# Patient Record
Sex: FEMALE | Race: AMERICAN INDIAN OR ALASKA NATIVE | NOT HISPANIC OR LATINO | Employment: UNEMPLOYED | URBAN - METROPOLITAN AREA
[De-identification: names, ages, dates, MRNs, and addresses within clinical notes are randomized per-mention and may not be internally consistent; named-entity substitution may affect disease eponyms.]

---

## 2017-02-02 ENCOUNTER — TRANSFERRED RECORDS (OUTPATIENT)
Dept: HEALTH INFORMATION MANAGEMENT | Facility: CLINIC | Age: 57
End: 2017-02-02

## 2017-07-29 ENCOUNTER — HEALTH MAINTENANCE LETTER (OUTPATIENT)
Age: 57
End: 2017-07-29

## 2019-08-27 ENCOUNTER — TRANSFERRED RECORDS (OUTPATIENT)
Dept: HEALTH INFORMATION MANAGEMENT | Facility: CLINIC | Age: 59
End: 2019-08-27

## 2019-08-27 LAB — HBA1C MFR BLD: 5.4 % (ref 0–6.6)

## 2019-11-04 ENCOUNTER — HEALTH MAINTENANCE LETTER (OUTPATIENT)
Age: 59
End: 2019-11-04

## 2020-02-16 ENCOUNTER — HEALTH MAINTENANCE LETTER (OUTPATIENT)
Age: 60
End: 2020-02-16

## 2020-11-22 ENCOUNTER — HEALTH MAINTENANCE LETTER (OUTPATIENT)
Age: 60
End: 2020-11-22

## 2020-12-07 LAB — INR (EXTERNAL): 1 (ref 0.9–1.1)

## 2020-12-08 LAB
HPV ABSTRACT: ABNORMAL
PAP-ABSTRACT: NORMAL

## 2020-12-10 LAB
CHOLESTEROL (EXTERNAL): 232 MG/DL
HDLC SERPL-MCNC: 77 MG/DL
HEP C HIM: NORMAL
HIV 1&2 EXT: NORMAL
LDL CHOLESTEROL CALCULATED (EXTERNAL): 138 MG/DL
NON HDL CHOLESTEROL (EXTERNAL): 155 MG/DL
TRIGLYCERIDES (EXTERNAL): 83 MG/DL

## 2021-04-04 ENCOUNTER — HEALTH MAINTENANCE LETTER (OUTPATIENT)
Age: 61
End: 2021-04-04

## 2021-06-03 ENCOUNTER — RECORDS - HEALTHEAST (OUTPATIENT)
Dept: ADMINISTRATIVE | Facility: CLINIC | Age: 61
End: 2021-06-03

## 2021-06-24 LAB — TSH SERPL-ACNC: 2.5 MIU/L (ref 0.3–4.2)

## 2021-09-18 ENCOUNTER — HEALTH MAINTENANCE LETTER (OUTPATIENT)
Age: 61
End: 2021-09-18

## 2021-11-13 ENCOUNTER — HEALTH MAINTENANCE LETTER (OUTPATIENT)
Age: 61
End: 2021-11-13

## 2022-04-30 ENCOUNTER — HEALTH MAINTENANCE LETTER (OUTPATIENT)
Age: 62
End: 2022-04-30

## 2022-10-12 LAB
ALT SERPL-CCNC: 12 IU/L (ref 8–45)
AST SERPL-CCNC: 15 IU/L (ref 2–40)
CREATININE (EXTERNAL): 1.19 MG/DL (ref 0.57–1.11)
GFR ESTIMATED (EXTERNAL): 52 ML/MIN/1.73M2
GLUCOSE (EXTERNAL): 101 MG/DL (ref 65–100)
POTASSIUM (EXTERNAL): 3.9 MMOL/L (ref 3.5–5)

## 2022-11-20 ENCOUNTER — HEALTH MAINTENANCE LETTER (OUTPATIENT)
Age: 62
End: 2022-11-20

## 2023-01-21 ENCOUNTER — APPOINTMENT (OUTPATIENT)
Dept: GENERAL RADIOLOGY | Facility: CLINIC | Age: 63
End: 2023-01-21
Attending: EMERGENCY MEDICINE
Payer: MEDICARE

## 2023-01-21 ENCOUNTER — HOSPITAL ENCOUNTER (EMERGENCY)
Facility: CLINIC | Age: 63
Discharge: HOME | End: 2023-01-21
Attending: EMERGENCY MEDICINE | Admitting: EMERGENCY MEDICINE
Payer: MEDICARE

## 2023-01-21 VITALS
WEIGHT: 115 LBS | HEIGHT: 63 IN | DIASTOLIC BLOOD PRESSURE: 78 MMHG | TEMPERATURE: 98 F | RESPIRATION RATE: 20 BRPM | OXYGEN SATURATION: 98 % | SYSTOLIC BLOOD PRESSURE: 135 MMHG | HEART RATE: 87 BPM | BODY MASS INDEX: 20.38 KG/M2

## 2023-01-21 DIAGNOSIS — M25.531 RIGHT WRIST PAIN: ICD-10-CM

## 2023-01-21 PROCEDURE — 73110 X-RAY EXAM OF WRIST: CPT | Mod: RT

## 2023-01-21 PROCEDURE — 99283 EMERGENCY DEPT VISIT LOW MDM: CPT

## 2023-01-21 ASSESSMENT — ACTIVITIES OF DAILY LIVING (ADL): ADLS_ACUITY_SCORE: 35

## 2023-01-21 NOTE — ED NOTES
Splint applied to her right wrist, educated on how to put it on and take it off. Advised pt to take Ibuprofen and tylenol for pain

## 2023-01-21 NOTE — ED TRIAGE NOTES
Pt presents with R wrist pain- pt reports she fractured the wrist about a year ago and had surgical intervention and now reports she has fallen 3x and having increased pain and difficulty using the wrist/hand      Triage Assessment     Row Name 01/21/23 1219       Triage Assessment (Adult)    Airway WDL WDL       Respiratory WDL    Respiratory WDL WDL       Skin Circulation/Temperature WDL    Skin Circulation/Temperature WDL WDL       Cardiac WDL    Cardiac WDL WDL       Peripheral/Neurovascular WDL    Peripheral Neurovascular WDL WDL       Cognitive/Neuro/Behavioral WDL    Cognitive/Neuro/Behavioral WDL WDL

## 2023-01-21 NOTE — ED PROVIDER NOTES
"  History     Chief Complaint:  Wrist Pain       HPI   Louann Lofton is a 62 year old female with a history of Ischemic cardiomyopathy, hypertension, and hyperlipidemia who presents with right wrist pain. The patient reports that she fractured her wrist about a year ago and had surgical intervention. She states that she has fallen 3x and has been having increased pain and difficulty rotating her wrist. She states for the pain to be \"shooting\" down her forearm when she tries to use her wrist.     Independent Historian: Patient     Review of External Notes: na     ROS:  Review of Systems    Allergies:  Neurontin   Aleve   Nsaids  Paxil   Ultram      Medications:  Elavil  Aspirin 325  Bentyl  Xopenex HFA  Toprol XL  Nitrostat  Omeprazole  Phenergan  Diovan      Past Medical History:    Acute myocardial infarction  Alcohol abuse  Asthma  Blood transfusion  Congenital coronary artery anomaly  CAD  Diaphragmatic hernia  Dyslipidemia  Eczema herpeticum  Esophageal reflux  Irritable bowel syndrome  Ischemic cardiomyopathy  LV thrombus  Depression  Mitral valve disorder  Blastocystis hominis  Hypercholesterolemia  Tobacco abuse  Ventral hernia    Past Surgical History:    HC Esophagoscopy  HC Removal gallbladder  Meniscectomy  Cholecystectomy  Hiatal hernia  (IA) SD Repair achilles tendon  Nissen surgery      Family History:    Father: Atrial fibrillation, Hypertension  Mother: COPD, Breast cancer, Ovarian cancer, Hypertension, Peripheral vascular disease  Daughter(s): Gastrointestinal disease    Social History:  Patient arrived alone via private vehicle to the ED.     Physical Exam     Patient Vitals for the past 24 hrs:   BP Temp Temp src Pulse Resp SpO2 Height Weight   01/21/23 1220 135/78 -- -- -- -- -- -- --   01/21/23 1219 -- 98  F (36.7  C) Temporal 87 20 98 % 1.6 m (5' 3\") 52.2 kg (115 lb)        Physical Exam  GENERAL: well developed, pleasant  HEAD: atraumatic  EYES: pupils reactive, extraocular muscles intact, " conjunctivae normal  ENT:  mucus membranes moist  NECK:  trachea midline, normal range of motion  RESPIRATORY: no tachypnea, breath sounds clear to auscultation   CVS: normal S1/S2, no murmurs, intact distal pulses  ABDOMEN: soft, nontender, nondistention  MUSCULOSKELETAL: no deformities. Generalized pain to the right wrist. No swelling or bruising. No erythema   SKIN: warm and dry, no acute rashes or ulceration  NEURO: GCS 15, cranial nerves intact, alert and oriented x3  PSYCH:  Mood/affect normal    Emergency Department Course     Imaging:  XR Wrist Right G/E 3 Views   Final Result   IMPRESSION: Postop ORIF distal right radius fracture with a volar plate and screw fixation hardware. Chronic healed distal right radius fracture deformity. Hardware intact. Chronic healed deformity distal ulna. No acute right wrist fracture or    dislocation. Mild thumb CMC and STT joint osteoarthritis. Chronic tiny bone fragment radial to the radial styloid. No significant right wrist soft tissue swelling.            Report per radiology    Emergency Department Course & Assessments:     Independent Interpretation (X-rays, CTs, rhythm strip):  Review of xray     Consultations/Discussion of Management or Tests:  ED Course as of 01/21/23 1457   Sat Jan 21, 2023   1235 I obtained history and examined the patient as noted above.     1325 I rechecked the patient and explained findings.         Social Determinants of Health affecting care:  na     Disposition:  The patient was discharged to home.     Impression & Plan    Medical Decision Making:  Patient presents with pain to prior surgical wrist with a few falls.  She has pain at radial aspect all the way to the proximal forearm.  The arm and wrist are normal on visual exam without erythema, warmth and palpation no obvious abnormalities.  Her motor function is normal.  Xray shows prior hardware without acute findings.  She was provided a velcro brace and instructions to follow with her  orthopedic surgeon.  Do not suspect occult fracture, septic joint, gout, pseudogout, arterial or venous occlusion.  Likely arthritis from prior surgery and recent falls and may need PT, splinting, etc.      Diagnosis:    ICD-10-CM    1. Right wrist pain  M25.531              Scribe Disclosure:  I, ATIYA ERICKSON, am serving as a scribe at 12:35 PM on 1/21/2023 to document services personally performed by Jose Ward MD based on my observations and the provider's statements to me.    1/21/2023   Jose Ward MD Adams, Shaun L, MD  01/23/23 3743

## 2023-02-18 ENCOUNTER — TRANSFERRED RECORDS (OUTPATIENT)
Dept: HEALTH INFORMATION MANAGEMENT | Facility: CLINIC | Age: 63
End: 2023-02-18
Payer: MEDICARE

## 2023-03-03 ENCOUNTER — OFFICE VISIT (OUTPATIENT)
Dept: CARDIOLOGY | Facility: CLINIC | Age: 63
End: 2023-03-03
Payer: MEDICARE

## 2023-03-03 VITALS
OXYGEN SATURATION: 97 % | SYSTOLIC BLOOD PRESSURE: 131 MMHG | HEIGHT: 63 IN | DIASTOLIC BLOOD PRESSURE: 77 MMHG | WEIGHT: 118 LBS | HEART RATE: 75 BPM | BODY MASS INDEX: 20.91 KG/M2

## 2023-03-03 DIAGNOSIS — I25.5 ISCHEMIC CARDIOMYOPATHY: ICD-10-CM

## 2023-03-03 DIAGNOSIS — I73.9 CLAUDICATION OF CALF MUSCLES (H): ICD-10-CM

## 2023-03-03 DIAGNOSIS — M79.662 PAIN IN BOTH LOWER LEGS: ICD-10-CM

## 2023-03-03 DIAGNOSIS — R00.2 PALPITATIONS: Primary | ICD-10-CM

## 2023-03-03 DIAGNOSIS — M79.661 PAIN IN BOTH LOWER LEGS: ICD-10-CM

## 2023-03-03 PROCEDURE — 93000 ELECTROCARDIOGRAM COMPLETE: CPT | Performed by: INTERNAL MEDICINE

## 2023-03-03 PROCEDURE — 99204 OFFICE O/P NEW MOD 45 MIN: CPT | Performed by: INTERNAL MEDICINE

## 2023-03-03 RX ORDER — METOPROLOL SUCCINATE 25 MG/1
12.5 TABLET, EXTENDED RELEASE ORAL DAILY
Qty: 15 TABLET | Refills: 11 | Status: SHIPPED | OUTPATIENT
Start: 2023-03-03

## 2023-03-03 RX ORDER — ASPIRIN 81 MG/1
81 TABLET, CHEWABLE ORAL DAILY
Qty: 30 TABLET | Refills: 11 | Status: SHIPPED | OUTPATIENT
Start: 2023-03-03 | End: 2024-01-03

## 2023-03-03 NOTE — LETTER
"3/3/2023    [facility], [place]  [address] [facility] [address]    RE: Louann Lofton       Dear Colleague,     I had the pleasure of seeing Louann Lofton in the [facility].  CARDIOLOGY CONSULT    REASON FOR CONSULT: cardiomyopathy    PRIMARY CARE PHYSICIAN:  [facility]    HISTORY OF PRESENT ILLNESS:  Ms. Mccabe a pleasant 62-year-old female with PMH significant for ischemic cardiomyopathy who presents to cardiology clinic to establish cardiology care.  In brief review, she originally presented in 2006 with chest pain and ST elevation on ECG. Her initial troponin on presentation was normal. Given her concerning clinical presentation, she was nonetheless administered thrombolytics (TNK) at [facility] in [place]. She continued to have chest discomfort and ECG changes and was taken to the cath lab. Initially, there was suspicion of stress-induced cardiomyopathy given the absence of culprit lesion and apical akinesis. Cardiac MRI, however, later confirmed large anterior myocardial infarction and left ventricular thrombus. She was started on warfarin and completed 3 months of therapy. Additionally, she has undergone a Nissen fundoplication in 2009 for reflux disease. She has a history of chronic abdominal pain ever since. She is undergoing an evaluation in GI clinic and also has tried medical marijuana in the past, although this has proved to be cost prohibitive. The patient believes she had \"recurrent heart attack\" in 2012. This was treated at [facility]. She does not recall being taken to the cath lab or receiving stent or other aggressive therapy.     In follow up today, Diane is primarily focused on concerns with migraine headaches.  She is a difficult historian.   She is also concerned about pain in her legs that occurs all of the time, but seems to be worse with activity.   She is not entirely clear on the medications she is taking.        PAST MEDICAL HISTORY:  Past Medical " History:   Diagnosis Date     Abdominal pain, left upper quadrant 10/21/2010     Acute myocardial infarction 4/23-4/26/11    Hospitalized @ [facility]     Alcohol abuse, unspecified 1997    Outpatient treatment at [facility]     Asthma, mild intermittent 10/5/2009     Blood transfusion, without reported diagnosis      Congenital coronary artery anomaly 2/06    Acute coronary syndrome-[place]     Coronary artery disease      Diaphragmatic hernia without mention of obstruction or gangrene 5/2/06     Dyslipidemia      Eczema herpeticum      Esophageal reflux      Folds in Descemet's membrane 10/9/2008     Hyperlipidemia      HYPERLIPIDEMIA NEC/NOS 9/25/2006     Irritable bowel syndrome      Ischemic cardiomyopathy     [place] 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, ejection fraction 48%     LV thrombus 12/1/2006     Major depressive disorder, single episode, severe, without mention of psychotic behavior 2006    major depressive disorder     MI NOS EPISODE CARE UNSPEC 3/6/2006     Mitral valve disorders(424.0) 2/25/06    Hosp - apical ballooning syndrome and acute coronary syndrome     Palpitations      PARASITIC DISEASE NEC, 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     Pure hypercholesterolemia      RECURR DEPR PSYCHOS-UNSP 3/20/2006     SEQUELAE BHAVIN INFRCT,OTHR NEC 11/29/2006     Tobacco abuse      Tobacco use disorder        MEDICATIONS:  Current Outpatient Medications   Medication     amitriptyline (ELAVIL) 10 MG tablet     aspirin 325 MG EC tablet     dicyclomine (BENTYL) 10 MG capsule     levalbuterol (XOPENEX HFA) 45 MCG/ACT inhaler     metoprolol (TOPROL XL) 50 MG 24 hr tablet     nitroglycerin (NITROSTAT) 0.4 MG SL tablet     omeprazole 20 MG tablet     promethazine (PHENERGAN) 25 MG tablet     valsartan (DIOVAN) 160 MG tablet     No current facility-administered medications for this visit.       ALLERGIES:  Allergies   Allergen Reactions     Neurontin [Gabapentin Enacarbil] Other (See Comments)     Pt  was admitted for weakness at [facility] after starting Neurontin.     Aleve [Naproxen Sodium] Hives and Swelling     lips swelled     Nsaids      Noted in 5/18/08 ER     Paxil [Paroxetine]      rash     Ultram [Tramadol Hcl] Swelling       SOCIAL HISTORY:  She continues to smoke.  No current ETOH use.      FAMILY HISTORY:  I have reviewed this patient's family history and updated it with pertinent information if needed.   Family History   Problem Relation Age of Onset     Cancer Mother         ovarian     C.A.D. Mother      Breast Cancer Mother      Respiratory Mother         COPD     Gastrointestinal Disease Daughter         Bleeding ulcers     Gastrointestinal Disease Daughter         Colitis       REVIEW OF SYSTEMS:  A complete ROS was obtained and the pertinent positives are outlined in the history of present illness above.  The remainder of systems is negative.      PHYSICAL EXAM:                     Vital Signs with Ranges     0 lbs 0 oz    Constitutional: awake, alert, no distress  Eyes: PERRL, sclera nonicteric  ENT: trachea midline  Respiratory: CTAB  Cardiovascular: RRR no m/r/g, no JVD  GI: nondistended, nontender, bowel sounds present  Lymph/Hematologic: no lymphadenopathy  Skin: dry, no rash  Musculoskeletal: good muscle tone, no edema bilaterally, +2 DP pulses bilaterally  Neurologic: no focal deficits  Neuropsychiatric: appropriate affact    DATA:    EKG: Dated 3/3/23 reviewed personally.  Sinus rhythm with Q waves in anterior leads; no changes compared with prior ECG    Coronary angiogram in 2007  CONCLUSION:   1.  Smooth 30-40% ostial LAD narrowing with a 30-50% mid-LAD   narrowing.   2. 25-30% mid RCA stenosis.   3. Decreased left ventricular function, estimated ejection fraction   of 50% with severe hypokinesia affecting the distal anterior wall   with the apex being frankly akinetic to dyskinetic.  No thrombus is   seen. No mitral regurgitation is seen.     Echocardiogram dated 1/28/21  from outside hospital  1. Moderately enlarged left ventricular chamber size.   2. Calculated 2-D biplane volumetric left ventricular ejection fraction 42 %.   3. Regional wall motion abnormalities were present (see wall motion graphics).   4. Normal right ventricular function.   5. Estimated right ventricular systolic pressure 38 mmHg.   6. No significant valvular heart disease.   7. Normal inferior vena cava size with normal inspiratory collapse (>50%).   8. Compared to the report of 07/06/2010 the following changes have occurred: LV chamber size   has increased and wall motion abnormalities are more extensive (particularly at the mid   ventricular level). Side by side comparison of images performed.            ASSESSMENT:  1.  Ischemic cardiomyopathy:   Large anterior MI 2/2006.  Class I symotoms and euvolemic at the current weight.  LVEF 40-45% by last echocardiogram  2.  Coronary artery disease:  Later anterior MI in 2006.   No symptoms concerning for recurrent obstructive CAD  3.  Tobacco abuse  4.  Hyperlipidemia  5.  Headaches  6.  Bilateral leg pain;  Atypical for claudication    RECOMMENDATIONS:  1. Challening visit and difficult to redirect towards cardiac issues.  Discussed importance of medical treatment of her CAD and ischemic cardiomyopathy.  Discussed treatment with valsartan (which she believes she is taking) along with BB therapy.  She is agreeable to metoprolol XL 50 mg daily.    2.  Continue ASA.  She reports issues with statins.  3  Will obtain bilateral ABIs with exercise to exclude peripheral arterial disease  4. Will update echocardiogram to reassess LV function  5.  Discussed importance of smoking cessation  6.  Follow up in 6 months or before than as necessary     Rose Fierro MD [facility]  March 3, 2023      Thank you for allowing me to participate in the care of your patient.      Sincerely,     Rose Fierro MD     [facility]  [facility]  cc:   No referring provider defined for this encounter.

## 2023-03-03 NOTE — PATIENT INSTRUCTIONS
-Start aspirin 81 mg daily  -Start metoprolol XL 12.5 mg daily  -Schedule echocardiogram  -Schedule bilateral LOUISE for leg symptoms   -Follow up in 6 months with Dr. Fierro

## 2023-03-03 NOTE — PROGRESS NOTES
"CARDIOLOGY CONSULT    REASON FOR CONSULT: cardiomyopathy    PRIMARY CARE PHYSICIAN:  [facility]    HISTORY OF PRESENT ILLNESS:  Ms. Mccabe a pleasant 62-year-old female with PMH significant for ischemic cardiomyopathy who presents to cardiology clinic to establish cardiology care.  In brief review, she originally presented in 2006 with chest pain and ST elevation on ECG. Her initial troponin on presentation was normal. Given her concerning clinical presentation, she was nonetheless administered thrombolytics (TNK) at [facility] in [place]. She continued to have chest discomfort and ECG changes and was taken to the cath lab. Initially, there was suspicion of stress-induced cardiomyopathy given the absence of culprit lesion and apical akinesis. Cardiac MRI, however, later confirmed large anterior myocardial infarction and left ventricular thrombus. She was started on warfarin and completed 3 months of therapy. Additionally, she has undergone a Nissen fundoplication in 2009 for reflux disease. She has a history of chronic abdominal pain ever since. She is undergoing an evaluation in GI clinic and also has tried medical marijuana in the past, although this has proved to be cost prohibitive. The patient believes she had \"recurrent heart attack\" in 2012. This was treated at [facility]. She does not recall being taken to the cath lab or receiving stent or other aggressive therapy.     In follow up today, Diane is primarily focused on concerns with migraine headaches.  She is a difficult historian.   She is also concerned about pain in her legs that occurs all of the time, but seems to be worse with activity.   She is not entirely clear on the medications she is taking.        PAST MEDICAL HISTORY:  Past Medical History:   Diagnosis Date     Abdominal pain, left upper quadrant 10/21/2010     Acute myocardial infarction 4/23-4/26/11    Hospitalized @ [facility]     Alcohol abuse, unspecified 1997    " Outpatient treatment at [facility]     Asthma, mild intermittent 10/5/2009     Blood transfusion, without reported diagnosis      Congenital coronary artery anomaly 2/06    Acute coronary syndrome-[place]     Coronary artery disease      Diaphragmatic hernia without mention of obstruction or gangrene 5/2/06     Dyslipidemia      Eczema herpeticum      Esophageal reflux      Folds in Descemet's membrane 10/9/2008     Hyperlipidemia      HYPERLIPIDEMIA NEC/NOS 9/25/2006     Irritable bowel syndrome      Ischemic cardiomyopathy     [place] 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, ejection fraction 48%     LV thrombus 12/1/2006     Major depressive disorder, single episode, severe, without mention of psychotic behavior 2006    major depressive disorder     MI NOS EPISODE CARE UNSPEC 3/6/2006     Mitral valve disorders(424.0) 2/25/06    Hosp - apical ballooning syndrome and acute coronary syndrome     Palpitations      PARASITIC DISEASE NEC, 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     Pure hypercholesterolemia      RECURR DEPR PSYCHOS-UNSP 3/20/2006     SEQUELAE BHAVIN INFRCT,OTHR NEC 11/29/2006     Tobacco abuse      Tobacco use disorder        MEDICATIONS:  Current Outpatient Medications   Medication     amitriptyline (ELAVIL) 10 MG tablet     aspirin 325 MG EC tablet     dicyclomine (BENTYL) 10 MG capsule     levalbuterol (XOPENEX HFA) 45 MCG/ACT inhaler     metoprolol (TOPROL XL) 50 MG 24 hr tablet     nitroglycerin (NITROSTAT) 0.4 MG SL tablet     omeprazole 20 MG tablet     promethazine (PHENERGAN) 25 MG tablet     valsartan (DIOVAN) 160 MG tablet     No current facility-administered medications for this visit.       ALLERGIES:  Allergies   Allergen Reactions     Neurontin [Gabapentin Enacarbil] Other (See Comments)     Pt was admitted for weakness at [facility] after starting Neurontin.     Aleve [Naproxen Sodium] Hives and Swelling     lips swelled     Nsaids      Noted in 5/18/08 ER     Paxil [Paroxetine]       rash     Ultram [Tramadol Hcl] Swelling       SOCIAL HISTORY:  She continues to smoke.  No current ETOH use.      FAMILY HISTORY:  I have reviewed this patient's family history and updated it with pertinent information if needed.   Family History   Problem Relation Age of Onset     Cancer Mother         ovarian     C.A.D. Mother      Breast Cancer Mother      Respiratory Mother         COPD     Gastrointestinal Disease Daughter         Bleeding ulcers     Gastrointestinal Disease Daughter         Colitis       REVIEW OF SYSTEMS:  A complete ROS was obtained and the pertinent positives are outlined in the history of present illness above.  The remainder of systems is negative.      PHYSICAL EXAM:                     Vital Signs with Ranges     0 lbs 0 oz    Constitutional: awake, alert, no distress  Eyes: PERRL, sclera nonicteric  ENT: trachea midline  Respiratory: CTAB  Cardiovascular: RRR no m/r/g, no JVD  GI: nondistended, nontender, bowel sounds present  Lymph/Hematologic: no lymphadenopathy  Skin: dry, no rash  Musculoskeletal: good muscle tone, no edema bilaterally, +2 DP pulses bilaterally  Neurologic: no focal deficits  Neuropsychiatric: appropriate affact    DATA:    EKG: Dated 3/3/23 reviewed personally.  Sinus rhythm with Q waves in anterior leads; no changes compared with prior ECG    Coronary angiogram in 2007  CONCLUSION:   1.  Smooth 30-40% ostial LAD narrowing with a 30-50% mid-LAD   narrowing.   2. 25-30% mid RCA stenosis.   3. Decreased left ventricular function, estimated ejection fraction   of 50% with severe hypokinesia affecting the distal anterior wall   with the apex being frankly akinetic to dyskinetic.  No thrombus is   seen. No mitral regurgitation is seen.     Echocardiogram dated 1/28/21 from outside hospital  1. Moderately enlarged left ventricular chamber size.   2. Calculated 2-D biplane volumetric left ventricular ejection fraction 42 %.   3. Regional wall motion abnormalities  were present (see wall motion graphics).   4. Normal right ventricular function.   5. Estimated right ventricular systolic pressure 38 mmHg.   6. No significant valvular heart disease.   7. Normal inferior vena cava size with normal inspiratory collapse (>50%).   8. Compared to the report of 07/06/2010 the following changes have occurred: LV chamber size   has increased and wall motion abnormalities are more extensive (particularly at the mid   ventricular level). Side by side comparison of images performed.            ASSESSMENT:  1.  Ischemic cardiomyopathy:   Large anterior MI 2/2006.  Class I symotoms and euvolemic at the current weight.  LVEF 40-45% by last echocardiogram  2.  Coronary artery disease:  Later anterior MI in 2006.   No symptoms concerning for recurrent obstructive CAD  3.  Tobacco abuse  4.  Hyperlipidemia  5.  Headaches  6.  Bilateral leg pain;  Atypical for claudication    RECOMMENDATIONS:  1. Challening visit and difficult to redirect towards cardiac issues.  Discussed importance of medical treatment of her CAD and ischemic cardiomyopathy.  Discussed treatment with valsartan (which she believes she is taking) along with BB therapy.  She is agreeable to metoprolol XL 50 mg daily.    2.  Continue ASA.  She reports issues with statins.  3  Will obtain bilateral ABIs with exercise to exclude peripheral arterial disease  4. Will update echocardiogram to reassess LV function  5.  Discussed importance of smoking cessation  6.  Follow up in 6 months or before than as necessary     Rose Fierro MD [facility]  March 3, 2023

## 2023-04-17 ENCOUNTER — HOSPITAL ENCOUNTER (EMERGENCY)
Facility: CLINIC | Age: 63
Discharge: HOME | End: 2023-04-17
Payer: MEDICARE

## 2023-04-19 ENCOUNTER — APPOINTMENT (OUTPATIENT)
Dept: CT IMAGING | Facility: CLINIC | Age: 63
End: 2023-04-19
Payer: MEDICARE

## 2023-04-19 ENCOUNTER — HOSPITAL ENCOUNTER (EMERGENCY)
Facility: CLINIC | Age: 63
Discharge: HOME | End: 2023-04-19
Attending: EMERGENCY MEDICINE | Admitting: EMERGENCY MEDICINE
Payer: MEDICARE

## 2023-04-19 ENCOUNTER — APPOINTMENT (OUTPATIENT)
Dept: CARDIOLOGY | Facility: CLINIC | Age: 63
End: 2023-04-19
Payer: MEDICARE

## 2023-04-19 VITALS
SYSTOLIC BLOOD PRESSURE: 115 MMHG | OXYGEN SATURATION: 98 % | HEART RATE: 104 BPM | DIASTOLIC BLOOD PRESSURE: 96 MMHG | TEMPERATURE: 97.4 F

## 2023-04-19 DIAGNOSIS — N30.00 ACUTE CYSTITIS WITHOUT HEMATURIA: ICD-10-CM

## 2023-04-19 DIAGNOSIS — R11.2 NAUSEA AND VOMITING, UNSPECIFIED VOMITING TYPE: ICD-10-CM

## 2023-04-19 LAB
ALBUMIN SERPL BCG-MCNC: 4.1 G/DL (ref 3.5–5.2)
ALBUMIN SERPL BCG-MCNC: 4.9 G/DL (ref 3.5–5.2)
ALBUMIN UR-MCNC: 20 MG/DL
ALP SERPL-CCNC: 60 U/L (ref 35–104)
ALP SERPL-CCNC: 75 U/L (ref 35–104)
ALT SERPL W P-5'-P-CCNC: 16 U/L (ref 10–35)
ALT SERPL W P-5'-P-CCNC: 17 U/L (ref 10–35)
AMMONIA PLAS-SCNC: 22 UMOL/L (ref 11–51)
AMPHETAMINES UR QL SCN: ABNORMAL
ANION GAP SERPL CALCULATED.3IONS-SCNC: 16 MMOL/L (ref 7–15)
ANION GAP SERPL CALCULATED.3IONS-SCNC: 19 MMOL/L (ref 7–15)
APPEARANCE UR: CLEAR
AST SERPL W P-5'-P-CCNC: 28 U/L (ref 10–35)
AST SERPL W P-5'-P-CCNC: 36 U/L (ref 10–35)
ATRIAL RATE - MUSE: 101 BPM
BARBITURATES UR QL SCN: ABNORMAL
BENZODIAZ UR QL SCN: ABNORMAL
BI-PLANE LVEF ECHO: NORMAL
BILIRUB SERPL-MCNC: 0.5 MG/DL
BILIRUB SERPL-MCNC: 0.5 MG/DL
BILIRUB UR QL STRIP: NEGATIVE
BUN SERPL-MCNC: 20.7 MG/DL (ref 8–23)
BUN SERPL-MCNC: 22.3 MG/DL (ref 8–23)
BZE UR QL SCN: ABNORMAL
CALCIUM SERPL-MCNC: 8.4 MG/DL (ref 8.8–10.2)
CALCIUM SERPL-MCNC: 9.5 MG/DL (ref 8.8–10.2)
CANNABINOIDS UR QL SCN: ABNORMAL
CHLORIDE SERPL-SCNC: 104 MMOL/L (ref 98–107)
CHLORIDE SERPL-SCNC: 107 MMOL/L (ref 98–107)
COLOR UR AUTO: ABNORMAL
CREAT SERPL-MCNC: 0.69 MG/DL (ref 0.51–0.95)
CREAT SERPL-MCNC: 0.73 MG/DL (ref 0.51–0.95)
DEPRECATED HCO3 PLAS-SCNC: 17 MMOL/L (ref 22–29)
DEPRECATED HCO3 PLAS-SCNC: 19 MMOL/L (ref 22–29)
DIASTOLIC BLOOD PRESSURE - MUSE: NORMAL MMHG
ERYTHROCYTE [DISTWIDTH] IN BLOOD BY AUTOMATED COUNT: 14.1 % (ref 10–15)
ETHANOL SERPL-MCNC: <0.01 G/DL
FLUAV RNA SPEC QL NAA+PROBE: NEGATIVE
FLUBV RNA RESP QL NAA+PROBE: NEGATIVE
GFR SERPL CREATININE-BSD FRML MDRD: >90 ML/MIN/1.73M2
GFR SERPL CREATININE-BSD FRML MDRD: >90 ML/MIN/1.73M2
GLUCOSE BLDC GLUCOMTR-MCNC: 90 MG/DL (ref 70–99)
GLUCOSE SERPL-MCNC: 117 MG/DL (ref 70–99)
GLUCOSE SERPL-MCNC: 91 MG/DL (ref 70–99)
GLUCOSE UR STRIP-MCNC: NEGATIVE MG/DL
HCT VFR BLD AUTO: 48.5 % (ref 35–47)
HGB BLD-MCNC: 16.1 G/DL (ref 11.7–15.7)
HGB UR QL STRIP: ABNORMAL
HOLD SPECIMEN: NORMAL
HOLD SPECIMEN: NORMAL
INTERPRETATION ECG - MUSE: NORMAL
KETONES UR STRIP-MCNC: 60 MG/DL
LEUKOCYTE ESTERASE UR QL STRIP: NEGATIVE
LIPASE SERPL-CCNC: 12 U/L (ref 13–60)
LVEF ECHO: NORMAL
MAGNESIUM SERPL-MCNC: 1.8 MG/DL (ref 1.7–2.3)
MCH RBC QN AUTO: 30.4 PG (ref 26.5–33)
MCHC RBC AUTO-ENTMCNC: 33.2 G/DL (ref 31.5–36.5)
MCV RBC AUTO: 92 FL (ref 78–100)
MUCOUS THREADS #/AREA URNS LPF: PRESENT /LPF
NITRATE UR QL: POSITIVE
OPIATES UR QL SCN: ABNORMAL
P AXIS - MUSE: 78 DEGREES
PH UR STRIP: 6 [PH] (ref 5–7)
PLATELET # BLD AUTO: 285 10E3/UL (ref 150–450)
POTASSIUM SERPL-SCNC: 3.5 MMOL/L (ref 3.4–5.3)
POTASSIUM SERPL-SCNC: 3.8 MMOL/L (ref 3.4–5.3)
PR INTERVAL - MUSE: 116 MS
PROT SERPL-MCNC: 6.7 G/DL (ref 6.4–8.3)
PROT SERPL-MCNC: 8.1 G/DL (ref 6.4–8.3)
QRS DURATION - MUSE: 84 MS
QT - MUSE: 410 MS
QTC - MUSE: 531 MS
R AXIS - MUSE: 47 DEGREES
RADIOLOGIST FLAGS: ABNORMAL
RBC # BLD AUTO: 5.3 10E6/UL (ref 3.8–5.2)
RBC URINE: 4 /HPF
RSV RNA SPEC NAA+PROBE: NEGATIVE
SARS-COV-2 RNA RESP QL NAA+PROBE: NEGATIVE
SODIUM SERPL-SCNC: 140 MMOL/L (ref 136–145)
SODIUM SERPL-SCNC: 142 MMOL/L (ref 136–145)
SP GR UR STRIP: 1.02 (ref 1–1.03)
SYSTOLIC BLOOD PRESSURE - MUSE: NORMAL MMHG
T AXIS - MUSE: 56 DEGREES
TROPONIN T SERPL HS-MCNC: 11 NG/L
UROBILINOGEN UR STRIP-MCNC: NORMAL MG/DL
VENTRICULAR RATE- MUSE: 101 BPM
WBC # BLD AUTO: 11.2 10E3/UL (ref 4–11)
WBC URINE: 2 /HPF

## 2023-04-19 PROCEDURE — 84484 ASSAY OF TROPONIN QUANT: CPT | Performed by: EMERGENCY MEDICINE

## 2023-04-19 PROCEDURE — 250N000011 HC RX IP 250 OP 636

## 2023-04-19 PROCEDURE — 250N000009 HC RX 250: Performed by: EMERGENCY MEDICINE

## 2023-04-19 PROCEDURE — G1010 CDSM STANSON: HCPCS | Performed by: RADIOLOGY

## 2023-04-19 PROCEDURE — 85014 HEMATOCRIT: CPT

## 2023-04-19 PROCEDURE — C9803 HOPD COVID-19 SPEC COLLECT: HCPCS | Performed by: EMERGENCY MEDICINE

## 2023-04-19 PROCEDURE — 255N000002 HC RX 255 OP 636: Performed by: INTERNAL MEDICINE

## 2023-04-19 PROCEDURE — 82140 ASSAY OF AMMONIA: CPT

## 2023-04-19 PROCEDURE — 250N000011 HC RX IP 250 OP 636: Performed by: EMERGENCY MEDICINE

## 2023-04-19 PROCEDURE — 82077 ASSAY SPEC XCP UR&BREATH IA: CPT

## 2023-04-19 PROCEDURE — 93306 TTE W/DOPPLER COMPLETE: CPT | Mod: 26 | Performed by: INTERNAL MEDICINE

## 2023-04-19 PROCEDURE — 74177 CT ABD & PELVIS W/CONTRAST: CPT | Mod: 26 | Performed by: RADIOLOGY

## 2023-04-19 PROCEDURE — 83690 ASSAY OF LIPASE: CPT

## 2023-04-19 PROCEDURE — 82962 GLUCOSE BLOOD TEST: CPT

## 2023-04-19 PROCEDURE — 93010 ELECTROCARDIOGRAM REPORT: CPT | Performed by: EMERGENCY MEDICINE

## 2023-04-19 PROCEDURE — 83735 ASSAY OF MAGNESIUM: CPT

## 2023-04-19 PROCEDURE — 96361 HYDRATE IV INFUSION ADD-ON: CPT | Performed by: EMERGENCY MEDICINE

## 2023-04-19 PROCEDURE — 87186 SC STD MICRODIL/AGAR DIL: CPT | Performed by: EMERGENCY MEDICINE

## 2023-04-19 PROCEDURE — G1010 CDSM STANSON: HCPCS

## 2023-04-19 PROCEDURE — 80307 DRUG TEST PRSMV CHEM ANLYZR: CPT | Performed by: EMERGENCY MEDICINE

## 2023-04-19 PROCEDURE — 258N000003 HC RX IP 258 OP 636

## 2023-04-19 PROCEDURE — 81001 URINALYSIS AUTO W/SCOPE: CPT | Performed by: EMERGENCY MEDICINE

## 2023-04-19 PROCEDURE — 87637 SARSCOV2&INF A&B&RSV AMP PRB: CPT

## 2023-04-19 PROCEDURE — 99285 EMERGENCY DEPT VISIT HI MDM: CPT | Mod: 25 | Performed by: EMERGENCY MEDICINE

## 2023-04-19 PROCEDURE — G1010 CDSM STANSON: HCPCS | Mod: GC | Performed by: RADIOLOGY

## 2023-04-19 PROCEDURE — 93005 ELECTROCARDIOGRAM TRACING: CPT | Performed by: EMERGENCY MEDICINE

## 2023-04-19 PROCEDURE — 36415 COLL VENOUS BLD VENIPUNCTURE: CPT

## 2023-04-19 PROCEDURE — 96374 THER/PROPH/DIAG INJ IV PUSH: CPT | Mod: 59 | Performed by: EMERGENCY MEDICINE

## 2023-04-19 PROCEDURE — 80053 COMPREHEN METABOLIC PANEL: CPT

## 2023-04-19 PROCEDURE — 999N000208 ECHOCARDIOGRAM COMPLETE

## 2023-04-19 PROCEDURE — 84155 ASSAY OF PROTEIN SERUM: CPT

## 2023-04-19 PROCEDURE — 70450 CT HEAD/BRAIN W/O DYE: CPT | Mod: 26 | Performed by: RADIOLOGY

## 2023-04-19 RX ORDER — CEFPODOXIME PROXETIL 200 MG/1
200 TABLET, FILM COATED ORAL 2 TIMES DAILY
Qty: 14 TABLET | Refills: 0 | Status: SHIPPED | OUTPATIENT
Start: 2023-04-19 | End: 2023-04-19

## 2023-04-19 RX ORDER — LORAZEPAM 2 MG/ML
0.5 INJECTION INTRAMUSCULAR ONCE
Status: COMPLETED | OUTPATIENT
Start: 2023-04-19 | End: 2023-04-19

## 2023-04-19 RX ORDER — CEFPODOXIME PROXETIL 200 MG/1
200 TABLET, FILM COATED ORAL 2 TIMES DAILY
Qty: 14 TABLET | Refills: 0 | Status: ON HOLD | OUTPATIENT
Start: 2023-04-19 | End: 2024-01-11

## 2023-04-19 RX ORDER — IOPAMIDOL 755 MG/ML
70 INJECTION, SOLUTION INTRAVASCULAR ONCE
Status: COMPLETED | OUTPATIENT
Start: 2023-04-19 | End: 2023-04-19

## 2023-04-19 RX ADMIN — LORAZEPAM 0.5 MG: 2 INJECTION INTRAMUSCULAR; INTRAVENOUS at 09:54

## 2023-04-19 RX ADMIN — SODIUM CHLORIDE 1000 ML: 9 INJECTION, SOLUTION INTRAVENOUS at 09:45

## 2023-04-19 RX ADMIN — IOPAMIDOL 70 ML: 755 INJECTION, SOLUTION INTRAVENOUS at 10:30

## 2023-04-19 RX ADMIN — HUMAN ALBUMIN MICROSPHERES AND PERFLUTREN 6 ML: 10; .22 INJECTION, SOLUTION INTRAVENOUS at 15:54

## 2023-04-19 RX ADMIN — SODIUM CHLORIDE, PRESERVATIVE FREE 70 ML: 5 INJECTION INTRAVENOUS at 10:30

## 2023-04-19 ASSESSMENT — ACTIVITIES OF DAILY LIVING (ADL)
ADLS_ACUITY_SCORE: 33
ADLS_ACUITY_SCORE: 35

## 2023-04-19 NOTE — ED NOTES
Pt signed out to me by Dr. Cedillo at 3 pm.       Situation: Patient is a 62-year-old female with a history of multiple chronic medical problems who presented to the ER with nausea vomiting and diarrhea.  Patient was having active vomiting when she presented to the ER.  She ended up having a CT abdomen pelvis that showed concern for a LV thrombus.  Plan was therefore to obtain an echocardiogram and discussed the case with cardiology.  The cardiology fellow had already been consulted regarding the case.    Plan: Plan is to obtain a echocardiogram and discussed the care with cardiology.    Shift Report:    Patient had an echocardiogram done that shows no signs of an LV thrombus.  It appears it is likely trabecula.  I discussed these findings with Dr. Meir Ward as well as the cardiology fellow.  They reviewed patient's medical chart and do not feel that she has any cardiovascular reasons to be admitted.  It appears that patient's had this LV thrombus before and is no longer there.  She has no reason for needing anticoagulation.  I went and spoke to the patient regarding these results.  Patient woke up and was wondering if she be a candidate for hospice.  I however have no reason to think that she would need hospice at this time.  Patient is alert oriented though she has an odd affect.  Patient's UA does show positive nitrates so we will treat her with oral antibiotics for presumed UTI.  This is possibly what caused her nausea and vomiting before.  Plan will be to discharge her home with outpatient follow-up    Signed:  Charlotte Cedillo MD  April 19, 2023 at 7:24 PM       Charlotte Cedillo MD  04/19/23 [id number]

## 2023-04-19 NOTE — ED PROVIDER NOTES
ED Provider Note  [facility]      History     Chief Complaint   Patient presents with     Nausea, Vomiting, & Diarrhea     HPI  Louann Lofton is a 62 year old female with PMH of ischemic cardiomyopathy, migraines, GERD s/p Nissen fundoplication in 2009, asthma, alcohol abuse, tobacco use, depression/anxiety and PTSD who presents for evaluation of nausea, vomiting and diarrhea.     Patient was brought in by ambulance. She was given 8 mg zofran en route.  Patient is a difficult historian.  She reports that yesterday she was feeling unwell and having nausea.  She did drink roughly 4 shots of alcohol at least yesterday during the day with friends.  She reports that this was her first time drinking alcohol in quite some time, she does have history of alcohol abuse.  She says that this morning she woke up and was vomiting.  She has vomited several times.  She thinks that her emesis may have contained some blood, but she is unsure if she ate something that may have been red-colored yesterday.  Patient reports that this morning she also started experiencing diarrhea.  She describes the diarrhea as loose and watery.  Since this morning she has had 2 episodes of diarrhea.  No recent antibiotic use.  Patient says that she has not been eating or drinking much.    She is not having any abdominal pain.  She denies any fevers or chills.  She is not having any pain with urination.  She does endorse some shortness of breath at baseline, she does have a history of asthma and is a current smoker.  She does not endorse any active chest pain.    Patient lives with her son in an apartment.  She reports that she is tired of feeling sick and wishes that she would be able to go on hospice.      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    Acute coronary syndrome-[place]     Coronary artery disease      Diaphragmatic hernia without mention of obstruction or gangrene 5/2/06     Dyslipidemia      Eczema herpeticum      Esophageal reflux      Folds in Descemet's membrane 10/9/2008     Hyperlipidemia      HYPERLIPIDEMIA NEC/NOS 9/25/2006     Irritable bowel syndrome      Ischemic cardiomyopathy     [place] 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, ejection fraction 48%     LV thrombus 12/1/2006     Major depressive disorder, single episode, severe, without mention of psychotic behavior 2006    major depressive disorder     MI NOS EPISODE CARE UNSPEC 3/6/2006     Mitral valve disorders(424.0) 2/25/06    Hosp - apical ballooning syndrome and acute coronary syndrome     Palpitations      PARASITIC DISEASE NEC, 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     Pure hypercholesterolemia      RECURR DEPR PSYCHOS-UNSP 3/20/2006     SEQUELAE BHAVIN INFRCT,OTHR NEC 11/29/2006     Tobacco abuse      Tobacco use disorder      Past Surgical History:   Procedure Laterality Date     HC ESOPHAGOSCOPY, DIAGNOSTIC  5/2/06     HC REMOVAL GALLBLADDER       cefpodoxime (VANTIN) 200 MG tablet  amitriptyline (ELAVIL) 10 MG tablet  aspirin (ASA) 81 MG chewable tablet  aspirin 325 MG EC tablet  dicyclomine (BENTYL) 10 MG capsule  levalbuterol (XOPENEX HFA) 45 MCG/ACT inhaler  metoprolol (TOPROL XL) 50 MG 24 hr tablet  metoprolol succinate ER (TOPROL XL) 25 MG 24 hr tablet  nitroglycerin (NITROSTAT) 0.4 MG SL tablet  omeprazole 20 MG tablet  promethazine (PHENERGAN) 25 MG tablet  valsartan (DIOVAN) 160 MG tablet      Allergies   Allergen Reactions     Neurontin [Gabapentin Enacarbil] Other (See Comments)     Pt was admitted for weakness at [facility] after starting Neurontin.     Aleve  [Naproxen Sodium] Hives and Swelling     lips swelled     Nsaids      Noted in 08 ER     Paxil [Paroxetine]      rash     Ultram [Tramadol Hcl] Swelling     Family History  Family History   Problem Relation Age of Onset     Cancer Mother         ovarian     C.A.D. Mother      Breast Cancer Mother      Respiratory Mother         COPD     Gastrointestinal Disease Daughter         Bleeding ulcers     Gastrointestinal Disease Daughter         Colitis     Social History   Social History     Tobacco Use     Smoking status: Every Day     Packs/day: 0.50     Years: 20.00     Pack years: 10.00     Types: Cigarettes     Last attempt to quit: 2011     Years since quittin.0     Smokeless tobacco: Never     Tobacco comments:     On and off   Substance Use Topics     Alcohol use: Yes     Comment: denies at this time, hx of abuse, none at this time     Drug use: No      Past medical history, past surgical history, medications, allergies, family history, and social history were reviewed with the patient. No additional pertinent items.      A complete review of systems was performed with pertinent positives and negatives noted in the HPI, and all other systems negative.    Physical Exam   BP: (!) 149/79  Pulse: 84  Temp: 97.4  F (36.3  C)  SpO2: 96 %  Physical Exam  Constitutional:       General: She is in acute distress.      Appearance: She is cachectic. She is ill-appearing.      Comments: Actively vomiting in the ED.    HENT:      Head: Normocephalic and atraumatic.      Mouth/Throat:      Mouth: Mucous membranes are dry.   Eyes:      Extraocular Movements: Extraocular movements intact.      Conjunctiva/sclera: Conjunctivae normal.   Cardiovascular:      Rate and Rhythm: Normal rate and regular rhythm.   Pulmonary:      Effort: Pulmonary effort is normal.      Breath sounds: Decreased air movement present. No wheezing.   Abdominal:      General: Bowel sounds are normal. There is no distension.      Palpations:  Abdomen is soft.      Tenderness: There is no abdominal tenderness. There is no guarding.   Musculoskeletal:         General: Normal range of motion.      Cervical back: Normal range of motion and neck supple.      Right lower leg: No edema.      Left lower leg: No edema.   Skin:     General: Skin is warm and dry.   Neurological:      General: No focal deficit present.      Mental Status: She is alert and oriented to person, place, and time.   Psychiatric:         Mood and Affect: Mood is anxious.         ED Course, Procedures, & Data     ED Course as of 04/20/23 1836   Wed Apr 19, 2023   0912 Patient actively vomiting.    0922 CBC concerning for hemoconcentration. Given 1L NS.     1043 Given Ativan for nausea due to concern for prolonged Qtc.    1044 Mg wnl, lipase low and CMP wnl except for anion gap metabolic acidosis.    1332 Patient with altered mental status and quite sleepy in the room. CT head and ammonia ordered.      Results for orders placed or performed during the hospital encounter of 04/19/23   CT Abdomen Pelvis w Contrast     Status: Abnormal   Result Value Ref Range    Radiologist flags Suspected thrombus within heart apex, recommend (AA)     Narrative    EXAMINATION: CT ABDOMEN PELVIS W CONTRAST, 4/19/2023 10:38 AM    INDICATION: Pt with nausea, vomiting and diarrhea. Concern for  infection versus pancreatitis versus SBO.    COMPARISON STUDY: CT 1/10/2011    TECHNIQUE: CT scan of the abdomen and pelvis was performed on  multidetector CT scanner using volumetric acquisition technique and  images were reconstructed in multiple planes with variable thickness  and reviewed on dedicated workstations.     CONTRAST: Iopamidol 70 mL injected IV without oral contrast    CT scan radiation dose is optimized to minimum requisite dose using  automated dose modulation techniques.    FINDINGS:    Lower thorax: No pleural effusion or air way infiltration. Status post  anterior myocardial infarct with subendocardial  fat deposition along  the anteromedial left ventricular wall and with aneurysmal apical  ballooning. There is suspected partial thrombus with areas of  calcification at the left ventricular apex (4/15) .    Liver: No mass. No intrahepatic biliary ductal dilation.  Diffuse  hepatic steatosis    Biliary System: Gallbladder surgically absent with prominent  extrahepatic bile ducts, likely secondary to reservoir effect.     Pancreas: No mass or pancreatic ductal dilation.    Adrenal glands: No mass or nodules    Spleen: Normal.    Kidneys: No suspicious mass, obstructing calculus or hydronephrosis.    Gastrointestinal tract: Nissen fundoplication changes with small  hiatal hernia involving the fundal wrap. Normal appendix. Normal  caliber small bowel.  Colonic diverticulosis with no diverticulitis.   Thickened appearance of the cecum and ascending/proximal transverse  colon without pericolonic fat stranding, likely related to luminal  collapse. Liquid stool throughout the colon.    Mesentery/peritoneum/retroperitoneum: No mass. No free fluid or air.    Lymph nodes: No significant lymphadenopathy.    Vasculature: Severe atherosclerotic calcification of abdominal aorta  with no aneurysmal dilation.  Patent SMA and SMV.    Pelvis: Urinary bladder is normal. Uterus and adnexa within normal  limits.    Osseous structures: No aggressive or acute osseous lesion.      Soft tissues: Multiple fat containing ventral periumbilical and  supraumbilical abdominal wall hernias, largest fascial defect  measuring up to 3.9 cm .      Impression    IMPRESSION:  1. No acute intraabdominal pathology.  2. Liquid stool throughout the colon, compatible with history of  diarrhea.   3. Status post anterior MI with aneurysmal apical ballooning.  Suspected thrombus within the left ventricular apex.  4. Other chronic and incidental findings as detailed in the body of  the report.    [Critical Result: Suspected thrombus within heart apex,  recommend  cardiology consult.]    Finding was identified on 4/19/2023 10:59 AM.     MD Jose Antonio was contacted by Dr. Lutz at 4/19/2023 11:10 AM and  verbalized understanding of the critical finding.     I have personally reviewed the examination and initial interpretation  and I agree with the findings.    ALEXANDRE BURRIS DO         SYSTEM ID:  [id number]   Head CT w/o contrast     Status: None    Narrative    EXAM: CT HEAD W/O CONTRAST  4/19/2023 1:46 PM     HISTORY: AMS       COMPARISON: CT head 10/17/2009    TECHNIQUE: Using multidetector thin collimation helical acquisition  technique, axial, coronal and sagittal CT images from the skull base  to the vertex were obtained without intravenous contrast.   (topogram) image(s) also obtained and reviewed.    FINDINGS:  No acute intracranial hemorrhage, mass effect, or midline shift. No  acute loss of gray-white matter differentiation in the cerebral  hemispheres. Ventricles are proportionate to the cerebral sulci. Clear  basal cisterns.    The bony calvaria and the bones of the skull base are normal. The  visualized portions of the paranasal sinuses and mastoid air cells are  clear. Grossly normal orbits.       Impression    IMPRESSION: No acute intracranial pathology.     I have personally reviewed the examination and initial interpretation  and I agree with the findings.    CHASE KHAN MD         SYSTEM ID:  [id number]   CBC with platelets     Status: Abnormal   Result Value Ref Range    WBC Count 11.2 (H) 4.0 - 11.0 10e3/uL    RBC Count 5.30 (H) 3.80 - 5.20 10e6/uL    Hemoglobin 16.1 (H) 11.7 - 15.7 g/dL    Hematocrit 48.5 (H) 35.0 - 47.0 %    MCV 92 78 - 100 fL    MCH 30.4 26.5 - 33.0 pg    MCHC 33.2 31.5 - 36.5 g/dL    RDW 14.1 10.0 - 15.0 %    Platelet Count 285 150 - 450 10e3/uL   Comprehensive metabolic panel     Status: Abnormal   Result Value Ref Range    Sodium 142 136 - 145 mmol/L    Potassium 3.8 3.4 - 5.3 mmol/L    Chloride 104 98 - 107 mmol/L     Carbon Dioxide (CO2) 19 (L) 22 - 29 mmol/L    Anion Gap 19 (H) 7 - 15 mmol/L    Urea Nitrogen 22.3 8.0 - 23.0 mg/dL    Creatinine 0.69 0.51 - 0.95 mg/dL    Calcium 9.5 8.8 - 10.2 mg/dL    Glucose 117 (H) 70 - 99 mg/dL    Alkaline Phosphatase 75 35 - 104 U/L    AST 36 (H) 10 - 35 U/L    ALT 17 10 - 35 U/L    Protein Total 8.1 6.4 - 8.3 g/dL    Albumin 4.9 3.5 - 5.2 g/dL    Bilirubin Total 0.5 <=1.2 mg/dL    GFR Estimate >90 >60 mL/min/1.73m2   Asymptomatic Influenza A/B, RSV, & SARS-CoV2 PCR (COVID-19) Nasopharyngeal     Status: Normal    Specimen: Nasopharyngeal; Swab   Result Value Ref Range    Influenza A PCR Negative Negative    Influenza B PCR Negative Negative    RSV PCR Negative Negative    SARS CoV2 PCR Negative Negative    Narrative    Testing was performed using the Xpert Xpress CoV2/Flu/RSV Assay on the [employer] Instrument. This test should be ordered for the detection of SARS-CoV-2, influenza, and RSV viruses in individuals who meet clinical and/or epidemiological criteria. Test performance is unknown in asymptomatic patients. This test is for in vitro diagnostic use under the FDA EUA for laboratories certified under CLIA to perform high or moderate complexity testing. This test has not been FDA cleared or approved. A negative result does not rule out the presence of PCR inhibitors in the specimen or target RNA in concentration below the limit of detection for the assay. If only one viral target is positive but coinfection with multiple targets is suspected, the sample should be re-tested with another FDA cleared, approved, or authorized test, if coinfection would change clinical management. This test was validated by the [facility] [employer]. These laboratories are certified under the Clinical Laboratory Improvement Amendments of 1988 (CLIA-88) as qualified to perform high complexity laboratory testing.   Lipase     Status: Abnormal   Result Value Ref Range    Lipase 12 (L) 13 - 60 U/L    Magnesium     Status: Normal   Result Value Ref Range    Magnesium 1.8 1.7 - 2.3 mg/dL   [place] Draw     Status: None    Narrative    The following orders were created for panel order [place] [facility].  Procedure                               Abnormality         Status                     ---------                               -----------         ------                     Extra Blue Top Tube[[id number]]                              Final result               Extra Red Top Tube[[id number]]                               Final result                 Please view results for these tests on the individual orders.   Extra Blue Top Tube     Status: None   Result Value Ref Range    Hold Specimen JIC    Extra Red Top Tube     Status: None   Result Value Ref Range    Hold Specimen JIC    Troponin T, High Sensitivity     Status: Normal   Result Value Ref Range    Troponin T, High Sensitivity 11 <=14 ng/L   UA with Microscopic reflex to Culture     Status: Abnormal    Specimen: Urine, Midstream   Result Value Ref Range    Color Urine Light Yellow Colorless, Straw, Light Yellow, Yellow    Appearance Urine Clear Clear    Glucose Urine Negative Negative mg/dL    Bilirubin Urine Negative Negative    Ketones Urine 60 (A) Negative mg/dL    Specific Gravity Urine 1.020 1.003 - 1.035    Blood Urine Small (A) Negative    pH Urine 6.0 5.0 - 7.0    Protein Albumin Urine 20 (A) Negative mg/dL    Urobilinogen Urine Normal Normal, 2.0 mg/dL    Nitrite Urine Positive (A) Negative    Leukocyte Esterase Urine Negative Negative    Mucus Urine Present (A) None Seen /LPF    RBC Urine 4 (H) <=2 /HPF    WBC Urine 2 <=5 /HPF    Narrative    Urine Culture ordered based on laboratory criteria   Alcohol ethyl     Status: Normal   Result Value Ref Range    Alcohol ethyl <0.01 <=0.01 g/dL   Glucose by meter     Status: Normal   Result Value Ref Range    GLUCOSE BY METER POCT 90 70 - 99 mg/dL   Ammonia (on ice)     Status: Normal   Result Value Ref Range     Ammonia 22 11 - 51 umol/L   Comprehensive metabolic panel     Status: Abnormal   Result Value Ref Range    Sodium 140 136 - 145 mmol/L    Potassium 3.5 3.4 - 5.3 mmol/L    Chloride 107 98 - 107 mmol/L    Carbon Dioxide (CO2) 17 (L) 22 - 29 mmol/L    Anion Gap 16 (H) 7 - 15 mmol/L    Urea Nitrogen 20.7 8.0 - 23.0 mg/dL    Creatinine 0.73 0.51 - 0.95 mg/dL    Calcium 8.4 (L) 8.8 - 10.2 mg/dL    Glucose 91 70 - 99 mg/dL    Alkaline Phosphatase 60 35 - 104 U/L    AST 28 10 - 35 U/L    ALT 16 10 - 35 U/L    Protein Total 6.7 6.4 - 8.3 g/dL    Albumin 4.1 3.5 - 5.2 g/dL    Bilirubin Total 0.5 <=1.2 mg/dL    GFR Estimate >90 >60 mL/min/1.73m2   Drug abuse screen 1 urine (ED)     Status: Abnormal   Result Value Ref Range    Amphetamines Urine Screen Positive (A) Screen Negative    Barbituates Urine Screen Negative Screen Negative    Benzodiazepine Urine Screen Negative Screen Negative    Cannabinoids Urine Screen Positive (A) Screen Negative    Cocaine Urine Screen Negative Screen Negative    Opiates Urine Screen Negative Screen Negative   EKG 12-lead, complete     Status: None   Result Value Ref Range    Systolic Blood Pressure  mmHg    Diastolic Blood Pressure  mmHg    Ventricular Rate 101 BPM    Atrial Rate 101 BPM    HI Interval 116 ms    QRS Duration 84 ms     ms    QTc 531 ms    P Axis 78 degrees    R AXIS 47 degrees    T Axis 56 degrees    Interpretation ECG       Sinus tachycardia  Biatrial enlargement  Anteroseptal infarct , age undetermined  Prolonged QT  Abnormal ECG  Unconfirmed report - interpretation of this ECG is computer generated - see medical record for final interpretation  Confirmed by - EMERGENCY ROOM, PHYSICIAN ([id number]),  ZACH MARTI ([id number]) on 4/19/2023 10:11:11 AM     Echo Complete     Status: None   Result Value Ref Range    Biplane LVEF 41%     LVEF  40-45% (mildly reduced)     [facility]    [id number]  [id number]  [id number]  [id number]^CJ^MIRIAM^BARBIE     [facility]  [facility]  Echocardiography Laboratory  [address] [place] [id number]     Name: GUSLHAN POSADA  MRN: [id number]  : 1960  Study Date: 2023 03:12 PM  Age: 62 yrs  Gender: Female  Patient Location: [facility]  Reason For Study: Cardiac Thrombus seen on CT A/P  Ordering Physician: MIRIAM CORONA  Performed By: Krystal Callaway     BSA: 1.5 m2  Height: 63 in  Weight: 118 lb  ______________________________________________________________________________  Procedure  Complete Portable Echo Adult. Contrast Optison. Optison (NDC #8698-1035-08)  given intravenously. Patient was given 6 ml mixture of 3 ml Optison and 6 ml  saline. 3 ml wasted.  ______________________________________________________________________________  Interpretation Summary  Ischemic CM. Left ventricular function is decreased. The ejection fraction is  40-45% (mildly reduced).  Apical aneurysm without any thrombus. Reported finding on CT likely represents  a trabeculation.  Global right ventricular function is normal.  No significant valvular abnormalities present.  IVC diameter <2.1 cm collapsing >50% with sniff suggests a normal RA pressure  of 3 mmHg.  No pericardial effusion is present.  ______________________________________________________________________________  Left Ventricle  Left ventricular wall thickness is normal. Biplane LVEF is 41%. Severe left  ventricular dilation is present. Left ventricular function is decreased. The  ejection fraction is 40-45% (mildly reduced). Left ventricular diastolic  function is not assessable. Apical wall akinesis is present. There is no  thrombus seen in the left ventricle.     Right Ventricle  The right ventricle is normal size. Global right ventricular function is  normal.     Atria  The right atria appears normal. Severe left atrial enlargement is present.     Mitral Valve  Mild mitral annular calcification is present. Trace mitral insufficiency is  present.     Aortic Valve  On Doppler  interrogation, there is no significant stenosis or regurgitation.     Tricuspid Valve  The tricuspid valve is normal. The peak velocity of the tricuspid regurgitant  jet is not obtainable.     Pulmonic Valve  Trace pulmonic insufficiency is present.     Vessels  The aorta root is normal. IVC diameter <2.1 cm collapsing >50% with sniff  suggests a normal RA pressure of 3 mmHg.     Pericardium  No pericardial effusion is present.     Miscellaneous  No significant valvular abnormalities present.     Compared to Previous Study  No significant changes noted.  ______________________________________________________________________________  MMode/2D Measurements & Calculations  IVSd: 0.82 cm  LVIDd: 5.0 cm  LVIDs: 4.4 cm  LVPWd: 0.88 cm  FS: 13.1 %  LV mass(C)d: 148.3 grams  LV mass(C)dI: 96.0 grams/m2  Ao root diam: 2.8 cm  asc Aorta Diam: 2.5 cm  LVOT diam: 2.0 cm  LVOT area: 3.3 cm2  LA Volume (BP): 69.7 ml     LA Volume Index (BP): 45.0 ml/m2  RWT: 0.35     Doppler Measurements & Calculations  RV S Omid: 18.3 cm/sec     ______________________________________________________________________________  Report approved by: Karin Chang 04/19/2023 04:17 PM         Urine Culture     Status: Abnormal (Preliminary result)    Specimen: Urine, Midstream   Result Value Ref Range    Culture >100,000 CFU/mL Escherichia coli (A)    Urine Culture     Status: Abnormal (Preliminary result)    Specimen: Urine, Midstream   Result Value Ref Range    Culture >100,000 CFU/mL Escherichia coli (A)    Urine Drugs of Abuse Screen     Status: Abnormal    Narrative    The following orders were created for panel order Urine Drugs of Abuse Screen.  Procedure                               Abnormality         Status                     ---------                               -----------         ------                     Drug abuse screen 1 urin...[[id number]]  Abnormal            Final result                 Please view results for these tests  on the individual orders.     Medications   0.9% sodium chloride BOLUS (0 mLs Intravenous Stopped 4/19/23 1200)   LORazepam (ATIVAN) injection 0.5 mg (0.5 mg Intravenous $Given 4/19/23 0956)   CT saline (70 mLs Intravenous $Given 4/19/23 1030)   iopamidol (ISOVUE-370) solution 70 mL (70 mLs Intravenous $Given 4/19/23 1030)   perflutren diluted 1mL to 2mL with saline (OPTISON) diluted injection 6 mL (6 mLs Intravenous $Given 4/19/23 4954)     Labs Ordered and Resulted from Time of ED Arrival to Time of ED Departure   CBC WITH PLATELETS - Abnormal       Result Value    WBC Count 11.2 (*)     RBC Count 5.30 (*)     Hemoglobin 16.1 (*)     Hematocrit 48.5 (*)     MCV 92      MCH 30.4      MCHC 33.2      RDW 14.1      Platelet Count 285     COMPREHENSIVE METABOLIC PANEL - Abnormal    Sodium 142      Potassium 3.8      Chloride 104      Carbon Dioxide (CO2) 19 (*)     Anion Gap 19 (*)     Urea Nitrogen 22.3      Creatinine 0.69      Calcium 9.5      Glucose 117 (*)     Alkaline Phosphatase 75      AST 36 (*)     ALT 17      Protein Total 8.1      Albumin 4.9      Bilirubin Total 0.5      GFR Estimate >90     LIPASE - Abnormal    Lipase 12 (*)    ROUTINE UA WITH MICROSCOPIC REFLEX TO CULTURE - Abnormal    Color Urine Light Yellow      Appearance Urine Clear      Glucose Urine Negative      Bilirubin Urine Negative      Ketones Urine 60 (*)     Specific Gravity Urine 1.020      Blood Urine Small (*)     pH Urine 6.0      Protein Albumin Urine 20 (*)     Urobilinogen Urine Normal      Nitrite Urine Positive (*)     Leukocyte Esterase Urine Negative      Mucus Urine Present (*)     RBC Urine 4 (*)     WBC Urine 2     COMPREHENSIVE METABOLIC PANEL - Abnormal    Sodium 140      Potassium 3.5      Chloride 107      Carbon Dioxide (CO2) 17 (*)     Anion Gap 16 (*)     Urea Nitrogen 20.7      Creatinine 0.73      Calcium 8.4 (*)     Glucose 91      Alkaline Phosphatase 60      AST 28      ALT 16      Protein Total 6.7      Albumin  4.1      Bilirubin Total 0.5      GFR Estimate >90     DRUG ABUSE SCREEN 1 URINE (ED) - Abnormal    Amphetamines Urine Screen Positive (*)     Barbituates Urine Screen Negative      Benzodiazepine Urine Screen Negative      Cannabinoids Urine Screen Positive (*)     Cocaine Urine Screen Negative      Opiates Urine Screen Negative     INFLUENZA A/B, RSV, & SARS-COV2 PCR - Normal    Influenza A PCR Negative      Influenza B PCR Negative      RSV PCR Negative      SARS CoV2 PCR Negative     MAGNESIUM - Normal    Magnesium 1.8     TROPONIN T, HIGH SENSITIVITY - Normal    Troponin T, High Sensitivity 11     ETHYL ALCOHOL LEVEL - Normal    Alcohol ethyl <0.01     GLUCOSE BY METER - Normal    GLUCOSE BY METER POCT 90     AMMONIA - Normal    Ammonia 22     GLUCOSE MONITOR NURSING POCT     Echo Complete   Final Result      Head CT w/o contrast   Final Result   IMPRESSION: No acute intracranial pathology.       I have personally reviewed the examination and initial interpretation   and I agree with the findings.      CHASE KHAN MD            SYSTEM ID:  [id number]      CT Abdomen Pelvis w Contrast   Final Result   Abnormal   IMPRESSION:   1. No acute intraabdominal pathology.   2. Liquid stool throughout the colon, compatible with history of   diarrhea.    3. Status post anterior MI with aneurysmal apical ballooning.   Suspected thrombus within the left ventricular apex.   4. Other chronic and incidental findings as detailed in the body of   the report.      [Critical Result: Suspected thrombus within heart apex, recommend   cardiology consult.]      Finding was identified on 4/19/2023 10:59 AM.       MD Jose Antonio was contacted by Dr. Lutz at 4/19/2023 11:10 AM and   verbalized understanding of the critical finding.       I have personally reviewed the examination and initial interpretation   and I agree with the findings.      ALEXANDRE BURRIS DO            SYSTEM ID:  [id number]            Assessment & Plan    Louann HOLLY  Rolly is a 62 year old female with PMH of ischemic cardiomyopathy, migraines, GERD s/p Nissen fundoplication in 2009, asthma, alcohol abuse, tobacco use, depression/anxiety and PTSD who presents for evaluation of nausea, vomiting and diarrhea.     On presentation, patient is hemodynamically stable, but is actively vomiting. Patient has a history of alcohol abuse and did drink yesterday for the first time in quite some time. This definitely could be exacerbating patient's nausea, vomiting and diarrhea. Although patient doesn't report abdominal pain and has benign abdominal exam, patient is a difficult historian so there is concern for pancreatitis, SBO or other intra-abdominal process with nausea and vomiting. Also, concern for viral gastroenteritis versus C. diff at this time given diarrhea, but again could be exacerbated given alcohol use.     Patient given 8 mg zofran en route without much improvement in nausea/vomiting. EKG concerning for prolonged QTc so given ativan for nausea/vomiting with noted improvement. Given IVF. CBC appears hemoconcentrated. CMP remarkable for anion gap metabolic acidosis but no electrolyte abnormalities. Lipase is low so unlikely to be pancreatitis. CT A/P without any acute intra-abdominal pathology. Covid, RSV and influenza are negative.     However, CT A/P does show LV thrombus. Per chart review, this LV thrombus is not new. Has been treated with Warfarin for 3 months previously for this. Discussed with cardiology and they would recommend TTE for further evaluation. If LV thrombus is visualized on TTE, then cardiology would likely recommend DOAC.      On re-evaluation, patient is very sleepy and altered. She did receive 0.5 mg IV ativan earlier on, but wouldn't expect that low of a dose to cause this. Glucose is 90. Ammonia is wnl. Alcohol level is negative. UDS and CT head pending.  Did also add on urine drug screen which is pending at this time.    Head CT without acute pathology.   Alcohol level negative.    Patient was signed out to Dr. Cedillo who will follow-up on patient's echo, urine drug screen and UA.  On reassessment patient was made more awake and alert, her sleepy and altered this could be secondary to the Ativan.  Patient is awaiting echo, then formal cardiology recommendations.  Dispo pending at this time.    If patient's workup is negative, then patient can be discharged to home.      I have reviewed the nursing notes. I have reviewed the findings, diagnosis, plan and need for follow up with the patient.    Discharge Medication List as of 4/19/2023  7:31 PM          Final diagnoses:   Nausea and vomiting, unspecified vomiting type   Acute cystitis without hematuria     Shena Canales MD   Internal Medicine, PGY1  [facility] EMERGENCY DEPARTMENT  4/19/2023      --    ED Attending Physician Attestation    I Humphrey Zaidi MD, cared for this patient with the Resident. I have performed a history and physical examination of the patient and discussed management with the resident. I reviewed the resident's documentation above and agree with the documented findings and plan of care.    Summary of HPI, PE, ED Course   Patient is a 62 year old female evaluated in the emergency department for nausea vomiting diarrhea x1 day duration. Exam and ED course notable for normal laboratory studies, CT scan with ventricular thrombus but no other acute findings in the abdomen/pelvis.  Patient initially presented with acute emesis appeared mentally appropriate but then later appeared somewhat altered though this was after getting Ativan for nausea vomiting that persisted after Zofran.  Patient had a head CT that was unremarkable, alcohol level negative, ammonia normal.  After evaluation patient did appear less altered, nausea vomiting was controlled.  Discussed case with cardiology, who recommended an echo.  This is pending.  She will need echo cards consult and follow-up on UA disposition  pending at this time.    Critical Care & Procedures  Not applicable.        Medical Decision Making  The patient's presentation was of moderate complexity (an undiagnosed new problem with uncertain diagnosis).    The patient's evaluation involved:  review of external note(s) from 3+ sources (see separate area of note for details)  ordering and/or review of 3+ test(s) in this encounter (see separate area of note for details)  review of 3+ test result(s) ordered prior to this encounter (see separate area of note for details)  independent interpretation of testing performed by another health professional (see separate area of note for details)  discussion of management or test interpretation with another health professional (see separate area of note for details)    The patient's management necessitated further care after sign-out to Dr. patton (see their note for further management).          Humphrey Zaidi MD  Emergency Medicine        Dyan, Humphrey Coreas MD  04/20/23 1001

## 2023-04-19 NOTE — ED TRIAGE NOTES
Pt was brought in by [facility] ambulance for nausea, vomiting and diarrhea. She was given 8 mg zofran by ambulance. She fell asleep after and did not answer questions after that. She is here with her service dog. Pt did not want to come into the triage office to answer questions- would rather stay in the lobby

## 2023-04-19 NOTE — ED TRIAGE NOTES
"    pt says she was drinking alcohol last night with friends. She says she has vomited \"a lot\" and had diarrhea as well. She says nothing hurts right now. She requests water and she gets thirsty and then throws up. RN said we should wait a little before giving water to let the zofran work  "

## 2023-04-20 LAB — BACTERIA UR CULT: ABNORMAL

## 2023-04-20 NOTE — ED NOTES
"Pt given discharge paperwork.  All medications, discharge instructions and follow up appointments discussed.  PIV removed prior.  Pt then became upset. \"so you're not giving me any fucking pain pills, I am just suppsoed to go home and suffer?\"  Pt then took her dog, all belongings and stormed out of ER to cab.  "

## 2023-04-20 NOTE — DISCHARGE INSTRUCTIONS
You have a potential urinary tract infection that caused your symptoms.     Take the antibiotics as prescribed.     Your CT head is normal.     Your echocardiogram (ultrasound of heart) is stable.     Your CT abdomen/pelvis is normal.     Please make an appointment to follow up with Your Primary Care Provider in 3-5 days even if entirely better.

## 2023-04-21 LAB — BACTERIA UR CULT: ABNORMAL

## 2023-07-08 ENCOUNTER — HEALTH MAINTENANCE LETTER (OUTPATIENT)
Age: 63
End: 2023-07-08

## 2023-11-25 ENCOUNTER — HEALTH MAINTENANCE LETTER (OUTPATIENT)
Age: 63
End: 2023-11-25

## 2024-01-02 ENCOUNTER — APPOINTMENT (OUTPATIENT)
Dept: GENERAL RADIOLOGY | Facility: CLINIC | Age: 64
DRG: 065 | End: 2024-01-02
Attending: STUDENT IN AN ORGANIZED HEALTH CARE EDUCATION/TRAINING PROGRAM
Payer: MEDICARE

## 2024-01-02 ENCOUNTER — HOSPITAL ENCOUNTER (INPATIENT)
Facility: CLINIC | Age: 64
LOS: 9 days | Discharge: NURSING, REHAB OR LONG-TERM CARE | DRG: 065 | End: 2024-01-12
Attending: STUDENT IN AN ORGANIZED HEALTH CARE EDUCATION/TRAINING PROGRAM | Admitting: STUDENT IN AN ORGANIZED HEALTH CARE EDUCATION/TRAINING PROGRAM
Payer: MEDICARE

## 2024-01-02 DIAGNOSIS — G89.29 OTHER CHRONIC PAIN: ICD-10-CM

## 2024-01-02 DIAGNOSIS — N39.0 ACUTE UTI: ICD-10-CM

## 2024-01-02 DIAGNOSIS — I63.9 ACUTE CVA (CEREBROVASCULAR ACCIDENT) (H): Primary | ICD-10-CM

## 2024-01-02 DIAGNOSIS — S52.602A CLOSED FRACTURE OF DISTAL END OF LEFT ULNA, UNSPECIFIED FRACTURE MORPHOLOGY, INITIAL ENCOUNTER: ICD-10-CM

## 2024-01-02 DIAGNOSIS — I25.5 ISCHEMIC CARDIOMYOPATHY: ICD-10-CM

## 2024-01-02 DIAGNOSIS — E78.5 HYPERLIPIDEMIA, UNSPECIFIED HYPERLIPIDEMIA TYPE: ICD-10-CM

## 2024-01-02 DIAGNOSIS — F17.200 TOBACCO USE DISORDER: ICD-10-CM

## 2024-01-02 DIAGNOSIS — S52.202A CLOSED FRACTURE OF SHAFT OF LEFT ULNA, UNSPECIFIED FRACTURE MORPHOLOGY, INITIAL ENCOUNTER: ICD-10-CM

## 2024-01-02 DIAGNOSIS — R20.8 HYPERALGESIA: ICD-10-CM

## 2024-01-02 DIAGNOSIS — R55 SYNCOPE AND COLLAPSE: ICD-10-CM

## 2024-01-02 DIAGNOSIS — F41.9 ANXIETY: ICD-10-CM

## 2024-01-02 PROCEDURE — 29125 APPL SHORT ARM SPLINT STATIC: CPT | Mod: LT | Performed by: STUDENT IN AN ORGANIZED HEALTH CARE EDUCATION/TRAINING PROGRAM

## 2024-01-02 PROCEDURE — 73030 X-RAY EXAM OF SHOULDER: CPT | Mod: 26 | Performed by: RADIOLOGY

## 2024-01-02 PROCEDURE — 99285 EMERGENCY DEPT VISIT HI MDM: CPT | Mod: 25 | Performed by: STUDENT IN AN ORGANIZED HEALTH CARE EDUCATION/TRAINING PROGRAM

## 2024-01-02 PROCEDURE — 73110 X-RAY EXAM OF WRIST: CPT | Mod: LT

## 2024-01-02 PROCEDURE — 81001 URINALYSIS AUTO W/SCOPE: CPT | Performed by: STUDENT IN AN ORGANIZED HEALTH CARE EDUCATION/TRAINING PROGRAM

## 2024-01-02 PROCEDURE — 87086 URINE CULTURE/COLONY COUNT: CPT | Performed by: STUDENT IN AN ORGANIZED HEALTH CARE EDUCATION/TRAINING PROGRAM

## 2024-01-02 PROCEDURE — 83605 ASSAY OF LACTIC ACID: CPT | Performed by: STUDENT IN AN ORGANIZED HEALTH CARE EDUCATION/TRAINING PROGRAM

## 2024-01-02 PROCEDURE — 29125 APPL SHORT ARM SPLINT STATIC: CPT | Performed by: STUDENT IN AN ORGANIZED HEALTH CARE EDUCATION/TRAINING PROGRAM

## 2024-01-02 PROCEDURE — 80307 DRUG TEST PRSMV CHEM ANLYZR: CPT | Performed by: STUDENT IN AN ORGANIZED HEALTH CARE EDUCATION/TRAINING PROGRAM

## 2024-01-02 PROCEDURE — 73110 X-RAY EXAM OF WRIST: CPT | Mod: 26 | Performed by: RADIOLOGY

## 2024-01-02 PROCEDURE — 85004 AUTOMATED DIFF WBC COUNT: CPT | Performed by: STUDENT IN AN ORGANIZED HEALTH CARE EDUCATION/TRAINING PROGRAM

## 2024-01-02 PROCEDURE — 93005 ELECTROCARDIOGRAM TRACING: CPT | Performed by: STUDENT IN AN ORGANIZED HEALTH CARE EDUCATION/TRAINING PROGRAM

## 2024-01-02 PROCEDURE — 83036 HEMOGLOBIN GLYCOSYLATED A1C: CPT

## 2024-01-02 PROCEDURE — 96365 THER/PROPH/DIAG IV INF INIT: CPT | Performed by: STUDENT IN AN ORGANIZED HEALTH CARE EDUCATION/TRAINING PROGRAM

## 2024-01-02 PROCEDURE — 84484 ASSAY OF TROPONIN QUANT: CPT | Performed by: STUDENT IN AN ORGANIZED HEALTH CARE EDUCATION/TRAINING PROGRAM

## 2024-01-02 PROCEDURE — 93010 ELECTROCARDIOGRAM REPORT: CPT | Mod: 59 | Performed by: STUDENT IN AN ORGANIZED HEALTH CARE EDUCATION/TRAINING PROGRAM

## 2024-01-02 PROCEDURE — 80053 COMPREHEN METABOLIC PANEL: CPT | Performed by: STUDENT IN AN ORGANIZED HEALTH CARE EDUCATION/TRAINING PROGRAM

## 2024-01-02 PROCEDURE — 84484 ASSAY OF TROPONIN QUANT: CPT

## 2024-01-02 PROCEDURE — 85610 PROTHROMBIN TIME: CPT | Performed by: STUDENT IN AN ORGANIZED HEALTH CARE EDUCATION/TRAINING PROGRAM

## 2024-01-02 PROCEDURE — 85730 THROMBOPLASTIN TIME PARTIAL: CPT | Performed by: STUDENT IN AN ORGANIZED HEALTH CARE EDUCATION/TRAINING PROGRAM

## 2024-01-02 PROCEDURE — 82077 ASSAY SPEC XCP UR&BREATH IA: CPT | Performed by: STUDENT IN AN ORGANIZED HEALTH CARE EDUCATION/TRAINING PROGRAM

## 2024-01-02 PROCEDURE — 87186 SC STD MICRODIL/AGAR DIL: CPT | Performed by: STUDENT IN AN ORGANIZED HEALTH CARE EDUCATION/TRAINING PROGRAM

## 2024-01-02 PROCEDURE — 84443 ASSAY THYROID STIM HORMONE: CPT | Performed by: STUDENT IN AN ORGANIZED HEALTH CARE EDUCATION/TRAINING PROGRAM

## 2024-01-02 PROCEDURE — 71046 X-RAY EXAM CHEST 2 VIEWS: CPT | Mod: 26 | Performed by: RADIOLOGY

## 2024-01-02 PROCEDURE — 73030 X-RAY EXAM OF SHOULDER: CPT | Mod: LT

## 2024-01-02 PROCEDURE — 71046 X-RAY EXAM CHEST 2 VIEWS: CPT

## 2024-01-02 PROCEDURE — 36415 COLL VENOUS BLD VENIPUNCTURE: CPT | Performed by: STUDENT IN AN ORGANIZED HEALTH CARE EDUCATION/TRAINING PROGRAM

## 2024-01-02 PROCEDURE — 83690 ASSAY OF LIPASE: CPT | Performed by: STUDENT IN AN ORGANIZED HEALTH CARE EDUCATION/TRAINING PROGRAM

## 2024-01-02 PROCEDURE — 82550 ASSAY OF CK (CPK): CPT | Performed by: STUDENT IN AN ORGANIZED HEALTH CARE EDUCATION/TRAINING PROGRAM

## 2024-01-02 PROCEDURE — 96361 HYDRATE IV INFUSION ADD-ON: CPT | Performed by: STUDENT IN AN ORGANIZED HEALTH CARE EDUCATION/TRAINING PROGRAM

## 2024-01-02 PROCEDURE — 83735 ASSAY OF MAGNESIUM: CPT | Performed by: STUDENT IN AN ORGANIZED HEALTH CARE EDUCATION/TRAINING PROGRAM

## 2024-01-02 RX ORDER — ACETAMINOPHEN 500 MG
1000 TABLET ORAL ONCE
Status: COMPLETED | OUTPATIENT
Start: 2024-01-02 | End: 2024-01-03

## 2024-01-03 ENCOUNTER — APPOINTMENT (OUTPATIENT)
Dept: GENERAL RADIOLOGY | Facility: CLINIC | Age: 64
DRG: 065 | End: 2024-01-03
Attending: STUDENT IN AN ORGANIZED HEALTH CARE EDUCATION/TRAINING PROGRAM
Payer: MEDICARE

## 2024-01-03 ENCOUNTER — APPOINTMENT (OUTPATIENT)
Dept: GENERAL RADIOLOGY | Facility: CLINIC | Age: 64
DRG: 065 | End: 2024-01-03
Payer: MEDICARE

## 2024-01-03 ENCOUNTER — APPOINTMENT (OUTPATIENT)
Dept: CARDIOLOGY | Facility: CLINIC | Age: 64
DRG: 065 | End: 2024-01-03
Payer: MEDICARE

## 2024-01-03 ENCOUNTER — APPOINTMENT (OUTPATIENT)
Dept: CT IMAGING | Facility: CLINIC | Age: 64
DRG: 065 | End: 2024-01-03
Attending: STUDENT IN AN ORGANIZED HEALTH CARE EDUCATION/TRAINING PROGRAM
Payer: MEDICARE

## 2024-01-03 ENCOUNTER — APPOINTMENT (OUTPATIENT)
Dept: PHYSICAL THERAPY | Facility: CLINIC | Age: 64
DRG: 065 | End: 2024-01-03
Payer: MEDICARE

## 2024-01-03 ENCOUNTER — APPOINTMENT (OUTPATIENT)
Dept: OCCUPATIONAL THERAPY | Facility: CLINIC | Age: 64
DRG: 065 | End: 2024-01-03
Payer: MEDICARE

## 2024-01-03 ENCOUNTER — APPOINTMENT (OUTPATIENT)
Dept: MRI IMAGING | Facility: CLINIC | Age: 64
DRG: 065 | End: 2024-01-03
Attending: STUDENT IN AN ORGANIZED HEALTH CARE EDUCATION/TRAINING PROGRAM
Payer: MEDICARE

## 2024-01-03 PROBLEM — I63.9 ACUTE CVA (CEREBROVASCULAR ACCIDENT) (H): Status: ACTIVE | Noted: 2024-01-03

## 2024-01-03 LAB
ALBUMIN SERPL BCG-MCNC: 3.6 G/DL (ref 3.5–5.2)
ALBUMIN UR-MCNC: 10 MG/DL
ALP SERPL-CCNC: 69 U/L (ref 40–150)
ALT SERPL W P-5'-P-CCNC: 14 U/L (ref 0–50)
AMPHETAMINES UR QL SCN: ABNORMAL
ANION GAP SERPL CALCULATED.3IONS-SCNC: 9 MMOL/L (ref 7–15)
APPEARANCE UR: ABNORMAL
APTT PPP: 29 SECONDS (ref 22–38)
AST SERPL W P-5'-P-CCNC: 18 U/L (ref 0–45)
ATRIAL RATE - MUSE: 71 BPM
ATRIAL RATE - MUSE: 72 BPM
BACTERIA #/AREA URNS HPF: ABNORMAL /HPF
BARBITURATES UR QL SCN: ABNORMAL
BASOPHILS # BLD AUTO: 0 10E3/UL (ref 0–0.2)
BASOPHILS NFR BLD AUTO: 0 %
BENZODIAZ UR QL SCN: ABNORMAL
BI-PLANE LVEF ECHO: NORMAL
BILIRUB SERPL-MCNC: 0.3 MG/DL
BILIRUB UR QL STRIP: NEGATIVE
BUN SERPL-MCNC: 10.9 MG/DL (ref 8–23)
BZE UR QL SCN: ABNORMAL
CALCIUM SERPL-MCNC: 8.7 MG/DL (ref 8.8–10.2)
CANNABINOIDS UR QL SCN: ABNORMAL
CHLORIDE SERPL-SCNC: 107 MMOL/L (ref 98–107)
CHOLEST SERPL-MCNC: 171 MG/DL
CK SERPL-CCNC: 23 U/L (ref 26–192)
COLOR UR AUTO: YELLOW
CREAT SERPL-MCNC: 0.65 MG/DL (ref 0.51–0.95)
CRP SERPL-MCNC: 40.2 MG/L
DEPRECATED HCO3 PLAS-SCNC: 27 MMOL/L (ref 22–29)
DIASTOLIC BLOOD PRESSURE - MUSE: NORMAL MMHG
DIASTOLIC BLOOD PRESSURE - MUSE: NORMAL MMHG
EGFRCR SERPLBLD CKD-EPI 2021: >90 ML/MIN/1.73M2
EOSINOPHIL # BLD AUTO: 0 10E3/UL (ref 0–0.7)
EOSINOPHIL NFR BLD AUTO: 0 %
ERYTHROCYTE [DISTWIDTH] IN BLOOD BY AUTOMATED COUNT: 12.1 % (ref 10–15)
ERYTHROCYTE [DISTWIDTH] IN BLOOD BY AUTOMATED COUNT: 12.4 % (ref 10–15)
ERYTHROCYTE [SEDIMENTATION RATE] IN BLOOD BY WESTERGREN METHOD: 23 MM/HR (ref 0–30)
ETHANOL SERPL-MCNC: <0.01 G/DL
FENTANYL UR QL: ABNORMAL
GLUCOSE BLDC GLUCOMTR-MCNC: 103 MG/DL (ref 70–99)
GLUCOSE BLDC GLUCOMTR-MCNC: 123 MG/DL (ref 70–99)
GLUCOSE BLDC GLUCOMTR-MCNC: 271 MG/DL (ref 70–99)
GLUCOSE SERPL-MCNC: 105 MG/DL (ref 70–99)
GLUCOSE UR STRIP-MCNC: NEGATIVE MG/DL
HBA1C MFR BLD: 5.9 %
HCT VFR BLD AUTO: 39.4 % (ref 35–47)
HCT VFR BLD AUTO: 42.2 % (ref 35–47)
HDLC SERPL-MCNC: 46 MG/DL
HGB BLD-MCNC: 13.7 G/DL (ref 11.7–15.7)
HGB BLD-MCNC: 14.7 G/DL (ref 11.7–15.7)
HGB UR QL STRIP: ABNORMAL
HOLD SPECIMEN: NORMAL
HOLD SPECIMEN: NORMAL
IMM GRANULOCYTES # BLD: 0 10E3/UL
IMM GRANULOCYTES NFR BLD: 0 %
INR PPP: 0.99 (ref 0.85–1.15)
INTERPRETATION ECG - MUSE: NORMAL
INTERPRETATION ECG - MUSE: NORMAL
KETONES UR STRIP-MCNC: NEGATIVE MG/DL
LACTATE SERPL-SCNC: 0.9 MMOL/L (ref 0.7–2)
LDLC SERPL CALC-MCNC: 109 MG/DL
LEUKOCYTE ESTERASE UR QL STRIP: NEGATIVE
LIPASE SERPL-CCNC: 13 U/L (ref 13–60)
LYMPHOCYTES # BLD AUTO: 3.1 10E3/UL (ref 0.8–5.3)
LYMPHOCYTES NFR BLD AUTO: 37 %
MAGNESIUM SERPL-MCNC: 2.1 MG/DL (ref 1.7–2.3)
MCH RBC QN AUTO: 31.1 PG (ref 26.5–33)
MCH RBC QN AUTO: 31.2 PG (ref 26.5–33)
MCHC RBC AUTO-ENTMCNC: 34.8 G/DL (ref 31.5–36.5)
MCHC RBC AUTO-ENTMCNC: 34.8 G/DL (ref 31.5–36.5)
MCV RBC AUTO: 89 FL (ref 78–100)
MCV RBC AUTO: 90 FL (ref 78–100)
MONOCYTES # BLD AUTO: 0.6 10E3/UL (ref 0–1.3)
MONOCYTES NFR BLD AUTO: 8 %
MUCOUS THREADS #/AREA URNS LPF: PRESENT /LPF
NEUTROPHILS # BLD AUTO: 4.7 10E3/UL (ref 1.6–8.3)
NEUTROPHILS NFR BLD AUTO: 55 %
NITRATE UR QL: POSITIVE
NONHDLC SERPL-MCNC: 125 MG/DL
NRBC # BLD AUTO: 0 10E3/UL
NRBC BLD AUTO-RTO: 0 /100
OPIATES UR QL SCN: ABNORMAL
P AXIS - MUSE: 63 DEGREES
P AXIS - MUSE: 82 DEGREES
PCP QUAL URINE (ROCHE): ABNORMAL
PH UR STRIP: 5.5 [PH] (ref 5–7)
PLATELET # BLD AUTO: 289 10E3/UL (ref 150–450)
PLATELET # BLD AUTO: 311 10E3/UL (ref 150–450)
POTASSIUM SERPL-SCNC: 3.3 MMOL/L (ref 3.4–5.3)
PR INTERVAL - MUSE: 106 MS
PR INTERVAL - MUSE: 88 MS
PROT SERPL-MCNC: 6.4 G/DL (ref 6.4–8.3)
QRS DURATION - MUSE: 74 MS
QRS DURATION - MUSE: 78 MS
QT - MUSE: 398 MS
QT - MUSE: 404 MS
QTC - MUSE: 432 MS
QTC - MUSE: 442 MS
R AXIS - MUSE: 29 DEGREES
R AXIS - MUSE: 4 DEGREES
RBC # BLD AUTO: 4.39 10E6/UL (ref 3.8–5.2)
RBC # BLD AUTO: 4.72 10E6/UL (ref 3.8–5.2)
RBC URINE: 1 /HPF
SODIUM SERPL-SCNC: 143 MMOL/L (ref 135–145)
SP GR UR STRIP: 1.02 (ref 1–1.03)
SYSTOLIC BLOOD PRESSURE - MUSE: NORMAL MMHG
SYSTOLIC BLOOD PRESSURE - MUSE: NORMAL MMHG
T AXIS - MUSE: -29 DEGREES
T AXIS - MUSE: 95 DEGREES
TRIGL SERPL-MCNC: 80 MG/DL
TROPONIN T SERPL HS-MCNC: 13 NG/L
TROPONIN T SERPL HS-MCNC: 14 NG/L
TROPONIN T SERPL HS-MCNC: 15 NG/L
TSH SERPL DL<=0.005 MIU/L-ACNC: 2.99 UIU/ML (ref 0.3–4.2)
UFH PPP CHRO-ACNC: <0.1 IU/ML
UROBILINOGEN UR STRIP-MCNC: NORMAL MG/DL
VENTRICULAR RATE- MUSE: 71 BPM
VENTRICULAR RATE- MUSE: 72 BPM
WBC # BLD AUTO: 6.4 10E3/UL (ref 4–11)
WBC # BLD AUTO: 8.5 10E3/UL (ref 4–11)
WBC URINE: 5 /HPF

## 2024-01-03 PROCEDURE — 80061 LIPID PANEL: CPT

## 2024-01-03 PROCEDURE — A9585 GADOBUTROL INJECTION: HCPCS | Performed by: EMERGENCY MEDICINE

## 2024-01-03 PROCEDURE — 999N000065 XR FOREARM LEFT 2 VIEWS

## 2024-01-03 PROCEDURE — 250N000011 HC RX IP 250 OP 636: Performed by: STUDENT IN AN ORGANIZED HEALTH CARE EDUCATION/TRAINING PROGRAM

## 2024-01-03 PROCEDURE — 73060 X-RAY EXAM OF HUMERUS: CPT | Mod: LT

## 2024-01-03 PROCEDURE — 0PSLXZZ REPOSITION LEFT ULNA, EXTERNAL APPROACH: ICD-10-PCS | Performed by: STUDENT IN AN ORGANIZED HEALTH CARE EDUCATION/TRAINING PROGRAM

## 2024-01-03 PROCEDURE — 255N000002 HC RX 255 OP 636: Performed by: INTERNAL MEDICINE

## 2024-01-03 PROCEDURE — 85520 HEPARIN ASSAY: CPT

## 2024-01-03 PROCEDURE — 73090 X-RAY EXAM OF FOREARM: CPT | Mod: LT

## 2024-01-03 PROCEDURE — 250N000013 HC RX MED GY IP 250 OP 250 PS 637

## 2024-01-03 PROCEDURE — 85652 RBC SED RATE AUTOMATED: CPT

## 2024-01-03 PROCEDURE — 70553 MRI BRAIN STEM W/O & W/DYE: CPT | Mod: 26 | Performed by: RADIOLOGY

## 2024-01-03 PROCEDURE — 82962 GLUCOSE BLOOD TEST: CPT

## 2024-01-03 PROCEDURE — 73110 X-RAY EXAM OF WRIST: CPT | Mod: 26 | Performed by: RADIOLOGY

## 2024-01-03 PROCEDURE — 36415 COLL VENOUS BLD VENIPUNCTURE: CPT

## 2024-01-03 PROCEDURE — 73130 X-RAY EXAM OF HAND: CPT | Mod: 26 | Performed by: RADIOLOGY

## 2024-01-03 PROCEDURE — 73060 X-RAY EXAM OF HUMERUS: CPT | Mod: 26 | Performed by: RADIOLOGY

## 2024-01-03 PROCEDURE — 73110 X-RAY EXAM OF WRIST: CPT | Mod: LT

## 2024-01-03 PROCEDURE — 99222 1ST HOSP IP/OBS MODERATE 55: CPT | Mod: GC | Performed by: STUDENT IN AN ORGANIZED HEALTH CARE EDUCATION/TRAINING PROGRAM

## 2024-01-03 PROCEDURE — 73090 X-RAY EXAM OF FOREARM: CPT | Mod: 26 | Performed by: RADIOLOGY

## 2024-01-03 PROCEDURE — 70553 MRI BRAIN STEM W/O & W/DYE: CPT | Mod: MA

## 2024-01-03 PROCEDURE — 84484 ASSAY OF TROPONIN QUANT: CPT | Performed by: STUDENT IN AN ORGANIZED HEALTH CARE EDUCATION/TRAINING PROGRAM

## 2024-01-03 PROCEDURE — 255N000002 HC RX 255 OP 636: Performed by: EMERGENCY MEDICINE

## 2024-01-03 PROCEDURE — 70498 CT ANGIOGRAPHY NECK: CPT | Mod: 26 | Performed by: RADIOLOGY

## 2024-01-03 PROCEDURE — 87040 BLOOD CULTURE FOR BACTERIA: CPT

## 2024-01-03 PROCEDURE — 250N000011 HC RX IP 250 OP 636

## 2024-01-03 PROCEDURE — 97116 GAIT TRAINING THERAPY: CPT | Mod: GP

## 2024-01-03 PROCEDURE — 250N000013 HC RX MED GY IP 250 OP 250 PS 637: Performed by: STUDENT IN AN ORGANIZED HEALTH CARE EDUCATION/TRAINING PROGRAM

## 2024-01-03 PROCEDURE — 97165 OT EVAL LOW COMPLEX 30 MIN: CPT | Mod: GO

## 2024-01-03 PROCEDURE — 86140 C-REACTIVE PROTEIN: CPT

## 2024-01-03 PROCEDURE — 120N000002 HC R&B MED SURG/OB UMMC

## 2024-01-03 PROCEDURE — 97530 THERAPEUTIC ACTIVITIES: CPT | Mod: GP

## 2024-01-03 PROCEDURE — 70496 CT ANGIOGRAPHY HEAD: CPT | Mod: MA

## 2024-01-03 PROCEDURE — 999N000065 XR WRIST PORT LEFT G/E 3 VIEWS: Mod: LT

## 2024-01-03 PROCEDURE — 258N000003 HC RX IP 258 OP 636: Performed by: STUDENT IN AN ORGANIZED HEALTH CARE EDUCATION/TRAINING PROGRAM

## 2024-01-03 PROCEDURE — 97535 SELF CARE MNGMENT TRAINING: CPT | Mod: GO

## 2024-01-03 PROCEDURE — 999N000065 XR HAND PORT LEFT G/E 3 VIEWS: Mod: LT

## 2024-01-03 PROCEDURE — 93306 TTE W/DOPPLER COMPLETE: CPT | Mod: 26 | Performed by: INTERNAL MEDICINE

## 2024-01-03 PROCEDURE — 36415 COLL VENOUS BLD VENIPUNCTURE: CPT | Performed by: STUDENT IN AN ORGANIZED HEALTH CARE EDUCATION/TRAINING PROGRAM

## 2024-01-03 PROCEDURE — 250N000012 HC RX MED GY IP 250 OP 636 PS 637

## 2024-01-03 PROCEDURE — 999N000226 HC STATISTIC SLP IP EVAL DEFER

## 2024-01-03 PROCEDURE — 70450 CT HEAD/BRAIN W/O DYE: CPT | Mod: MA

## 2024-01-03 PROCEDURE — 99204 OFFICE O/P NEW MOD 45 MIN: CPT | Mod: GC | Performed by: INTERNAL MEDICINE

## 2024-01-03 PROCEDURE — 85041 AUTOMATED RBC COUNT: CPT

## 2024-01-03 PROCEDURE — 70496 CT ANGIOGRAPHY HEAD: CPT | Mod: 26 | Performed by: RADIOLOGY

## 2024-01-03 PROCEDURE — 97161 PT EVAL LOW COMPLEX 20 MIN: CPT | Mod: GP

## 2024-01-03 RX ORDER — PROCHLORPERAZINE 25 MG
25 SUPPOSITORY, RECTAL RECTAL EVERY 12 HOURS PRN
Status: DISCONTINUED | OUTPATIENT
Start: 2024-01-03 | End: 2024-01-11

## 2024-01-03 RX ORDER — HEPARIN SODIUM 5000 [USP'U]/.5ML
5000 INJECTION, SOLUTION INTRAVENOUS; SUBCUTANEOUS EVERY 8 HOURS
Status: DISCONTINUED | OUTPATIENT
Start: 2024-01-03 | End: 2024-01-03

## 2024-01-03 RX ORDER — ACETAMINOPHEN 500 MG
500 TABLET ORAL EVERY 6 HOURS PRN
COMMUNITY

## 2024-01-03 RX ORDER — NICOTINE 21 MG/24HR
1 PATCH, TRANSDERMAL 24 HOURS TRANSDERMAL DAILY
Status: DISCONTINUED | OUTPATIENT
Start: 2024-01-03 | End: 2024-01-06

## 2024-01-03 RX ORDER — ONDANSETRON 4 MG/1
4 TABLET, ORALLY DISINTEGRATING ORAL EVERY 6 HOURS PRN
Status: DISCONTINUED | OUTPATIENT
Start: 2024-01-03 | End: 2024-01-12 | Stop reason: HOSPADM

## 2024-01-03 RX ORDER — CEFTRIAXONE 1 G/1
1 INJECTION, POWDER, FOR SOLUTION INTRAMUSCULAR; INTRAVENOUS EVERY 24 HOURS
Qty: 20 ML | Refills: 0 | Status: DISCONTINUED | OUTPATIENT
Start: 2024-01-04 | End: 2024-01-03

## 2024-01-03 RX ORDER — DEXTROSE MONOHYDRATE 25 G/50ML
25-50 INJECTION, SOLUTION INTRAVENOUS
Status: DISCONTINUED | OUTPATIENT
Start: 2024-01-03 | End: 2024-01-06

## 2024-01-03 RX ORDER — ACETAMINOPHEN 325 MG/1
650 TABLET ORAL EVERY 4 HOURS PRN
Status: DISCONTINUED | OUTPATIENT
Start: 2024-01-03 | End: 2024-01-12 | Stop reason: HOSPADM

## 2024-01-03 RX ORDER — ONDANSETRON 2 MG/ML
4 INJECTION INTRAMUSCULAR; INTRAVENOUS EVERY 6 HOURS PRN
Status: DISCONTINUED | OUTPATIENT
Start: 2024-01-03 | End: 2024-01-12 | Stop reason: HOSPADM

## 2024-01-03 RX ORDER — HYDRALAZINE HYDROCHLORIDE 20 MG/ML
10-20 INJECTION INTRAMUSCULAR; INTRAVENOUS
Status: DISCONTINUED | OUTPATIENT
Start: 2024-01-03 | End: 2024-01-12 | Stop reason: HOSPADM

## 2024-01-03 RX ORDER — NICOTINE POLACRILEX 4 MG
15-30 LOZENGE BUCCAL
Status: DISCONTINUED | OUTPATIENT
Start: 2024-01-03 | End: 2024-01-06

## 2024-01-03 RX ORDER — GADOBUTROL 604.72 MG/ML
4.5 INJECTION INTRAVENOUS ONCE
Status: COMPLETED | OUTPATIENT
Start: 2024-01-03 | End: 2024-01-03

## 2024-01-03 RX ORDER — LABETALOL HYDROCHLORIDE 5 MG/ML
10-20 INJECTION, SOLUTION INTRAVENOUS EVERY 10 MIN PRN
Status: DISCONTINUED | OUTPATIENT
Start: 2024-01-03 | End: 2024-01-12 | Stop reason: HOSPADM

## 2024-01-03 RX ORDER — IOPAMIDOL 755 MG/ML
67 INJECTION, SOLUTION INTRAVASCULAR ONCE
Status: COMPLETED | OUTPATIENT
Start: 2024-01-03 | End: 2024-01-03

## 2024-01-03 RX ORDER — HEPARIN SODIUM 10000 [USP'U]/100ML
0-5000 INJECTION, SOLUTION INTRAVENOUS CONTINUOUS
Status: DISPENSED | OUTPATIENT
Start: 2024-01-03 | End: 2024-01-05

## 2024-01-03 RX ORDER — VALSARTAN 160 MG/1
160 TABLET ORAL DAILY
Status: DISCONTINUED | OUTPATIENT
Start: 2024-01-03 | End: 2024-01-03

## 2024-01-03 RX ORDER — CEFTRIAXONE 1 G/1
1 INJECTION, POWDER, FOR SOLUTION INTRAMUSCULAR; INTRAVENOUS ONCE
Status: COMPLETED | OUTPATIENT
Start: 2024-01-03 | End: 2024-01-03

## 2024-01-03 RX ORDER — ASPIRIN 81 MG/1
81 TABLET, CHEWABLE ORAL DAILY
Status: DISCONTINUED | OUTPATIENT
Start: 2024-01-03 | End: 2024-01-12 | Stop reason: HOSPADM

## 2024-01-03 RX ORDER — PROCHLORPERAZINE MALEATE 10 MG
10 TABLET ORAL EVERY 6 HOURS PRN
Status: DISCONTINUED | OUTPATIENT
Start: 2024-01-03 | End: 2024-01-11

## 2024-01-03 RX ORDER — CEFTRIAXONE 1 G/1
1 INJECTION, POWDER, FOR SOLUTION INTRAMUSCULAR; INTRAVENOUS EVERY 24 HOURS
Status: COMPLETED | OUTPATIENT
Start: 2024-01-04 | End: 2024-01-05

## 2024-01-03 RX ORDER — QUETIAPINE FUMARATE 50 MG/1
50 TABLET, FILM COATED ORAL AT BEDTIME
COMMUNITY
Start: 2023-11-17

## 2024-01-03 RX ORDER — LIDOCAINE 40 MG/G
CREAM TOPICAL
Status: DISCONTINUED | OUTPATIENT
Start: 2024-01-03 | End: 2024-01-12 | Stop reason: HOSPADM

## 2024-01-03 RX ADMIN — ACETAMINOPHEN 650 MG: 325 TABLET, FILM COATED ORAL at 20:26

## 2024-01-03 RX ADMIN — GADOBUTROL 4.5 ML: 604.72 INJECTION INTRAVENOUS at 03:33

## 2024-01-03 RX ADMIN — NICOTINE 1 PATCH: 14 PATCH, EXTENDED RELEASE TRANSDERMAL at 08:23

## 2024-01-03 RX ADMIN — SODIUM CHLORIDE, POTASSIUM CHLORIDE, SODIUM LACTATE AND CALCIUM CHLORIDE 1000 ML: 600; 310; 30; 20 INJECTION, SOLUTION INTRAVENOUS at 00:03

## 2024-01-03 RX ADMIN — ACETAMINOPHEN 1000 MG: 500 TABLET ORAL at 00:01

## 2024-01-03 RX ADMIN — ASPIRIN 81 MG CHEWABLE TABLET 81 MG: 81 TABLET CHEWABLE at 08:23

## 2024-01-03 RX ADMIN — HEPARIN SODIUM 5000 UNITS: 5000 INJECTION, SOLUTION INTRAVENOUS; SUBCUTANEOUS at 05:55

## 2024-01-03 RX ADMIN — PERFLUTREN 6 ML: 6.52 INJECTION, SUSPENSION INTRAVENOUS at 09:35

## 2024-01-03 RX ADMIN — HEPARIN SODIUM AND DEXTROSE 800 UNITS/HR: 10000; 5 INJECTION INTRAVENOUS at 14:00

## 2024-01-03 RX ADMIN — INSULIN ASPART 3 UNITS: 100 INJECTION, SOLUTION INTRAVENOUS; SUBCUTANEOUS at 14:09

## 2024-01-03 RX ADMIN — CEFTRIAXONE SODIUM 1 G: 1 INJECTION, POWDER, FOR SOLUTION INTRAMUSCULAR; INTRAVENOUS at 02:10

## 2024-01-03 RX ADMIN — IOPAMIDOL 67 ML: 755 INJECTION, SOLUTION INTRAVENOUS at 01:18

## 2024-01-03 ASSESSMENT — ACTIVITIES OF DAILY LIVING (ADL)
ADLS_ACUITY_SCORE: 35
ADLS_ACUITY_SCORE: 43
ADLS_ACUITY_SCORE: 35
DEPENDENT_IADLS:: TRANSPORTATION
ADLS_ACUITY_SCORE: 42
ADLS_ACUITY_SCORE: 35
ADLS_ACUITY_SCORE: 45
ADLS_ACUITY_SCORE: 43
ADLS_ACUITY_SCORE: 35
ADLS_ACUITY_SCORE: 42
ADLS_ACUITY_SCORE: 35
ADLS_ACUITY_SCORE: 42
ADLS_ACUITY_SCORE: 45

## 2024-01-03 NOTE — MEDICATION SCRIBE - ADMISSION MEDICATION HISTORY
Medication Scribe Admission Medication History    Admission medication history is complete. The information provided in this note is only as accurate as the sources available at the time of the update.    Information Source(s): Patient and CareEverywhere/SureScripts via in-person    Pertinent Information: Patient seemed not sure of most of her medications and could not understand most of what she said. She stated to have accidentally taken Metoprolol recently mistaking it for one of her other meds.     Changes made to PTA medication list:  Added: acetaminophen 500 mg, Quetiapine 50 mg, tizanidine 4 mg.  Deleted: Patient not taking, amitryptiline 10 mg, aspirin 81 mg, aspirin 325 mg, dicyclomine 10 mg, levalbuterol inhaler, metoprolol 50 mg, nitroglycerin 0.4 mg SL, promethazine 25 mg, valsartan 160 mg.  Changed: None    Medication Affordability:  Not including over the counter (OTC) medications, was there a time in the past 3 months when you did not take your medications as prescribed because of cost?: No    Allergies reviewed with patient and updates made in EHR: yes    Medication History Completed By: Meri Feliz 1/3/2024 1:10 PM    Prior to Admission medications    Medication Sig Last Dose Taking? Auth Provider Long Term End Date   acetaminophen (TYLENOL) 500 MG tablet Take 500 mg by mouth every 6 hours as needed Past Week at Unknown Yes Reported, Patient     cefpodoxime (VANTIN) 200 MG tablet Take 1 tablet (200 mg) by mouth 2 times daily Unknown at Unknown Yes Charlotte Cedillo MD     metoprolol succinate ER (TOPROL XL) 25 MG 24 hr tablet Take 0.5 tablets (12.5 mg) by mouth daily Unknown at Unknown Yes Rose Fierro MD Yes    omeprazole 20 MG tablet Take 1 tablet (20 mg) by mouth daily Take 30-60 minutes before a meal. Past Month at Unknown Yes Teetee Varghese MD     QUEtiapine (SEROQUEL) 50 MG tablet Take 50 mg by mouth at bedtime Past Month at Unknown Yes Reported, Patient     tiZANidine  (ZANAFLEX) 4 MG tablet TAKE 1 TABLET BY MOUTH EVERY 8 TO 12 HOURS AS NEEDED AND/OR AT BEDTIME. OR AS DIRECTED UP TO 24MG/DAY Past Month at Unknown Yes Reported, Patient

## 2024-01-03 NOTE — ED NOTES
Bed: ED10  Expected date:   Expected time:   Means of arrival:   Comments:  [facility]   63F  Fall  Been having Left sided weakness  VSS

## 2024-01-03 NOTE — ED TRIAGE NOTES
"Pt biba from ex husbands from home where pt has been staying. Pt has fall onto left side today. C/o pain in L wrist/shoulder. Denies loc     Pt has a hx of l sided weakness. Pt states \"I have been having weakness for a week\"     Bg 245   20g right arm          "

## 2024-01-03 NOTE — ED PROVIDER NOTES
[place] EMERGENCY DEPARTMENT ([facility])    1/02/24       ED PROVIDER NOTE    History     Chief Complaint   Patient presents with    Fall     The history is provided by the patient and medical records.     Louann Lofton is a 63 year old female with PMH of ischemic cardiomyopathy, GERD s/p Nissen fundoplication (2009), asthma, alcohol abuse, tobacco use, depression/anxiety and PTSD who presents to the Emergency Department today due to fall on her left side. Patient reports a fall when she walked in the kitchen after waking up.  She is not sure exactly what time this occurred but states it was in the morning.  She states that she has been on the ground for 1 hour before her sister came and helped her get up.  She states she was then moved to a couch on the left there.  She is unclear with why an ambulance was not called earlier but she essentially states that either no one thought to call one or was able to call an ambulance until later in the evening.  She reports pain in her left wrist and shoulder, especially when moving her body. She also reports numbness in her left arm. She did not take any medications for pain. Patient is unsure how she fell. She does not remember whether she hit her head or not. Patient denies nausea, vomiting, neck pain. She denies alcohol use. Patient smoked this morning. When being asked about recreational drug use, patient showed frustration and angry about medical classification of marijuana. Of note, patient reports multiple fall recently.      Patient states that she has been having weakness and paresthesias on her left side for at least 1 week prior to this incident.    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    Acute coronary syndrome-[place]    Coronary artery disease     Diaphragmatic hernia without mention of obstruction or gangrene 5/2/06    Dyslipidemia     Eczema herpeticum     Esophageal reflux     Folds in Descemet's membrane 10/9/2008    Hyperlipidemia     HYPERLIPIDEMIA NEC/NOS 9/25/2006    Irritable bowel syndrome     Ischemic cardiomyopathy     [place] 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, ejection fraction 48%    LV thrombus 12/1/2006    Major depressive disorder, single episode, severe, without mention of psychotic behavior 2006    major depressive disorder    MI NOS EPISODE CARE UNSPEC 3/6/2006    Mitral valve disorders(424.0) 2/25/06    Hosp - apical ballooning syndrome and acute coronary syndrome    Palpitations     PARASITIC DISEASE NEC, 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    Pure hypercholesterolemia     RECURR DEPR PSYCHOS-UNSP 3/20/2006    SEQUELAE BHAVIN INFRCT,OTHR NEC 11/29/2006    Tobacco abuse     Tobacco use disorder      Past Surgical History:   Procedure Laterality Date    HC ESOPHAGOSCOPY, DIAGNOSTIC  5/2/06    HC REMOVAL GALLBLADDER       amitriptyline (ELAVIL) 10 MG tablet  aspirin (ASA) 81 MG chewable tablet  aspirin 325 MG EC tablet  cefpodoxime (VANTIN) 200 MG tablet  dicyclomine (BENTYL) 10 MG capsule  levalbuterol (XOPENEX HFA) 45 MCG/ACT inhaler  metoprolol (TOPROL XL) 50 MG 24 hr tablet  metoprolol succinate ER (TOPROL XL) 25 MG 24 hr tablet  nitroglycerin (NITROSTAT) 0.4 MG SL tablet  omeprazole 20 MG tablet  promethazine (PHENERGAN) 25 MG tablet  valsartan (DIOVAN) 160 MG tablet      Allergies   Allergen Reactions    Neurontin [Gabapentin Enacarbil] Other (See Comments)     Pt was admitted for weakness at [facility] after starting Neurontin.    Aleve [Naproxen Sodium] Hives and Swelling     lips swelled    Nsaids      Noted in 5/18/08 ER    Paxil [Paroxetine]      rash     "Ultram [Tramadol Hcl] Swelling     Family History  Family History   Problem Relation Age of Onset    Cancer Mother         ovarian    C.A.D. Mother     Breast Cancer Mother     Respiratory Mother         COPD    Gastrointestinal Disease Daughter         Bleeding ulcers    Gastrointestinal Disease Daughter         Colitis     Social History   Social History     Tobacco Use    Smoking status: Every Day     Packs/day: 0.50     Years: 20.00     Additional pack years: 0.00     Total pack years: 10.00     Types: Cigarettes     Last attempt to quit: 2011     Years since quittin.7    Smokeless tobacco: Never    Tobacco comments:     On and off   Substance Use Topics    Alcohol use: Yes     Comment: denies at this time, hx of abuse, none at this time    Drug use: No         A medically appropriate review of systems was performed with pertinent positives and negatives noted in the HPI, and all other systems negative.    Physical Exam   BP: 128/71  Pulse: 77  Temp: 98.4  F (36.9  C)  Resp: 18  Height: 160 cm (5' 3\")  Weight: 45.4 kg (100 lb)  SpO2: 98 %  Physical Exam  Vital Signs Reviewed  Gen: Well nourished, well developed, resting comfortably, no acute distress  HEENT: NC/AT, PERRL, EOMI, MMM  Neck: Supple, FROM  CV: Regular Rate  Lungs/Chest: Normal Effort  Abd: Non-distended  MSK/Back: FROM, no visible deformity  Neuro: A&Ox3, GCS 15, CN II-XII unremarkable.  Strongly decreased sensation in the left arm and leg compared to the right arm and leg.  Intact strength bilateral upper extremities 5/5 with no drift.  4/5 strength left lower extremity compared to 5/5 strength right lower extremity.  4/5 strength proximal left lower extremity.  4+ out of 5 strength distal left lower extremity.  Gait assessment deferred.  Skin: Warm, Dry, Intact, no visible lesions      ED Course, Procedures, & Data      Patient arrives to the emergency department with complaints of mechanical fall versus syncope occurring earlier " today.  Patient with several days of neurologic symptoms.  Even if onset of symptoms is taken as the earliest onset of symptoms today she is well outside the 4.5-hour window  CTA/CT head ordered, labs and trauma imaging ordered  Patient's labs reviewed, notable for a slightly elevated troponin with out evidence of acute ischemia  Urinalysis consistent with UTI, ceftriaxone ordered  Received call from [facility], CT imaging concerning for late acute versus subacute infarcts  Stroke neurology consulted, recommending MRI before decision on antiplatelet therapy is made  XR with distal left ulnar fracture, non-displaced - splinted  Patient signed out to Dr. Hughes to follow-up on neuro recs and admit  [facility]    -Fracture    Date/Time: 1/3/2024 7:03 PM    Performed by: Franklin Garcia MD  Authorized by: Franklin Garcia MD    Risks, benefits and alternatives discussed.      INJURY      Injury location:  Forearm    Forearm injury location:  L forearm    Forearm fracture type comment:  Distal ulnar    PRE PROCEDURE ASSESSMENT      Neurological function: normal      Distal perfusion: normal      Range of motion: reduced      Range of motion comment:  2/2 pain    ANESTHESIA (see MAR for exact dosages)      Anesthesia method:  None    PROCEDURE DETAILS:     Manipulation performed: no      Immobilization:  Splint    Splint type: sandwich short arm.    Supplies used:  Ortho-Glass, elastic bandage and cotton padding    POST PROCEDURE ASSESSMENT      Neurological function: normal      Distal perfusion: normal      Range of motion: unchanged        PROCEDURE  Describe Procedure: Patient with non-displaced distal left ulnar fracture. Short arm sandwich splinting applied with stockinette, cotton padding, ortho glass, and ace wrap. Patient tolerated well without complication.  Patient Tolerance:  Patient tolerated the procedure well with no immediate complications               EKG Interpretation:      Interpreted by Franklin Garcia MD  Time reviewed: 2350  Symptoms at time of EKG: Syncope   Rhythm: normal sinus   Rate: Normal  Axis: Normal  Ectopy: none  Conduction: normal  ST Segments/ T Waves: Non-specific ST-T wave changes      Clinical Impression: Normal sinus rhythm with nonspecific changes, no DAJA                       Results for orders placed or performed during the hospital encounter of 01/02/24   XR Chest 2 Views     Status: None    Narrative    EXAM: XR CHEST 2 VIEWS  LOCATION: [facility]  DATE: 1/3/2024    INDICATION: Fall  COMPARISON: 04/22/2011      Impression    IMPRESSION: Negative chest.   XR Shoulder Left G/E 3 Views     Status: None    Narrative    EXAM: XR SHOULDER LEFT G/E 3 VIEWS  LOCATION: [facility]  DATE: 1/3/2024    INDICATION: Fall, pain  COMPARISON: None.      Impression    IMPRESSION: Normal left shoulder joint spaces and alignment. No left shoulder fracture. Presumed old mid left posterior rib fracture. Clinical correlation recommended.   XR Wrist Left G/E 3 Views     Status: None    Narrative    EXAM: XR WRIST LEFT G/E 3 VIEWS  LOCATION: [facility]  DATE: 1/3/2024    INDICATION: Fall, pain  COMPARISON: None.      Impression    IMPRESSION: There is a nondisplaced oblique fracture through the distal left ulna. No dislocation.   Humerus XR,  G/E 2 views, left     Status: None    Narrative    EXAM: XR HUMERUS LEFT G/E 2 VIEWS  LOCATION: [facility]  DATE: 1/3/2024    INDICATION: Fall, Pain  COMPARISON: None.      Impression    IMPRESSION: Left humerus within normal limits. No fracture.   Head CT w/o contrast     Status: None    Narrative    EXAM: CT HEAD W/O CONTRAST  LOCATION: [facility]  DATE: 1/3/2024    INDICATION: Fall,  left-sided weakness, paresthesias for one week.  COMPARISON: CT head dated 04/19/2023.  TECHNIQUE: Routine CT Head without IV contrast. Multiplanar reformats. Dose reduction techniques were used.    FINDINGS:  INTRACRANIAL CONTENTS: No acute hemorrhage or abnormal extra-axial fluid collection. There are new bilateral inferior cerebellar age-indeterminate lacunar-type infarctions. Stable chronic left superior cerebellar lacunar-type infarction. There is new   focal hypodensity involving the right thalamus as well as a prominent region of transcortical hypodensity identified involving the right occipital lobe. No hemorrhagic conversion or associated hemorrhage identified. Otherwise stable brain parenchymal   attenuation noted elsewhere. Normal ventricles and sulci.     VISUALIZED ORBITS/SINUSES/MASTOIDS: No intraorbital abnormality. No paranasal sinus mucosal disease. No middle ear or mastoid effusion.    BONES/SOFT TISSUES: No acute abnormality.      Impression    IMPRESSION:  1.  New transcortical region of hypodensity identified involving the right occipital lobe with focal hypodensity involving the right thalamus. Findings are age-indeterminate, but concerning for a late acute to subacute ischemic injury within the right   PCA territory.   2.  Age-indeterminate bilateral inferior cerebellar lacunar-type infarctions, new since prior exam.  3.  Chronic left cerebellar infarct.  4.  No hemorrhage.          Dr. Castro Leroy discussed results with Dr. Franklin Goins on 01/03/2024 at 1:52 AM.   CTA Head Neck w Contrast     Status: None    Narrative    EXAM: CTA HEAD NECK W CONTRAST  LOCATION: [facility]  DATE: 1/3/2024    INDICATION: Fall, left-sided weakness and paresthesias one week.  COMPARISON: None.  CONTRAST: Iopamidol (ISOVUE 370) solution 67 mL.  TECHNIQUE: Head and neck CT angiogram with IV contrast. Axial helical CT images of the head and neck vessels  obtained during the arterial phase of intravenous contrast administration. Axial 2D reconstructed images and multiplanar 3D MIP reconstructed   images of the head and neck vessels were performed by the technologist. Dose reduction techniques were used. All stenosis measurements made according to NASCET criteria unless otherwise specified.    FINDINGS:     HEAD CTA:  ANTERIOR CIRCULATION: Calcific plaque noted involving the right supraclinoid ICA causing mild to moderate narrowing. Calcific plaque involving the left cavernous and supraclinoid ICA causing mild narrowing. No proximal large vessel occlusion. At the   level of the left A2/A3 anterior cerebral artery, there is a focal lobular outpouching at the junction of branching anterior cerebral artery vessels measuring 2.5 mm in craniocaudal dimensions, 4 mm in AP dimensions and approximately 3 mm in transverse   dimensions (image 190 series 6 and image 45 series 9), consistent with a left anterior cerebral artery aneurysm. No high-flow vascular malformation. Standard [place] of [place] anatomy.    POSTERIOR CIRCULATION: There is mild short segment narrowing of the distal left intradural vertebral artery. There is severe short segment narrowing of the distal right P2 posterior cerebral artery with faint opacification of the distal right P3/P4   segments of the posterior cerebral artery beyond this finding. No definite proximal large vessel occlusion. No aneurysm. No high-flow vascular malformation. Dominant left and smaller right vertebral artery contribute to a normal basilar artery.     DURAL VENOUS SINUSES: Expected enhancement of the major dural venous sinuses.    NECK CTA:  RIGHT CAROTID: Dense calcific plaque involving the proximal right cervical ICA with less than 50% stenosis. No high-grade stenosis. No dissection.    LEFT CAROTID: Scattered plaque within the left carotid system with less than 50% narrowing. No high-grade stenosis. No dissection.    VERTEBRAL  ARTERIES: No focal stenosis or dissection. Balanced vertebral arteries.    AORTIC ARCH: Irregular calcified and noncalcified plaque noted along the aortic arch. Bovine aortic arch anatomy. Mild narrowing involving the proximal and origin of the left subclavian artery. Minimal narrowing identified involving the origin of the   left common carotid artery. No high-grade stenosis involving the origins of the great vessels.    NONVASCULAR STRUCTURES: Right thyroid lobe nodule with extension into the isthmus measuring up to 2 cm.      Impression    IMPRESSION:   HEAD CTA:   1.  No definite proximal large vessel occlusion identified.  2.  Severe short segment stenosis/narrowing of the distal right P2 posterior cerebral artery with faint opacification of the right P3/P4 posterior cerebral arteries beyond this finding, possibly related to a subocclusive thrombus.   3.  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 Follow-up Neuro Interventional Radiology consultation is recommended.  4.  Mild to moderate stenosis of the right cerebral clinoid ICA.  5.  Additional findings, as above.    NECK CTA:  1.  Atheromatous disease, as above. No high-grade stenosis or dissection.  2.  Low-density thyroid nodule measuring up to 2 cm. Recommend non-emergent thyroid function testing and thyroid ultrasound for further assessment.    Dr. Castro Leroy discussed results with Dr. Franklin Goins on 1/3/2024 2:08 AM CST.   INR     Status: Normal   Result Value Ref Range    INR 0.99 0.85 - 1.15   Partial thromboplastin time     Status: Normal   Result Value Ref Range    aPTT 29 22 - 38 Seconds   Comprehensive metabolic panel     Status: Abnormal   Result Value Ref Range    Sodium 143 135 - 145 mmol/L    Potassium 3.3 (L) 3.4 - 5.3 mmol/L    Carbon Dioxide (CO2) 27 22 - 29 mmol/L    Anion Gap 9 7 - 15 mmol/L    Urea Nitrogen 10.9 8.0 - 23.0 mg/dL    Creatinine 0.65 0.51 - 0.95 mg/dL    GFR Estimate >90 >60 mL/min/1.73m2     Calcium 8.7 (L) 8.8 - 10.2 mg/dL    Chloride 107 98 - 107 mmol/L    Glucose 105 (H) 70 - 99 mg/dL    Alkaline Phosphatase 69 40 - 150 U/L    AST 18 0 - 45 U/L    ALT 14 0 - 50 U/L    Protein Total 6.4 6.4 - 8.3 g/dL    Albumin 3.6 3.5 - 5.2 g/dL    Bilirubin Total 0.3 <=1.2 mg/dL   Lipase     Status: Normal   Result Value Ref Range    Lipase 13 13 - 60 U/L   Lactic acid whole blood     Status: Normal   Result Value Ref Range    Lactic Acid 0.9 0.7 - 2.0 mmol/L   Troponin T, High Sensitivity     Status: Abnormal   Result Value Ref Range    Troponin T, High Sensitivity 15 (H) <=14 ng/L   Magnesium     Status: Normal   Result Value Ref Range    Magnesium 2.1 1.7 - 2.3 mg/dL   TSH with free T4 reflex     Status: Normal   Result Value Ref Range    TSH 2.99 0.30 - 4.20 uIU/mL   UA with Microscopic reflex to Culture     Status: Abnormal    Specimen: Urine, Midstream   Result Value Ref Range    Color Urine Yellow Colorless, Straw, Light Yellow, Yellow    Appearance Urine Slightly Cloudy (A) Clear    Glucose Urine Negative Negative mg/dL    Bilirubin Urine Negative Negative    Ketones Urine Negative Negative mg/dL    Specific Gravity Urine 1.021 1.003 - 1.035    Blood Urine Trace (A) Negative    pH Urine 5.5 5.0 - 7.0    Protein Albumin Urine 10 (A) Negative mg/dL    Urobilinogen Urine Normal Normal, 2.0 mg/dL    Nitrite Urine Positive (A) Negative    Leukocyte Esterase Urine Negative Negative    Bacteria Urine Many (A) None Seen /HPF    Mucus Urine Present (A) None Seen /LPF    RBC Urine 1 <=2 /HPF    WBC Urine 5 <=5 /HPF    Narrative    Urine Culture ordered based on laboratory criteria   Ethyl Alcohol Level     Status: Normal   Result Value Ref Range    Alcohol ethyl <0.01 <=0.01 g/dL   CK total     Status: Abnormal   Result Value Ref Range    CK 23 (L) 26 - 192 U/L   Urine Drug Screen Panel     Status: Abnormal   Result Value Ref Range    Amphetamines Urine Screen Positive (A) Screen Negative    Barbituates Urine  Screen Negative Screen Negative    Benzodiazepine Urine Screen Negative Screen Negative    Cannabinoids Urine Screen Positive (A) Screen Negative    Cocaine Urine Screen Negative Screen Negative    Fentanyl Qual Urine Screen Negative Screen Negative    Opiates Urine Screen Negative Screen Negative    PCP Urine Screen Negative Screen Negative   CBC with platelets and differential     Status: None   Result Value Ref Range    WBC Count 8.5 4.0 - 11.0 10e3/uL    RBC Count 4.72 3.80 - 5.20 10e6/uL    Hemoglobin 14.7 11.7 - 15.7 g/dL    Hematocrit 42.2 35.0 - 47.0 %    MCV 89 78 - 100 fL    MCH 31.1 26.5 - 33.0 pg    MCHC 34.8 31.5 - 36.5 g/dL    RDW 12.4 10.0 - 15.0 %    Platelet Count 311 150 - 450 10e3/uL    % Neutrophils 55 %    % Lymphocytes 37 %    % Monocytes 8 %    % Eosinophils 0 %    % Basophils 0 %    % Immature Granulocytes 0 %    NRBCs per 100 WBC 0 <1 /100    Absolute Neutrophils 4.7 1.6 - 8.3 10e3/uL    Absolute Lymphocytes 3.1 0.8 - 5.3 10e3/uL    Absolute Monocytes 0.6 0.0 - 1.3 10e3/uL    Absolute Eosinophils 0.0 0.0 - 0.7 10e3/uL    Absolute Basophils 0.0 0.0 - 0.2 10e3/uL    Absolute Immature Granulocytes 0.0 <=0.4 10e3/uL    Absolute NRBCs 0.0 10e3/uL   Troponin T, High Sensitivity     Status: Normal   Result Value Ref Range    Troponin T, High Sensitivity 13 <=14 ng/L   EKG 12-lead, tracing only     Status: None (Preliminary result)   Result Value Ref Range    Systolic Blood Pressure  mmHg    Diastolic Blood Pressure  mmHg    Ventricular Rate 72 BPM    Atrial Rate 72 BPM    IL Interval 88 ms    QRS Duration 78 ms     ms    QTc 442 ms    P Axis 82 degrees    R AXIS 29 degrees    T Axis -29 degrees    Interpretation ECG       Sinus rhythm with short IL  Right atrial enlargement  Possible Inferior infarct , age undetermined  Anteroseptal infarct , age undetermined  ST & T wave abnormality, consider lateral ischemia  Abnormal ECG     CBC with platelets differential     Status: None    Narrative     The following orders were created for panel order CBC with platelets differential.  Procedure                               Abnormality         Status                     ---------                               -----------         ------                     CBC with platelets and d...[[id number]]                      Final result                 Please view results for these tests on the individual orders.   Urine Drug Screen     Status: Abnormal    Narrative    The following orders were created for panel order Urine Drug Screen.  Procedure                               Abnormality         Status                     ---------                               -----------         ------                     Urine Drug Screen Panel[[id number]]      Abnormal            Final result                 Please view results for these tests on the individual orders.     Medications   lactated ringers BOLUS 1,000 mL (0 mLs Intravenous Stopped 1/3/24 0210)   acetaminophen (TYLENOL) tablet 1,000 mg (1,000 mg Oral $Given 1/3/24 0001)   iopamidol (ISOVUE-370) solution 67 mL (67 mLs Intravenous $Given 1/3/24 0118)   sodium chloride (PF) 0.9% PF flush 90 mL (90 mLs Intravenous $Given 1/3/24 0118)   cefTRIAXone (ROCEPHIN) 1 g vial to attach to  mL bag for ADULTS or NS 50 mL bag for PEDS (0 g Intravenous Stopped 1/3/24 0305)   gadobutrol (GADAVIST) injection 4.5 mL (4.5 mLs Intravenous $Given 1/3/24 0333)     Labs Ordered and Resulted from Time of ED Arrival to Time of ED Departure   COMPREHENSIVE METABOLIC PANEL - Abnormal       Result Value    Sodium 143      Potassium 3.3 (*)     Carbon Dioxide (CO2) 27      Anion Gap 9      Urea Nitrogen 10.9      Creatinine 0.65      GFR Estimate >90      Calcium 8.7 (*)     Chloride 107      Glucose 105 (*)     Alkaline Phosphatase 69      AST 18      ALT 14      Protein Total 6.4      Albumin 3.6      Bilirubin Total 0.3     TROPONIN T, HIGH SENSITIVITY - Abnormal    Troponin T, High Sensitivity  15 (*)    ROUTINE UA WITH MICROSCOPIC REFLEX TO CULTURE - Abnormal    Color Urine Yellow      Appearance Urine Slightly Cloudy (*)     Glucose Urine Negative      Bilirubin Urine Negative      Ketones Urine Negative      Specific Gravity Urine 1.021      Blood Urine Trace (*)     pH Urine 5.5      Protein Albumin Urine 10 (*)     Urobilinogen Urine Normal      Nitrite Urine Positive (*)     Leukocyte Esterase Urine Negative      Bacteria Urine Many (*)     Mucus Urine Present (*)     RBC Urine 1      WBC Urine 5     CK TOTAL - Abnormal    CK 23 (*)    URINE DRUG SCREEN PANEL - Abnormal    Amphetamines Urine Screen Positive (*)     Barbituates Urine Screen Negative      Benzodiazepine Urine Screen Negative      Cannabinoids Urine Screen Positive (*)     Cocaine Urine Screen Negative      Fentanyl Qual Urine Screen Negative      Opiates Urine Screen Negative      PCP Urine Screen Negative     INR - Normal    INR 0.99     PARTIAL THROMBOPLASTIN TIME - Normal    aPTT 29     LIPASE - Normal    Lipase 13     LACTIC ACID WHOLE BLOOD - Normal    Lactic Acid 0.9     MAGNESIUM - Normal    Magnesium 2.1     TSH WITH FREE T4 REFLEX - Normal    TSH 2.99     ETHYL ALCOHOL LEVEL - Normal    Alcohol ethyl <0.01     TROPONIN T, HIGH SENSITIVITY - Normal    Troponin T, High Sensitivity 13     CBC WITH PLATELETS AND DIFFERENTIAL    WBC Count 8.5      RBC Count 4.72      Hemoglobin 14.7      Hematocrit 42.2      MCV 89      MCH 31.1      MCHC 34.8      RDW 12.4      Platelet Count 311      % Neutrophils 55      % Lymphocytes 37      % Monocytes 8      % Eosinophils 0      % Basophils 0      % Immature Granulocytes 0      NRBCs per 100 WBC 0      Absolute Neutrophils 4.7      Absolute Lymphocytes 3.1      Absolute Monocytes 0.6      Absolute Eosinophils 0.0      Absolute Basophils 0.0      Absolute Immature Granulocytes 0.0      Absolute NRBCs 0.0     URINE CULTURE     CTA Head Neck w Contrast   Final Result   IMPRESSION:    HEAD CTA:     1.  No definite proximal large vessel occlusion identified.   2.  Severe short segment stenosis/narrowing of the distal right P2 posterior cerebral artery with faint opacification of the right P3/P4 posterior cerebral arteries beyond this finding, possibly related to a subocclusive thrombus.    3.  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 Follow-up Neuro Interventional Radiology consultation is recommended.   4.  Mild to moderate stenosis of the right cerebral clinoid ICA.   5.  Additional findings, as above.      NECK CTA:   1.  Atheromatous disease, as above. No high-grade stenosis or dissection.   2.  Low-density thyroid nodule measuring up to 2 cm. Recommend non-emergent thyroid function testing and thyroid ultrasound for further assessment.      Dr. Castro Leroy discussed results with Dr. Franklin Goins on 1/3/2024 2:08 AM CST.      Head CT w/o contrast   Final Result   IMPRESSION:   1.  New transcortical region of hypodensity identified involving the right occipital lobe with focal hypodensity involving the right thalamus. Findings are age-indeterminate, but concerning for a late acute to subacute ischemic injury within the right    PCA territory.    2.  Age-indeterminate bilateral inferior cerebellar lacunar-type infarctions, new since prior exam.   3.  Chronic left cerebellar infarct.   4.  No hemorrhage.               Dr. Castro Leroy discussed results with Dr. Franklin Goins on 01/03/2024 at 1:52 AM.      Humerus XR,  G/E 2 views, left   Final Result   IMPRESSION: Left humerus within normal limits. No fracture.      XR Wrist Left G/E 3 Views   Final Result   IMPRESSION: There is a nondisplaced oblique fracture through the distal left ulna. No dislocation.      XR Shoulder Left G/E 3 Views   Final Result   IMPRESSION: Normal left shoulder joint spaces and alignment. No left shoulder fracture. Presumed old mid left posterior rib fracture. Clinical correlation  recommended.      XR Chest 2 Views   Final Result   IMPRESSION: Negative chest.      MR Brain w/o & w Contrast    (Results Pending)   XR Wrist Port Left G/E 3 Views    (Results Pending)   XR Hand Port Left G/E 3 Views    (Results Pending)   XR Forearm Port Left 2 Views    (Results Pending)          Critical care was not performed.     Medical Decision Making  The patient's presentation was of high complexity (an acute health issue posing potential threat to life or bodily function).    The patient's evaluation involved:  ordering and/or review of 3+ test(s) in this encounter (see separate area of note for details)  discussion of management or test interpretation with another health professional (radiology, stroke neurology)    The patient's management necessitated high risk (a decision regarding hospitalization).    Assessment & Plan    Louann Lofton is a 63 year old female with PMH of ischemic cardiomyopathy, GERD s/p Nissen fundoplication (2009), asthma, alcohol abuse, tobacco use, depression/anxiety and PTSD who presents to the Emergency Department today due to fall on her left side.  Timeline of history is difficult to pin down exactly but it sounds like this occurred sometime this morning.  Patient further reports she has been having some issues with sensation of weakness on her left side for at least 1 week.  She feels like this is been getting worse over the last week.  Arrives to the emergency department well after the 4.5-hour thrombolytic window for acute CVA.  This remained on the differential.  Due to timeline of symptoms extending out to 1 week and relatively mild deficits on exam tier 2 stroke code was not activated and stroke workup with stroke consultation pursued.  Cardiogenic causes were considered as well.  On arrival she had reassuring vital signs.  Her neurologic exam is notable for sensory deficits and some slight motor deficits in the left lower extremity.    A broad medical workup looking  at potential infectious metabolic etiology was initiated.  EKG showing no acute ischemic changes.  This troponin slightly elevated at 15, down to 13 and repeat so ACS is unlikely.  Urinalysis suggest UTI so ceftriaxone was initiated for this.      Patient's blood counts are relatively unremarkable and reassuring.  Her metabolic panel shows mild hypokalemia that is likely not contributory.  No signs of rhabdomyolysis.  UDS was positive for amphetamines although patient only is endorsing use of cannabis. Question additional recreational drugs versus cross-reactivity.    I received a call from radiology regarding CT/CT head imaging.  There are chronic appearing ischemic changes as well as new transcortical hypodensity in the septal lobe that is age-indeterminate but concerning for late acute versus subacute which fits with the timeline of patient's symptoms.  No active bleeding was seen.  The vascular studies were notable for some stenosis and narrowing of the P2 posterior cerebral artery with opacification of the P3/P4 posterior arteries thought to be related to subocclusive thrombus.  There is also a finding of an A2/A3 anterior cerebral artery aneurysm that appeared to have no sign of rupture.    X-ray imaging shows a nondisplaced distal ulnar fracture.  Patient placed in a sandwich splint by myself.  Routine Ortho consult placed for follow-up and management recommendations during her hospitalization.  Service not paged overnight.  Imaging shows what appears to be an old left posterior rib fracture.  Patient with no significant rib pain or tenderness with anterior lateral compression.      Stroke neurology is evaluating the patient.  They would like to defer decision regarding antiplatelet therapy until an MRI of the brain is obtained.  I anticipate that we will be admitting the patient to their service after this.  Patient signed out to Dr. Hughes.      New Prescriptions    No medications on file       Final  diagnoses:   Acute CVA (cerebrovascular accident) (H)   Syncope and collapse   Closed fracture of distal end of left ulna, unspecified fracture morphology, initial encounter   Acute UTI   I, Syed Singh, am serving as a trained medical scribe to document services personally performed by Franklin Garcia Jr., MD based on the provider's statements to me on January 3, 2024.  This document has been checked and approved by the attending provider.    I, Franklin Garcia Jr., MD, was physically present and have reviewed and verified the accuracy of this note documented by Syed Singh, medical scribe.      Franklin Garcia Jr., MD   [facility] EMERGENCY DEPARTMENT  1/2/2024     Franklin Garcia MD  01/03/24 2529       Franklin Garcia MD  01/03/24 [id number]

## 2024-01-03 NOTE — PLAN OF CARE
Deferral - SLP orders received as part of stroke protocol. Chart reviewed and discussed with pt and RN Per pt, no acute changes in swallowing, speech, or language since admission. Pt tolerating a regular diet w/ no difficulties. SLP will defer evaluation at this time and complete orders, will page MD. Please reconsult SLP with changes in swallow function or communication skills.
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
## 2024-01-03 NOTE — PROGRESS NOTES
"ED PT Eval     01/03/24 1100   Appointment Info   Signing Clinician's Name / Credentials (PT) Vel Muñoz, PT, DPT   Rehab Comments (PT) Ortho discussed case with RN and MD but has not yet specified WB status. Remained NWB at L UE this session.   Living Environment   Living Environment Comments Patient currently staying at ex-'s trailer but is homeless and states that she will not return to her ex-husbands home due to feeling unsafe there.   Self-Care   Usual Activity Tolerance good   Current Activity Tolerance fair   Equipment Currently Used at Home none   Activity/Exercise/Self-Care Comment Patient IND at baseline but is difficult to obtain further history from, tangential at time of eval.   General Information   Onset of Illness/Injury or Date of Surgery 01/03/24   Referring Physician Aniyah Ayoub MD   Patient/Family Therapy Goals Statement (PT) To go to rehab   Pertinent History of Current Problem (include personal factors and/or comorbidities that impact the POC) Per EMR \"Louann Lofton is a 63 year old woman with history of HLD, tobacco use disorder, ischemic cardiomyopathy with LV thrombus for which she completed 3 months of warfarin, atrial fibrillation not on anticoagulation, migraine without aura, homelessness, Nissen fundlopication 2009 with chronic abdominal pain, and history of cognitive impairment? who presents with subacute L hemibody weakness with an acute fall, found to have subacute R PCA territory infarcts, possible UTI, and L distal non-displaced ulnar fracture.\"   Existing Precautions/Restrictions fall   Weight-Bearing Status - LUE other (see comments)  (assumed NWB this session due to ulnar fx)   General Observations activity: up with assist   Cognition   Cognitive Status Comments conversational but hyperalert and tangential   Range of Motion (ROM)   Range of Motion ROM is WFL   Strength (Manual Muscle Testing)   Strength (Manual Muscle Testing) strength is WFL   Bed Mobility   Bed " Mobility sit-supine;supine-sit   Supine-Sit [place] (Bed Mobility) contact guard   Sit-Supine [place] (Bed Mobility) contact guard   Transfers   Transfers sit-stand transfer   Sit-Stand Transfer   Sit-Stand [place] (Transfers) contact guard   Assistive Device (Sit-Stand Transfers) cane, straight   Gait/Stairs (Locomotion)   [place] Level (Gait) minimum assist (75% patient effort)   Assistive Device (Gait) cane, straight   Comment, (Gait/Stairs) patient with poor safety awareness and with slight later LOB with turns requirng min A.   Balance   Balance Comments IND sitting EOB, CGA in stance with SPC   Clinical Impression   Criteria for Skilled Therapeutic Intervention Yes, treatment indicated   PT Diagnosis (PT) impaired functional mobility   Influenced by the following impairments impaired functional balance, UE fx   Functional limitations due to impairments transfers, gait, stairs   Clinical Presentation (PT Evaluation Complexity) stable   Clinical Presentation Rationale clinical judgement   Clinical Decision Making (Complexity) low complexity   Planned Therapy Interventions (PT) balance training;bed mobility training;gait training;postural re-education;prosthetic fitting/training;ROM (range of motion);stair training;strengthening;stretching;transfer training   Risk & Benefits of therapy have been explained evaluation/treatment results reviewed;care plan/treatment goals reviewed;risks/benefits reviewed;current/potential barriers reviewed;participants voiced agreement with care plan;participants included;patient   PT Total Evaluation Time   PT Eval, Low Complexity Minutes (88527) 10   Physical Therapy Goals   PT Frequency 5x/week   PT Predicted Duration/Target Date for Goal Attainment 01/17/24   PT Goals Bed Mobility;Transfers;Gait;Stairs   PT: Bed Mobility Independent;Supine to/from sit;Within precautions   PT: Transfers Independent;Sit to/from stand;Within precautions   PT: Gait  Independent;Greater than 200 feet   PT: Stairs Independent;3 stairs   Interventions   Interventions Quick Adds Gait Training;Therapeutic Activity   PT Discharge Planning   PT Plan progress from SPC to unsupported, dynamic ambulatory balance, anticipate difficult placement   PT Discharge Recommendation (DC Rec) Transitional Care Facility   PT Rationale for DC Rec At this time recommend TCU for balance and gait deficits. That being said, suspect patient may be difficult placement and is not far from baseline. Will continue to progress in house with goal of progressing to OP needs pending length of hospital stay.   PT Brief overview of current status Ax1 for gait with cane and gait belt, impulsive   Total Session Time   Total Session Time (sum of timed and untimed services) 10       Vel Muñoz, PT, DPT  Pager #[phone]

## 2024-01-03 NOTE — CONSULTS
[facility]    Cardiology Consult Note-Cardiology      Date of Admission:  1/2/2024  Consult Requested by: Dr. Hutchins  Reason for Consult: LV thrombus, distal aneurysm - recs regarding management of thrombus, any evidence of endocarditis on echo?     Assessment & Plan: SBARBIE   Louann Lofton is a 63 year old female admitted on 1/2/2024. She has a history of  ischemic cardiomyopathy with LV thrombus for which she completed 3 months of warfarin, atrial fibrillation not on anticoagulation, migraine without aura, homelessness, HLD, methamphetamine use, tobacco use disorder, Nissen fundlopication 2009 with chronic abdominal pain, and history of cognitive impairment found to have subacute R PCA territory infarcts likely 2/2 large LV apical thrombus w/ LAD territory akinesis.     # LV apical thrombus w/ LAD territory akinesis  # Ischemic cardiomyopathy  # Large anterior MI 2/2006 s/p thrombolytics  Longstanding LV akinesis from STEMI in 2006 likely causing this large (3.8x1.8x1.2 cm) LV apical thrombus. Also share concern for hemorrhagic conversion but given the size of thrombus, she is at high risk of continued embolization and we agree with high intensity heparin. Left sided thrombus is not amenable to angiovac, so anticoagulation would be only treatment option. Initial concern for bacterial endocarditis given high risk - UDS positive for methamphetamine (denies use) - but her normal CRP, WBC, lactate, and normal vitals are all reassuring against this.  - Do not suspect bacterial endocarditis  - Agree with heparin, transition to and discharge on oral anticoagulation when stable from a neurologic standpoint   - High intensity statin  - Defer continuation of ASA for CAD in setting of recent stroke and heparin initiation to primary team  - Follow up with cardiology outpatient  - We will sign off, please feel free to re-consult if questions      The patient's care was  "discussed with the Attending Physician Dr. Apple, Fellow, and Patient.      Lis Pham MD  Family Medicine PGY2  [facility]    Clinically Significant Risk Factors Present on Admission        # Hypokalemia: Lowest K = 3.3 mmol/L in last 2 days, will replace as needed         # Drug Induced Platelet Defect: home medication list includes an antiplatelet medication   # Hypertension: Noted on problem list      # Cachexia: Estimated body mass index is 17.71 kg/m  as calculated from the following:    Height as of this encounter: 1.6 m (5' 3\").    Weight as of this encounter: 45.4 kg (100 lb).       # Asthma: noted on problem list     ______________________________________________________________________    Chief Complaint   Left sided weakness    History is obtained from the patient    History of Present Illness   Louann Lofton is a 63 year old female who has a history of ischemic cardiomyopathy 2/2 STEMI 2006 treated w/ thrombolysis, complicated by LV thrombus in 2007 for which she completed 3 months of warfarin, LV dilation and hypokenesis, atrial fibrillation not on anticoagulation, migraine without aura, homelessness, HLD, tobacco use disorder, Nissen fundlopication 2009 with chronic abdominal pain, and history of cognitive impairment who presents with subacute L hemibody weakness with an acute fall, found to have subacute R PCA territory infarcts, possible UTI, and L distal non-displaced ulnar fracture. Cardiology consulted for large LV thrombus.       Past Medical History    Past Medical History:   Diagnosis Date    Abdominal pain, left upper quadrant 10/21/2010    Acute myocardial infarction 4/23-4/26/11    Hospitalized @ [facility]    Alcohol abuse, unspecified 1997    Outpatient treatment at 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, ejection fraction 48%    LV thrombus 12/1/2006    Major depressive disorder, single episode, severe, without mention of psychotic behavior 2006    major depressive disorder    MI NOS EPISODE CARE UNSPEC 3/6/2006    Mitral valve disorders(424.0) 2/25/06    Hosp - apical ballooning syndrome and acute coronary syndrome    Palpitations     PARASITIC DISEASE NEC, 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   Constitutional:       Appearance: She is not diaphoretic.   Cardiovascular:      Rate and Rhythm: Normal rate and regular rhythm.      Pulses: Normal pulses.      Heart sounds: Normal heart sounds. No murmur heard.     No friction rub. No gallop.   Pulmonary:      Effort: Pulmonary effort is normal.      Breath sounds: Normal breath sounds.   Musculoskeletal:      Right lower leg: No edema.      Left lower leg: No edema.   Skin:     General: Skin is warm and dry.      Capillary Refill: Capillary refill takes less than 2 seconds.   Neurological:      Mental Status: She is alert.   Psychiatric:          Speech: Speech is tangential. Speech is not slurred.         Behavior: Behavior is hyperactive. Behavior is cooperative.         Cognition and Memory: Cognition and memory normal.             Data reviewed    Trop T: 14, 13    EKG: Dated 1/3/2024 reviewed personally.  Sinus rhythm with short OK, old inferior infarct, anteroseptal infarct changes consistent w/ location of large LV thrombus.     Echocardiogram w/ contrast 1/3/2024  Large 3.8x1.8x1.2 cm LV apical thrombus   LAD territory akinesis  LVEF 30%, decreased from prior    Atria  Both atria appear normal.     Mitral Valve  The mitral valve is normal. Mild mitral annular calcification is present. Mild  mitral insufficiency is present.     Aortic Valve  Aortic valve sclerosis is present.     Tricuspid Valve  The tricuspid valve is normal. Mild tricuspid insufficiency is present.  Pulmonary artery systolic pressure cannot be assessed.     Pulmonic Valve  The pulmonic valve is normal.     Pericardium  No pericardial effusion is present.    Coronary angiogram in 2007  CONCLUSION:   1.  Smooth 30-40% ostial LAD narrowing with a 30-50% mid-LAD   narrowing.   2. 25-30% mid RCA stenosis.   3. Decreased left ventricular function, estimated ejection fraction   of 50% with severe hypokinesia affecting the distal anterior wall   with the apex being frankly akinetic to dyskinetic.  No thrombus is   seen. No mitral regurgitation is seen.      Echocardiogram dated 1/28/21 from outside hospital  1. Moderately enlarged left ventricular chamber size.   2. Calculated 2-D biplane volumetric left ventricular ejection fraction 42 %.   3. Regional wall motion abnormalities were present (see wall motion graphics).   4. Normal right ventricular function.   5. Estimated right ventricular systolic pressure 38 mmHg.   6. No significant valvular heart disease.   7. Normal inferior vena cava size with normal inspiratory collapse (>50%).   8. Compared to the report of 07/06/2010 the  following changes have occurred: LV chamber size   has increased and wall motion abnormalities are more extensive (particularly at the mid   ventricular level).      Physician Attestation   I saw this patient with the resident and agree with the resident/fellow's findings and plan of care as documented in the note.      Key findings:   63-year-old woman with known ischemic cardiomyopathy from STEMI in 2006 in the LAD territory.  She does not receive regular cardiac care.  She is admitted for a subacute R PCA territory infarct with positive urine tox screen for methamphetamine and a large LV apical thrombus in the area of akinesis from prior LAD infarction.  CVA is likely from the LV thrombus. There are no obvious valvular vegetations on the echocardiogram done today.  Patient needs anticoagulation for the LV thormbus. For CAD - GDMT: aspirin 81 mg daily, statin therapy to lower LDL less than 70 mg/dL and a beta-blocker as needed to keep heart rate between 60 to 80 bpm.   Alannah Apple MD  Date of Service (when I saw the patient): January 3, 2024

## 2024-01-03 NOTE — CONSULTS
Discharge Pharmacy Test Claim    Eliquis and xarelto are covered with $0 copay [facility].    Test Claim Copay   eliquis 0.00   xarelto 0.00     Sally Abarca  [facility] Liaison  Ph: [phone]  Fax [phone]  Available on Vocera (learn more here)
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
## 2024-01-03 NOTE — CONSULTS
Orthopaedic Surgery Consultation/H&P    DATE OF SERVICE:  1/3/2024    This is a consultation requested by Franklin Garcia MD for distal ulna fracture.    CHIEF COMPLAINT:  left forearm/wrist pain    HISTORY OBTAINED FROM: patient    HISTORY:  This is a 63 year old old RHD female with PMH MI, alcohol abuse, asthma, HLD, CAD who presents with left forearm and wrist pain after a fall today. Patient was walking at home when the left side of her body went numb and she fell. Patient was unable to bear weight after the injury. Denies numbness, paresthesias, head trauma, LOC, pain anywhere else in the body. Denies history or injury or prior surgery to the left hand.         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    Acute coronary syndrome-[facility]    Coronary artery disease     Diaphragmatic hernia without mention of obstruction or gangrene 5/2/06    Dyslipidemia     Eczema herpeticum     Esophageal reflux     Folds in Descemet's membrane 10/9/2008    Hyperlipidemia     HYPERLIPIDEMIA NEC/NOS 9/25/2006    Irritable bowel syndrome     Ischemic cardiomyopathy     [place] 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, ejection fraction 48%    LV thrombus 12/1/2006    Major depressive disorder, single episode, severe, without mention of psychotic behavior 2006    major depressive disorder    MI NOS EPISODE CARE UNSPEC 3/6/2006    Mitral valve disorders(424.0) 2/25/06    Hosp - apical ballooning syndrome and acute coronary syndrome    Palpitations     PARASITIC DISEASE NEC, 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    Pure hypercholesterolemia     RECURR DEPR PSYCHOS-UNSP  3/20/2006    SEQUELAE BHAVIN INFRCT,OTHR NEC 11/29/2006    Tobacco abuse     Tobacco use disorder        PAST SURGICAL HISTORY:    Past Surgical History:   Procedure Laterality Date    HC ESOPHAGOSCOPY, DIAGNOSTIC  5/2/06    HC REMOVAL GALLBLADDER         FAMILY HISTORY:  Reviewed with patient and is non-contributory.   No personal or family history of bleeding or clotting disorders  No personal or family history of adverse reactions to anesthesia     SOCIAL HISTORY:   Tobacco -patient smokes half pack a day  Alcohol -denies alcohol  Occupation -is unemployed    ALLERGIES:   Allergies   Allergen Reactions    Neurontin [Gabapentin Enacarbil] Other (See Comments)     Pt was admitted for weakness at [facility] after starting Neurontin.    Aleve [Naproxen Sodium] Hives and Swelling     lips swelled    Nsaids      Noted in 5/18/08 ER    Paxil [Paroxetine]      rash    Ultram [Tramadol Hcl] Swelling       MEDS:   Blood Thinners:   Prior to Admission medications    Medication Sig Last Dose Taking? Auth Provider Long Term End Date   amitriptyline (ELAVIL) 10 MG tablet Start at 1 tab at bedtime for 3 days, then 2 tabs at bedtime for 3 days, then 3 tabs at bedtime.  Plan to increase dose to 50-75 mg at bedtime after 1 month  Patient not taking: Reported on 3/3/2023   Smauel Guillermo MD Yes    aspirin (ASA) 81 MG chewable tablet Take 1 tablet (81 mg) by mouth daily   Rose Fierro MD     aspirin 325 MG EC tablet Take 325 mg by mouth daily.  Patient not taking: Reported on 3/3/2023   Reported, Patient     cefpodoxime (VANTIN) 200 MG tablet Take 1 tablet (200 mg) by mouth 2 times daily   Charlotte Cedillo MD     dicyclomine (BENTYL) 10 MG capsule Take 1 capsule (10 mg) by mouth 4 times daily (before meals and nightly)  Patient not taking: Reported on 3/3/2023   Samuel Guillermo MD     levalbuterol (XOPENEX HFA) 45 MCG/ACT inhaler Inhale 2 puffs into the lungs every 4 hours as needed for shortness of breath /  dyspnea  Patient not taking: Reported on 3/3/2023   Teetee Varghese MD Yes    metoprolol (TOPROL XL) 50 MG 24 hr tablet Take 1 tablet (50 mg) by mouth daily  Patient not taking: Reported on 3/3/2023   Samuel Guillermo MD Yes    metoprolol succinate ER (TOPROL XL) 25 MG 24 hr tablet Take 0.5 tablets (12.5 mg) by mouth daily   Rose Fierro MD Yes    nitroglycerin (NITROSTAT) 0.4 MG SL tablet Place 1 tablet under the tongue every 5 minutes as needed. Up to 3 dose- then call 911 if continues  Patient not taking: Reported on 3/3/2023   Linsey Villareal PA-C Yes    omeprazole 20 MG tablet Take 1 tablet (20 mg) by mouth daily Take 30-60 minutes before a meal.  Patient not taking: Reported on 3/3/2023   Teetee Varghese MD     promethazine (PHENERGAN) 25 MG tablet Take 1 tablet by mouth every 6 hours as needed for nausea.  Patient not taking: Reported on 3/3/2023   Linsey Villareal PA-C Yes    valsartan (DIOVAN) 160 MG tablet Take 1 tablet by mouth daily.  Patient not taking: Reported on 3/3/2023   Juan Carlos Campos MD           REVIEW OF SYSTEMS:  An 11-point systems review was performed and negative except as noted in the HPI.      PHYSICAL EXAMINATION:    Vitals:    01/03/24 0529 01/03/24 0646 01/03/24 0827 01/03/24 1127   BP: 139/66  121/63    BP Location:   Left arm    Pulse: 66 65 62 72   Resp: 20 19 14 11   Temp:   98  F (36.7  C)    TempSrc:   Oral    SpO2: 99% 98% 99% 100%   Weight:       Height:             Gen:  Awake and Alert, pleasant, interactive, appears comfortable.    Psych:  Articulates and Communicates with a normal affect    HEENT:  Normal and atraumatic    Pulm:   Non-labored breathing at rest. Saturating well on RA.   CV:  RRR   Extremities:    RUE:   No deformity, skin intact.   Non-tender to palpation over clavicle, shoulder, arm, elbow, arm, wrist, snuffbox, fingers  Normal ROM shoulder, elbow, wrist without pain. +FPL/EPL/FDS/FDP/IO/lumbricals.     SILT over m/r/u distributions.      Radial pulse palpable.     LUE:  No deformity, skin intact.   Non-tender to palpation over clavicle, shoulder, arm, elbow.  TTP over distal forearm and wrist.   Normal ROM shoulder without pain.  Minimal pain with range of motion at elbow, wrist due to pain. + FPL/EPL/intrinsics.   Paresthesias over m/r/u distributions, however may be related to left-sided numbness and tingling over her entire body due to neurological process.     Radial pulse palpable.       LABS/IMAGING:  Recent Labs   Lab Test 01/02/24  2356 04/19/23  0906   HGB 14.7 16.1*   WBC 8.5 11.2*     Recent Labs   Lab Test 01/02/24  2356 04/19/23  1227 04/19/23  0906   CHLORIDE 107 107 104   CO2 27 17* 19*   BUN 10.9 20.7 22.3     Recent Labs   Lab Test 01/02/24  2356 12/07/20  1550   INR 0.99 1   PTT 29  --        Imaging:  Review of x-rays shows nondisplaced left ulnar fracture.      IMPRESSION AND PLAN:  A 63 year old old RHD female with left oblique ulnar fracture.  Closed reduction and sugartong splinting performed.  Risks, benefits and alternatives of both operative and closed treatment were discussed. Questions answered.    - Plan for OR: None at this time  - Anticoagulation: none from orthopaedic perspective  - Antibiotics/tetanus: none from orthopaedic perspective  - Xrays/imaging: postreduction films pending pending  - Activity: AAT, finger ROM encouraged  - Weight Bearing: NWB left upper extremity  - Pain control: per ED  - Diet: regular diet from orthopaedic perspective  - Dispo: okay to discharge from orthopaedic perspective  - Follow-up: in clinic in 1 week with Dr. Fountain with repeat AP/lat XR of left forearm and wrist    Patient was discussed with Orthopaedic staff, Dr. Fountain.    Shruti Guan MD  Orthopaedic Surgery, PGY-1  [phone]

## 2024-01-03 NOTE — PLAN OF CARE
Goal Outcome Evaluation:      Plan of Care Reviewed With: patient      Patient will discharge to her son's place or the shelter.   She might need transportation as well.   Charity Villaseñor RN, PHN, BSN   Float Nurse Care Coordinator  Covering for Unit Ed  Phone [phone]

## 2024-01-04 ENCOUNTER — APPOINTMENT (OUTPATIENT)
Dept: CT IMAGING | Facility: CLINIC | Age: 64
DRG: 065 | End: 2024-01-04
Payer: MEDICARE

## 2024-01-04 ENCOUNTER — APPOINTMENT (OUTPATIENT)
Dept: PHYSICAL THERAPY | Facility: CLINIC | Age: 64
DRG: 065 | End: 2024-01-04
Payer: MEDICARE

## 2024-01-04 LAB
ANION GAP SERPL CALCULATED.3IONS-SCNC: 8 MMOL/L (ref 7–15)
BACTERIA UR CULT: ABNORMAL
BUN SERPL-MCNC: 8.2 MG/DL (ref 8–23)
CALCIUM SERPL-MCNC: 8.6 MG/DL (ref 8.8–10.2)
CHLORIDE SERPL-SCNC: 106 MMOL/L (ref 98–107)
CREAT SERPL-MCNC: 0.63 MG/DL (ref 0.51–0.95)
DEPRECATED HCO3 PLAS-SCNC: 27 MMOL/L (ref 22–29)
EGFRCR SERPLBLD CKD-EPI 2021: >90 ML/MIN/1.73M2
ERYTHROCYTE [DISTWIDTH] IN BLOOD BY AUTOMATED COUNT: 12.2 % (ref 10–15)
GLUCOSE BLDC GLUCOMTR-MCNC: 106 MG/DL (ref 70–99)
GLUCOSE BLDC GLUCOMTR-MCNC: 109 MG/DL (ref 70–99)
GLUCOSE BLDC GLUCOMTR-MCNC: 113 MG/DL (ref 70–99)
GLUCOSE BLDC GLUCOMTR-MCNC: 121 MG/DL (ref 70–99)
GLUCOSE SERPL-MCNC: 93 MG/DL (ref 70–99)
HCT VFR BLD AUTO: 42.2 % (ref 35–47)
HGB BLD-MCNC: 14.3 G/DL (ref 11.7–15.7)
MCH RBC QN AUTO: 31 PG (ref 26.5–33)
MCHC RBC AUTO-ENTMCNC: 33.9 G/DL (ref 31.5–36.5)
MCV RBC AUTO: 92 FL (ref 78–100)
PLATELET # BLD AUTO: 315 10E3/UL (ref 150–450)
POTASSIUM SERPL-SCNC: 4.2 MMOL/L (ref 3.4–5.3)
RBC # BLD AUTO: 4.61 10E6/UL (ref 3.8–5.2)
SODIUM SERPL-SCNC: 141 MMOL/L (ref 135–145)
UFH PPP CHRO-ACNC: 0.45 IU/ML
UFH PPP CHRO-ACNC: 0.47 IU/ML
UFH PPP CHRO-ACNC: 0.76 IU/ML
WBC # BLD AUTO: 7 10E3/UL (ref 4–11)

## 2024-01-04 PROCEDURE — 70450 CT HEAD/BRAIN W/O DYE: CPT | Mod: MG

## 2024-01-04 PROCEDURE — 120N000002 HC R&B MED SURG/OB UMMC

## 2024-01-04 PROCEDURE — 99232 SBSQ HOSP IP/OBS MODERATE 35: CPT | Mod: GC | Performed by: STUDENT IN AN ORGANIZED HEALTH CARE EDUCATION/TRAINING PROGRAM

## 2024-01-04 PROCEDURE — 36415 COLL VENOUS BLD VENIPUNCTURE: CPT

## 2024-01-04 PROCEDURE — 250N000011 HC RX IP 250 OP 636

## 2024-01-04 PROCEDURE — 999N000127 HC STATISTIC PERIPHERAL IV START W US GUIDANCE

## 2024-01-04 PROCEDURE — 82962 GLUCOSE BLOOD TEST: CPT

## 2024-01-04 PROCEDURE — 97530 THERAPEUTIC ACTIVITIES: CPT | Mod: GP

## 2024-01-04 PROCEDURE — 36415 COLL VENOUS BLD VENIPUNCTURE: CPT | Performed by: STUDENT IN AN ORGANIZED HEALTH CARE EDUCATION/TRAINING PROGRAM

## 2024-01-04 PROCEDURE — 80048 BASIC METABOLIC PNL TOTAL CA: CPT

## 2024-01-04 PROCEDURE — 70450 CT HEAD/BRAIN W/O DYE: CPT | Mod: 26 | Performed by: RADIOLOGY

## 2024-01-04 PROCEDURE — G1010 CDSM STANSON: HCPCS | Performed by: RADIOLOGY

## 2024-01-04 PROCEDURE — 250N000013 HC RX MED GY IP 250 OP 250 PS 637

## 2024-01-04 PROCEDURE — 85520 HEPARIN ASSAY: CPT | Performed by: STUDENT IN AN ORGANIZED HEALTH CARE EDUCATION/TRAINING PROGRAM

## 2024-01-04 PROCEDURE — 85027 COMPLETE CBC AUTOMATED: CPT

## 2024-01-04 PROCEDURE — 97116 GAIT TRAINING THERAPY: CPT | Mod: GP

## 2024-01-04 RX ORDER — ROSUVASTATIN CALCIUM 20 MG/1
20 TABLET, COATED ORAL DAILY
Status: DISCONTINUED | OUTPATIENT
Start: 2024-01-04 | End: 2024-01-12 | Stop reason: HOSPADM

## 2024-01-04 RX ORDER — MAGNESIUM SULFATE HEPTAHYDRATE 40 MG/ML
2 INJECTION, SOLUTION INTRAVENOUS ONCE
Status: COMPLETED | OUTPATIENT
Start: 2024-01-04 | End: 2024-01-04

## 2024-01-04 RX ORDER — OXYCODONE HCL 5 MG/5 ML
5 SOLUTION, ORAL ORAL EVERY 6 HOURS PRN
Status: DISCONTINUED | OUTPATIENT
Start: 2024-01-04 | End: 2024-01-05

## 2024-01-04 RX ORDER — OXYCODONE HYDROCHLORIDE 5 MG/1
5 TABLET ORAL EVERY 6 HOURS PRN
Status: DISCONTINUED | OUTPATIENT
Start: 2024-01-04 | End: 2024-01-04

## 2024-01-04 RX ORDER — DIPHENHYDRAMINE HYDROCHLORIDE 50 MG/ML
25 INJECTION INTRAMUSCULAR; INTRAVENOUS EVERY 6 HOURS PRN
Status: DISCONTINUED | OUTPATIENT
Start: 2024-01-04 | End: 2024-01-11

## 2024-01-04 RX ORDER — ATORVASTATIN CALCIUM 40 MG/1
40 TABLET, FILM COATED ORAL EVERY EVENING
Status: DISCONTINUED | OUTPATIENT
Start: 2024-01-04 | End: 2024-01-04

## 2024-01-04 RX ADMIN — MAGNESIUM SULFATE IN WATER 2 G: 40 INJECTION, SOLUTION INTRAVENOUS at 13:36

## 2024-01-04 RX ADMIN — OXYCODONE HYDROCHLORIDE 5 MG: 5 TABLET ORAL at 13:00

## 2024-01-04 RX ADMIN — HEPARIN SODIUM AND DEXTROSE 1100 UNITS/HR: 10000; 5 INJECTION INTRAVENOUS at 11:13

## 2024-01-04 RX ADMIN — Medication 12.5 MG: at 12:23

## 2024-01-04 RX ADMIN — ACETAMINOPHEN 650 MG: 325 TABLET, FILM COATED ORAL at 14:36

## 2024-01-04 RX ADMIN — Medication 12.5 MG: at 21:26

## 2024-01-04 RX ADMIN — ACETAMINOPHEN 650 MG: 325 TABLET, FILM COATED ORAL at 07:56

## 2024-01-04 RX ADMIN — CEFTRIAXONE SODIUM 1 G: 1 INJECTION, POWDER, FOR SOLUTION INTRAMUSCULAR; INTRAVENOUS at 02:04

## 2024-01-04 RX ADMIN — ROSUVASTATIN CALCIUM 20 MG: 20 TABLET, FILM COATED ORAL at 13:35

## 2024-01-04 RX ADMIN — ACETAMINOPHEN 650 MG: 325 TABLET, FILM COATED ORAL at 02:09

## 2024-01-04 RX ADMIN — NICOTINE 1 PATCH: 14 PATCH, EXTENDED RELEASE TRANSDERMAL at 07:58

## 2024-01-04 RX ADMIN — OXYCODONE HYDROCHLORIDE 5 MG: 5 SOLUTION ORAL at 19:29

## 2024-01-04 RX ADMIN — ASPIRIN 81 MG CHEWABLE TABLET 81 MG: 81 TABLET CHEWABLE at 07:56

## 2024-01-04 RX ADMIN — ACETAMINOPHEN 650 MG: 325 TABLET, FILM COATED ORAL at 18:18

## 2024-01-04 ASSESSMENT — ACTIVITIES OF DAILY LIVING (ADL)
ADLS_ACUITY_SCORE: 42

## 2024-01-04 NOTE — CONSULTS
Discharge Pharmacy Test Claim    Atorvastatin and rosuvastatin are covered with $0 copay [facility]. Patient has medicaid and a low income subsidy thus all copays for formulary medications are $0.    Test Claim Copay   atorvastatin 0.00   rosuvastatin 0.00     Sally Abarca  [facility] Pharmacy Liaison  Ph: [phone]  Fax [phone]  Available on [employer] (learn more here)

## 2024-01-04 NOTE — CONSULTS
Health Psychology          Meera Lawrence, Ph.D.,  [phone]  Carey Ashton, Ph.D.,  [phone]  Cuca Irving, Ph.D.,  [phone]  Krystal Diaz, Ph.D., , [facility] [phone]  Franklin Gibbs, Ph.D., , [facility] [phone]  Tari Vega, Ph.D.,  [phone]  Carmen Stone, Ph.D., , [facility] [phone]    [facility], 3rd Floor  [address] [place] [address]       Inpatient Health Psychology Consultation     Date of Service: 01/04/24     Received page from Yousif on stroke team notifying health psychology team of consult request. Message states patient comes from difficult social circumstances and is feeling overwhelmed. Health psychology will attempt to complete consult at next available time.      Carmen Stone, Ph.D., , [facility]  Clinical Health Psychologist  Phone: [phone]   Pager: [phone]  1/4/2024 3:04 PM

## 2024-01-04 NOTE — PHARMACY-CONSULT NOTE
Pharmacy Consult to evaluate for medication related stroke core measures    Luoann Lofton, 63 year old female admitted for L sided weakness and sensory loss on 1/2/2024.    Thrombolytic was not given because of Time from onset contraindications    VTE Prophylaxis  N/A - on treatment dose anticoagulation    Antithrombotic: aspirin started on 1/3, as appropriate by end of hospital day 2. Continue antithrombotic therapy on discharge to meet quality measures, unless contraindicated.    Anticoagulation: High-intensity heparin started per neuro as stroke likely cardioembolic     LDL Cholesterol Calculated   Date Value Ref Range Status   01/03/2024 109 (H) <=100 mg/dL Final   07/29/2011 171 (H) 0 - 129 mg/dL Final     Comment:     LDL Cholesterol is the primary guide to therapy: LDL-cholesterol goal in high   risk patients is <100 mg/dL and in very high risk patients is <70 mg/dL.       Patient currently receiving Lipitor (atorvastatin) continue statin on discharge to meet quality measures, unless contraindicated.    Recommendations: None at this time    Thank you for the consult.    Martha Pfeiffer [facility] 1/4/2024 1:21 PM

## 2024-01-04 NOTE — PROGRESS NOTES
[facility]    Stroke Progress Note    Interval Events  Nursing notes reviewed, no acute events overnight. Slept well. Mild headache this morning, did improve initially with Tylenol. Pain in left arm increasing. Appetite good. Still having numbness on left side of face. Discussed trying statin medications and patient agreeable.    HPI Summary  Louann Lofton is a 63 year old woman with history of HLD, tobacco use disorder, ischemic cardiomyopathy with LV thrombus for which she completed 3 months of warfarin, atrial fibrillation not on anticoagulation, migraine without aura, homelessness, Nissen fundlopication 2009 with chronic abdominal pain, and history of cognitive impairment? who presents with subacute L hemibody weakness with an acute fall, found to have subacute R PCA territory infarcts, possible UTI, and L distal non-displaced ulnar fracture.     Stroke Evaluation Summarized    MRI/Head CT CT Head   IMPRESSION:  1.  New transcortical region of hypodensity identified involving the right occipital lobe with focal hypodensity involving the right thalamus. Findings are age-indeterminate, but concerning for a late acute to subacute ischemic injury within the right   PCA territory.   2.  Age-indeterminate bilateral inferior cerebellar lacunar-type infarctions, new since prior exam.  3.  Chronic left cerebellar infarct.  4.  No hemorrhage.     MRI Brain with and without contrast      IMPRESSION:  1.  Late acute versus subacute right PCA territory infarcts, as above. No hemorrhagic conversion.  2.  Chronic senescent and ischemic changes, as above.   Intracranial Vasculature IMPRESSION:   HEAD CTA:   1.  No definite proximal large vessel occlusion identified.  2.  Severe short segment stenosis/narrowing of the distal right P2 posterior cerebral artery with faint opacification of the right P3/P4 posterior cerebral arteries beyond this finding, possibly related to a  subocclusive thrombus.   3.  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 Follow-up Neuro Interventional Radiology consultation is recommended.  4.  Mild to moderate stenosis of the right cerebral clinoid ICA.     Cervical Vasculature NECK CTA:  1.  Atheromatous disease, as above. No high-grade stenosis or dissection.  2.  Low-density thyroid nodule measuring up to 2 cm. Recommend non-emergent thyroid function testing and thyroid ultrasound for further assessment.      Echocardiogram Large 3.8x1.8x1.2 cm LV apical thrombus noted.  LAD territory akinesis.  Multiple attempts made to contact the team with no success.   EKG/Telemetry Sinus rhythm with short AL interval, R atrial enlargement possible inferior infarct    Other Testing Not Applicable      LDL  1/3/2024: 109 mg/dL   A1C  1/2/2024: 5.9 %   Troponin 1/3/2024: 13 ng/L       Impression   Louann Lofton is a 63 year old woman with history of HLD, polysubstance use disorder, ischemic cardiomyopathy with LV thrombus, atrial fibrillation not on anticoagulation, migraine without aura, homelessness, and history of cognitive impairment? who presents with subacute L hemibody weakness and sensory loss with recent fall, found to have subacute R PCA territory infarcts, possible UTI, and L distal non-displaced ulnar fracture. Urine drug screen positive for amphetamines and cannabinoids. Initial NIHSS 6 in setting of L hemibody weakness and sensory loss with extinction, as well as LUQ homonymous quadrantanopia.     CT/CTA with evidence of subacute R PCA territory infarcts, concern for severe stenosis v.s. subocclusive thrombus of the R P2, and incidental L A2/3 4mm aneurysm. MRI confirms subacute infarction in right PCA territory. TTE revealed large LV thrombus and newly decreased EF. Not a candidate for thrombolysis given outside the time window and established infarctions on imaging.  Not a candidate for thrombectomy given no proximal large vessel  occlusion and subacute presentation of symptoms.    Mechanism of stroke cardioembolic from LV thrombus. Given newly reduced EF, is possible that she had another MI leading to stagnation of blood and ultimately thrombus formation. Started on heparin gtt, now therapeutic. Would ideally bridge to Warfarin, but have concerns about compliance and outpatient follow up for INR monitoring. Will plan to discuss with cardiology team on possibility of treating with DOAC.     Plan  #Subacute R PCA territory infarcts, suspected etiology: cardioembolism in setting of a-fib not on anticoagulation  #Severe stenosis v.s. subocclusive thrombus of R P2  #Incidental L A2/3 4mm aneurysm  - Admit to Neurology  - Neurochecks Q 4 hours  - BP goal normotension, anti-hypertensives for SBP > 180  - Avoid hypotonic IV fluids  - Telemetry, EKG  - Bedside Glucose Monitoring  - PT/OT/SLP  - Stroke Education  - Smoking Cessation  - Depression Screen  - Apnea Screen  - Heparin gtt w/ tentative plan to transition to DOAC pending cardiology recommendations  - ASA 81 mg daily  - Crestor 20 mg  - Outpatient CTA head and neck in 1 year to assess for aneurysm stability      #Demand ischemia   #Hx HTN   #Hx a-fib  #HLD  Troponin leak likely secondary to amphetamine use v.s. pain from L ulnar fracture v.s. subacute stroke.Troponin peaked at 15.  -Pharmacy med rec consult   -PTA valsartan 160 mg daily  -Heparin gtt   -Crestor 20 mg  -Metoprolol tartrate 12.5 mg BID     #Non-displaced distal L ulnar fracture   - Oxycodone 5 mg q6hr prn for pain  - Ortho consult, appreciate recommendations   - Re-splinted  - Follow up in clinic in one week for repeat A/P and lateral x-rays   - Non-weight bearing LUE     #UTI   Not symptomatic. Urine culture growing E. Coli.  - CTX 1 g qday x3 days (last dose 1/5/24)    #Headache  - Magnesium 2 g IV once  - Compazine 10 mg q6 hr prn  - Benadryl 25 mg q6 hr prn     #Amphetamine use   -Declines use, declines addiction medicine  "consult     #Hx tobacco use   -Nicotine patch      #Homelessness   -Social work consult      Prophylaxis            For VTE Prevention:  - heparin SQ     For Acid Suppression:  - None    Patient Follow-up    - final recommendation pending work-up    The patient was discussed with Stroke Fellow, Dr. Hutchins.     Yousif Puente MD  Neurology Resident  Ascom: #[id number]  ______________________________________________________    Clinically Significant Risk Factors        # Hypokalemia: Lowest K = 3.3 mmol/L in last 2 days, will replace as needed           # Hypertension: Noted on problem list        # Cachexia: Estimated body mass index is 17.71 kg/m  as calculated from the following:    Height as of this encounter: 1.6 m (5' 3\").    Weight as of this encounter: 45.4 kg (100 lb)., PRESENT ON ADMISSION     # Asthma: noted on problem list          Medications   Scheduled Meds   aspirin  81 mg Oral Daily    cefTRIAXone  1 g Intravenous Q24H    insulin aspart  1-7 Units Subcutaneous TID AC    insulin aspart  1-5 Units Subcutaneous At Bedtime    magnesium sulfate  2 g Intravenous Once    metoprolol tartrate  12.5 mg Oral BID    nicotine  1 patch Transdermal Daily    nicotine   Transdermal Q8H    rosuvastatin  20 mg Oral Daily    sodium chloride (PF)  3 mL Intracatheter Q8H       Infusion Meds   heparin 1,100 Units/hr (01/04/24 1402)    - MEDICATION INSTRUCTIONS -         PRN Meds  acetaminophen, glucose **OR** dextrose **OR** glucagon, diphenhydrAMINE, labetalol **OR** hydrALAZINE, lidocaine 4%, lidocaine (buffered or not buffered), - MEDICATION INSTRUCTIONS -, ondansetron **OR** ondansetron, oxyCODONE, prochlorperazine, prochlorperazine **OR** prochlorperazine **OR** prochlorperazine, sodium chloride (PF)       PHYSICAL EXAMINATION  Temp:  [97.7  F (36.5  C)-98  F (36.7  C)] 97.7  F (36.5  C)  Pulse:  [63-76] 66  Resp:  [16] 16  BP: (131-153)/(69-97) 145/76  SpO2:  [96 %-99 %] 96 %      Neurologic  Mental Status:  alert, " oriented x 3, follows commands, speech clear and fluent, naming and repetition normal  Cranial Nerves:  PERRL, EOMI with normal smooth pursuit, facial movements symmetric, hearing not formally tested but intact to conversation, palate elevation symmetric and uvula midline, no dysarthria, shoulder shrug strong bilaterally, tongue protrusion midline, LUQ visual field cut, decreased sensation to light touch in V1-V3 distribution on left face  Motor:  normal muscle tone and bulk, no abnormal movements, able to move all limbs spontaneously, no pronator drift, strength 5/5 with shoulder abduction bilaterally, elbow flexion/extension 5/5 on right, 4/5 on left but limited due to pain, hip flexion 5/5 bilaterally, knee flexion 4/5 bilaterally, dorsiflexion 5/5 bilaterally  Reflexes:  no clonus, toes down-going  Sensory:  no extinction on double simultaneous stimulation , decreased sensation to light touch in LUE and LLE, intact in RUE and RLE  Coordination:  normal finger-to-nose and heel-to-shin bilaterally without dysmetria  Station/Gait:  deferred    Stroke Scales    NIHSS  1a. Level of Consciousness 0-->Alert, keenly responsive   1b. LOC Questions 0-->Answers both questions correctly   1c. LOC Commands 0-->Performs both tasks correctly   2.   Best Gaze 0-->Normal   3.   Visual 1-->Partial hemianopia   4.   Facial Palsy 0-->Normal symmetrical movements   5a. Motor Arm, Left 0-->No drift, limb holds 90 (or 45) degrees for full 10 secs   5b. Motor Arm, Right 0-->No drift, limb holds 90 (or 45) degrees for full 10 secs   6a. Motor Leg, Left 0-->No drift, leg holds 30 degree position for full 5 secs   6b. Motor Leg, right 0-->No drift, leg holds 30 degree position for full 5 secs   7.   Limb Ataxia 0-->Absent   8.   Sensory 1-->Mild-to-moderate sensory loss, patient feels pinprick is less sharp or is dull on the affected side, or there is a loss of superficial pain with pinprick, but patient is aware of being touched   9.    Best Language 0-->No aphasia, normal   10. Dysarthria 0-->Normal   11. Extinction and Inattention  0-->No abnormality   Total 2 (01/04/24 1430)       Imaging  I personally reviewed all imaging; relevant findings per HPI.     Lab Results Data   CBC  Recent Labs   Lab 01/04/24  0820 01/03/24  1350 01/02/24  2356   WBC 7.0 6.4 8.5   RBC 4.61 4.39 4.72   HGB 14.3 13.7 14.7   HCT 42.2 39.4 42.2    289 311     Basic Metabolic Panel    Recent Labs   Lab 01/04/24  1133 01/04/24  0820 01/04/24  0801 01/03/24  1152 01/02/24  2356   NA  --  141  --   --  143   POTASSIUM  --  4.2  --   --  3.3*   CHLORIDE  --  106  --   --  107   CO2  --  27  --   --  27   BUN  --  8.2  --   --  10.9   CR  --  0.63  --   --  0.65   * 93 106*   < > 105*   PB  --  8.6*  --   --  8.7*    < > = values in this interval not displayed.     Liver Panel  Recent Labs   Lab 01/02/24 2356   PROTTOTAL 6.4   ALBUMIN 3.6   BILITOTAL 0.3   ALKPHOS 69   AST 18   ALT 14     INR    Recent Labs   Lab Test 01/02/24 2356 12/07/20  1550   INR 0.99 1      Lipid Profile    Recent Labs   Lab Test 01/03/24  0213 12/10/20  1113   CHOL 171  --    HDL 46*  --    *  --    TRIG 80 83     A1C    Recent Labs   Lab Test 01/02/24 2356   A1C 5.9*     Troponin    Recent Labs   Lab 01/03/24 0213 01/02/24 2356   CTROPT 13 14  15*          Data

## 2024-01-05 ENCOUNTER — APPOINTMENT (OUTPATIENT)
Dept: GENERAL RADIOLOGY | Facility: CLINIC | Age: 64
DRG: 065 | End: 2024-01-05
Payer: MEDICARE

## 2024-01-05 ENCOUNTER — APPOINTMENT (OUTPATIENT)
Dept: PHYSICAL THERAPY | Facility: CLINIC | Age: 64
DRG: 065 | End: 2024-01-05
Payer: MEDICARE

## 2024-01-05 LAB
ANION GAP SERPL CALCULATED.3IONS-SCNC: 10 MMOL/L (ref 7–15)
BUN SERPL-MCNC: 8.8 MG/DL (ref 8–23)
CALCIUM SERPL-MCNC: 8.5 MG/DL (ref 8.8–10.2)
CHLORIDE SERPL-SCNC: 106 MMOL/L (ref 98–107)
CREAT SERPL-MCNC: 0.62 MG/DL (ref 0.51–0.95)
DEPRECATED HCO3 PLAS-SCNC: 24 MMOL/L (ref 22–29)
EGFRCR SERPLBLD CKD-EPI 2021: >90 ML/MIN/1.73M2
ERYTHROCYTE [DISTWIDTH] IN BLOOD BY AUTOMATED COUNT: 12.2 % (ref 10–15)
GLUCOSE BLDC GLUCOMTR-MCNC: 106 MG/DL (ref 70–99)
GLUCOSE BLDC GLUCOMTR-MCNC: 126 MG/DL (ref 70–99)
GLUCOSE BLDC GLUCOMTR-MCNC: 134 MG/DL (ref 70–99)
GLUCOSE BLDC GLUCOMTR-MCNC: 159 MG/DL (ref 70–99)
GLUCOSE BLDC GLUCOMTR-MCNC: 89 MG/DL (ref 70–99)
GLUCOSE SERPL-MCNC: 85 MG/DL (ref 70–99)
HCT VFR BLD AUTO: 41.5 % (ref 35–47)
HGB BLD-MCNC: 13.7 G/DL (ref 11.7–15.7)
MAGNESIUM SERPL-MCNC: 2.1 MG/DL (ref 1.7–2.3)
MCH RBC QN AUTO: 30.9 PG (ref 26.5–33)
MCHC RBC AUTO-ENTMCNC: 33 G/DL (ref 31.5–36.5)
MCV RBC AUTO: 94 FL (ref 78–100)
PHOSPHATE SERPL-MCNC: 4.4 MG/DL (ref 2.5–4.5)
PLATELET # BLD AUTO: 311 10E3/UL (ref 150–450)
POTASSIUM SERPL-SCNC: 4 MMOL/L (ref 3.4–5.3)
RBC # BLD AUTO: 4.44 10E6/UL (ref 3.8–5.2)
SODIUM SERPL-SCNC: 140 MMOL/L (ref 135–145)
TROPONIN T SERPL HS-MCNC: 10 NG/L
TROPONIN T SERPL HS-MCNC: 11 NG/L
UFH PPP CHRO-ACNC: 0.67 IU/ML
WBC # BLD AUTO: 6.3 10E3/UL (ref 4–11)

## 2024-01-05 PROCEDURE — 85520 HEPARIN ASSAY: CPT | Performed by: STUDENT IN AN ORGANIZED HEALTH CARE EDUCATION/TRAINING PROGRAM

## 2024-01-05 PROCEDURE — 250N000011 HC RX IP 250 OP 636

## 2024-01-05 PROCEDURE — 250N000013 HC RX MED GY IP 250 OP 250 PS 637

## 2024-01-05 PROCEDURE — 84484 ASSAY OF TROPONIN QUANT: CPT

## 2024-01-05 PROCEDURE — 82962 GLUCOSE BLOOD TEST: CPT

## 2024-01-05 PROCEDURE — 83735 ASSAY OF MAGNESIUM: CPT

## 2024-01-05 PROCEDURE — 97116 GAIT TRAINING THERAPY: CPT | Mod: GP

## 2024-01-05 PROCEDURE — 71045 X-RAY EXAM CHEST 1 VIEW: CPT

## 2024-01-05 PROCEDURE — 36415 COLL VENOUS BLD VENIPUNCTURE: CPT

## 2024-01-05 PROCEDURE — 120N000002 HC R&B MED SURG/OB UMMC

## 2024-01-05 PROCEDURE — 80048 BASIC METABOLIC PNL TOTAL CA: CPT

## 2024-01-05 PROCEDURE — 97530 THERAPEUTIC ACTIVITIES: CPT | Mod: GP

## 2024-01-05 PROCEDURE — 85027 COMPLETE CBC AUTOMATED: CPT

## 2024-01-05 PROCEDURE — 99232 SBSQ HOSP IP/OBS MODERATE 35: CPT | Mod: GC | Performed by: STUDENT IN AN ORGANIZED HEALTH CARE EDUCATION/TRAINING PROGRAM

## 2024-01-05 PROCEDURE — 84100 ASSAY OF PHOSPHORUS: CPT

## 2024-01-05 PROCEDURE — 71045 X-RAY EXAM CHEST 1 VIEW: CPT | Mod: 26 | Performed by: RADIOLOGY

## 2024-01-05 RX ORDER — OXYCODONE HCL 5 MG/5 ML
5 SOLUTION, ORAL ORAL EVERY 4 HOURS PRN
Status: DISCONTINUED | OUTPATIENT
Start: 2024-01-05 | End: 2024-01-10

## 2024-01-05 RX ORDER — MECLIZINE HCL 12.5 MG 12.5 MG/1
12.5 TABLET ORAL 3 TIMES DAILY PRN
Status: DISCONTINUED | OUTPATIENT
Start: 2024-01-05 | End: 2024-01-12 | Stop reason: HOSPADM

## 2024-01-05 RX ORDER — GABAPENTIN 300 MG/1
300 CAPSULE ORAL 2 TIMES DAILY
Status: DISCONTINUED | OUTPATIENT
Start: 2024-01-05 | End: 2024-01-07

## 2024-01-05 RX ADMIN — ONDANSETRON 4 MG: 4 TABLET, ORALLY DISINTEGRATING ORAL at 11:12

## 2024-01-05 RX ADMIN — NICOTINE 1 PATCH: 14 PATCH, EXTENDED RELEASE TRANSDERMAL at 07:48

## 2024-01-05 RX ADMIN — ROSUVASTATIN CALCIUM 20 MG: 20 TABLET, FILM COATED ORAL at 07:47

## 2024-01-05 RX ADMIN — HEPARIN SODIUM AND DEXTROSE 1100 UNITS/HR: 10000; 5 INJECTION INTRAVENOUS at 09:48

## 2024-01-05 RX ADMIN — MECLIZINE 12.5 MG: 12.5 TABLET ORAL at 18:00

## 2024-01-05 RX ADMIN — OXYCODONE HYDROCHLORIDE 5 MG: 5 SOLUTION ORAL at 01:22

## 2024-01-05 RX ADMIN — OXYCODONE HYDROCHLORIDE 5 MG: 5 SOLUTION ORAL at 09:55

## 2024-01-05 RX ADMIN — ONDANSETRON 4 MG: 2 INJECTION INTRAMUSCULAR; INTRAVENOUS at 18:01

## 2024-01-05 RX ADMIN — ASPIRIN 81 MG CHEWABLE TABLET 81 MG: 81 TABLET CHEWABLE at 07:48

## 2024-01-05 RX ADMIN — ACETAMINOPHEN 650 MG: 325 TABLET, FILM COATED ORAL at 01:22

## 2024-01-05 RX ADMIN — CEFTRIAXONE SODIUM 1 G: 1 INJECTION, POWDER, FOR SOLUTION INTRAMUSCULAR; INTRAVENOUS at 04:23

## 2024-01-05 RX ADMIN — GABAPENTIN 300 MG: 300 CAPSULE ORAL at 12:27

## 2024-01-05 RX ADMIN — APIXABAN 5 MG: 5 TABLET, FILM COATED ORAL at 20:27

## 2024-01-05 ASSESSMENT — ACTIVITIES OF DAILY LIVING (ADL)
ADLS_ACUITY_SCORE: 42
ADLS_ACUITY_SCORE: 41
ADLS_ACUITY_SCORE: 42
ADLS_ACUITY_SCORE: 42
ADLS_ACUITY_SCORE: 41
ADLS_ACUITY_SCORE: 42
ADLS_ACUITY_SCORE: 41

## 2024-01-05 NOTE — PROGRESS NOTES
[facility]    Stroke Progress Note    Interval Events  Nursing notes reviewed, no acute events overnight. Having increased pain in left arm this morning. Also reporting diffuse burning pain on left side of body. Patient agreeable to trying gabapentin despite listed allergy. No new headaches. Appetite good. No other concerns at this time.    HPI Summary  Louann Lofton is a 63 year old woman with history of HLD, tobacco use disorder, ischemic cardiomyopathy with LV thrombus for which she completed 3 months of warfarin, atrial fibrillation not on anticoagulation, migraine without aura, homelessness, Nissen fundlopication 2009 with chronic abdominal pain, and history of cognitive impairment? who presents with subacute L hemibody weakness with an acute fall, found to have subacute R PCA territory infarcts, possible UTI, and L distal non-displaced ulnar fracture.      Stroke Evaluation Summarized     MRI/Head CT CT Head   IMPRESSION:  1.  New transcortical region of hypodensity identified involving the right occipital lobe with focal hypodensity involving the right thalamus. Findings are age-indeterminate, but concerning for a late acute to subacute ischemic injury within the right   PCA territory.   2.  Age-indeterminate bilateral inferior cerebellar lacunar-type infarctions, new since prior exam.  3.  Chronic left cerebellar infarct.  4.  No hemorrhage.     MRI Brain with and without contrast      IMPRESSION:  1.  Late acute versus subacute right PCA territory infarcts, as above. No hemorrhagic conversion.  2.  Chronic senescent and ischemic changes, as above.   Intracranial Vasculature IMPRESSION:   HEAD CTA:   1.  No definite proximal large vessel occlusion identified.  2.  Severe short segment stenosis/narrowing of the distal right P2 posterior cerebral artery with faint opacification of the right P3/P4 posterior cerebral arteries beyond this finding, possibly  related to a subocclusive thrombus.   3.  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 Follow-up Neuro Interventional Radiology consultation is recommended.  4.  Mild to moderate stenosis of the right cerebral clinoid ICA.      Cervical Vasculature NECK CTA:  1.  Atheromatous disease, as above. No high-grade stenosis or dissection.  2.  Low-density thyroid nodule measuring up to 2 cm. Recommend non-emergent thyroid function testing and thyroid ultrasound for further assessment.      Echocardiogram Large 3.8x1.8x1.2 cm LV apical thrombus noted.  LAD territory akinesis.  Multiple attempts made to contact the team with no success.   EKG/Telemetry Sinus rhythm with short CT interval, R atrial enlargement possible inferior infarct    Other Testing Not Applicable       LDL  1/3/2024: 109 mg/dL   A1C  1/2/2024: 5.9 %   Troponin 1/3/2024: 13 ng/L         Impression   Louann Lofton is a 63 year old woman with history of HLD, polysubstance use disorder, ischemic cardiomyopathy with LV thrombus, atrial fibrillation not on anticoagulation, migraine without aura, homelessness, and history of cognitive impairment? who presents with subacute L hemibody weakness and sensory loss with recent fall, found to have subacute R PCA territory infarcts, possible UTI, and L distal non-displaced ulnar fracture. Urine drug screen positive for amphetamines and cannabinoids. Initial NIHSS 6 in setting of L hemibody weakness and sensory loss with extinction, as well as LUQ homonymous quadrantanopia.     CT/CTA with evidence of subacute R PCA territory infarcts, concern for severe stenosis v.s. subocclusive thrombus of the R P2, and incidental L A2/3 4mm aneurysm. MRI confirms subacute infarction in right PCA territory. TTE revealed large LV thrombus and newly decreased EF. Not a candidate for thrombolysis given outside the time window and established infarctions on imaging.  Not a candidate for thrombectomy given no  proximal large vessel occlusion and subacute presentation of symptoms.     Mechanism of stroke cardioembolic from LV thrombus. Given newly reduced EF, is possible that she had another MI leading to stagnation of blood and ultimately thrombus formation. Started on heparin gtt, now therapeutic. Would ideally bridge to Warfarin, but have concerns about compliance and outpatient follow up for INR monitoring. Will discuss with patient and care coordinator regarding accessibility of INR checks depending on discharge disposition.     Plan  #Subacute R PCA territory infarcts, suspected etiology: cardioembolism from LV thrombus  #Severe stenosis v.s. subocclusive thrombus of R P2  #Incidental L A2/3 4mm aneurysm  - Admit to Neurology  - Neurochecks Q 4 hours  - BP goal normotension, anti-hypertensives for SBP > 180  - Avoid hypotonic IV fluids  - Telemetry, EKG  - Bedside Glucose Monitoring  - PT/OT/SLP  - Stroke Education  - Smoking Cessation  - Depression Screen  - Apnea Screen  - Heparin gtt for now, plan to transition to Warfarin vs DOAC pending ability to get regular INR checks  - ASA 81 mg daily  - Crestor 20 mg  - Gabapentin 300 mg BID  - Outpatient CTA head and neck in 1 year to assess for aneurysm stability      #Demand ischemia   #Hx HTN   #Hx a-fib  #HLD  Troponin leak likely secondary to amphetamine use v.s. pain from L ulnar fracture v.s. subacute stroke.Troponin peaked at 15.  -Pharmacy med rec consult   -PTA valsartan 160 mg daily  -Heparin gtt   -Crestor 20 mg  -Metoprolol tartrate 12.5 mg BID     #Non-displaced distal L ulnar fracture   - Oxycodone 5 mg q4hr prn for pain  - Ortho consult, appreciate recommendations              - Re-splinted  - Follow up in clinic in one week for repeat A/P and lateral x-rays              - Non-weight bearing LUE     #UTI   Not symptomatic. Urine culture growing E. Coli.  - Completed 3 day course of CTX     #Headache  - Magnesium 2 g IV once  - Compazine 10 mg q6 hr prn  -  "Benadryl 25 mg q6 hr prn     #Amphetamine use   -Declines use, declines addiction medicine consult     #Hx tobacco use   -Nicotine patch      #Homelessness   -Social work consult      Prophylaxis            For VTE Prevention:  - heparin SQ     For Acid Suppression:  - None     Patient Follow-up    - final recommendation pending work-up     The patient was discussed with Stroke Fellow, Dr. Hutchins.      Yousif Puente MD  Neurology Resident  Ascom: #[id number]  ______________________________________________________    Clinically Significant Risk Factors                  # Hypertension: Noted on problem list        # Cachexia: Estimated body mass index is 17.71 kg/m  as calculated from the following:    Height as of this encounter: 1.6 m (5' 3\").    Weight as of this encounter: 45.4 kg (100 lb)., PRESENT ON ADMISSION     # Asthma: noted on problem list          Medications   Scheduled Meds   aspirin  81 mg Oral Daily    gabapentin  300 mg Oral BID    insulin aspart  1-7 Units Subcutaneous TID AC    insulin aspart  1-5 Units Subcutaneous At Bedtime    [Held by provider] metoprolol tartrate  12.5 mg Oral BID    nicotine  1 patch Transdermal Daily    nicotine   Transdermal Q8H    rosuvastatin  20 mg Oral Daily    sodium chloride (PF)  3 mL Intracatheter Q8H       Infusion Meds   heparin 1,100 Units/hr (01/05/24 0948)    - MEDICATION INSTRUCTIONS -         PRN Meds  acetaminophen, glucose **OR** dextrose **OR** glucagon, diphenhydrAMINE, labetalol **OR** hydrALAZINE, lidocaine 4%, lidocaine (buffered or not buffered), - MEDICATION INSTRUCTIONS -, ondansetron **OR** ondansetron, oxyCODONE, prochlorperazine, prochlorperazine **OR** prochlorperazine **OR** prochlorperazine, sodium chloride (PF)       PHYSICAL EXAMINATION  Temp:  [97.8  F (36.6  C)-98.2  F (36.8  C)] 98  F (36.7  C)  Pulse:  [55-80] 70  Resp:  [10-23] 11  BP: (121-164)/(77-90) 146/86  SpO2:  [96 %-99 %] 98 %      Neurologic  Mental Status:  alert, oriented x " 3, follows commands, speech clear and fluent, naming and repetition normal  Cranial Nerves:  PERRL, EOMI with normal smooth pursuit, facial movements symmetric, hearing not formally tested but intact to conversation, palate elevation symmetric and uvula midline, no dysarthria, shoulder shrug strong bilaterally, tongue protrusion midline, LUQ visual field cut, decreased sensation left face  Motor:  normal muscle tone and bulk, no abnormal movements, able to move all limbs spontaneously, no pronator drift, strength 5/5 with shoulder abduction on right, 4+/5 on left, elbow flexion/extension 5/5 on right, at least 3/5 on left limited by pain, wiggles fingers on left hand, hip flexion 5/5 on right, 4/5 on left, knee flexion/extension 5/5 on right, 4+/5 on left, dorsiflexion 5/5 bilaterally   Reflexes:   deferred  Sensory:  no extinction on double simultaneous stimulation , decreased sensation to light touch throughout left hemibody  Coordination:  normal finger-to-nose and heel-to-shin bilaterally without dysmetria  Station/Gait:  deferred    Stroke Scales    NIHSS  1a. Level of Consciousness 0-->Alert, keenly responsive   1b. LOC Questions 0-->Answers both questions correctly   1c. LOC Commands 0-->Performs both tasks correctly   2.   Best Gaze 0-->Normal   3.   Visual 1-->Partial hemianopia   4.   Facial Palsy 0-->Normal symmetrical movements   5a. Motor Arm, Left 1-->Drift, limb holds 90 (or 45) degrees, but drifts down before full 10 seconds, does not hit bed or other support   5b. Motor Arm, Right 0-->No drift, limb holds 90 (or 45) degrees for full 10 secs   6a. Motor Leg, Left 0-->No drift, leg holds 30 degree position for full 5 secs   6b. Motor Leg, right 0-->No drift, leg holds 30 degree position for full 5 secs   7.   Limb Ataxia 0-->Absent   8.   Sensory 1-->Mild-to-moderate sensory loss, patient feels pinprick is less sharp or is dull on the affected side, or there is a loss of superficial pain with  pinprick, but patient is aware of being touched   9.   Best Language 0-->No aphasia, normal   10. Dysarthria 0-->Normal   11. Extinction and Inattention  0-->No abnormality   Total 3 (01/05/24 1136)       Imaging  I personally reviewed all imaging; relevant findings per HPI.     Lab Results Data   CBC  Recent Labs   Lab 01/05/24  0636 01/04/24  0820 01/03/24  1350   WBC 6.3 7.0 6.4   RBC 4.44 4.61 4.39   HGB 13.7 14.3 13.7   HCT 41.5 42.2 39.4    315 289     Basic Metabolic Panel    Recent Labs   Lab 01/05/24  0753 01/05/24  0636 01/04/24 2202 01/04/24  1133 01/04/24 0820 01/03/24  1152 01/02/24 2356   NA  --  140  --   --  141  --  143   POTASSIUM  --  4.0  --   --  4.2  --  3.3*   CHLORIDE  --  106  --   --  106  --  107   CO2  --  24  --   --  27  --  27   BUN  --  8.8  --   --  8.2  --  10.9   CR  --  0.62  --   --  0.63  --  0.65   GLC 89 85 109*   < > 93   < > 105*   PB  --  8.5*  --   --  8.6*  --  8.7*    < > = values in this interval not displayed.     Liver Panel  Recent Labs   Lab 01/02/24 2356   PROTTOTAL 6.4   ALBUMIN 3.6   BILITOTAL 0.3   ALKPHOS 69   AST 18   ALT 14     INR    Recent Labs   Lab Test 01/02/24 2356 12/07/20  1550   INR 0.99 1      Lipid Profile    Recent Labs   Lab Test 01/03/24  0213 12/10/20  1113   CHOL 171  --    HDL 46*  --    *  --    TRIG 80 83     A1C    Recent Labs   Lab Test 01/02/24 2356   A1C 5.9*     Troponin    Recent Labs   Lab 01/03/24 0213 01/02/24 2356   CTROPT 13 14  15*          Data

## 2024-01-06 ENCOUNTER — APPOINTMENT (OUTPATIENT)
Dept: PHYSICAL THERAPY | Facility: CLINIC | Age: 64
DRG: 065 | End: 2024-01-06
Payer: MEDICARE

## 2024-01-06 LAB
ANION GAP SERPL CALCULATED.3IONS-SCNC: 6 MMOL/L (ref 7–15)
ATRIAL RATE - MUSE: 70 BPM
BUN SERPL-MCNC: 13.8 MG/DL (ref 8–23)
CA-I BLD-MCNC: 4.4 MG/DL (ref 4.4–5.2)
CALCIUM SERPL-MCNC: 8.5 MG/DL (ref 8.8–10.2)
CHLORIDE SERPL-SCNC: 105 MMOL/L (ref 98–107)
CREAT SERPL-MCNC: 0.75 MG/DL (ref 0.51–0.95)
DEPRECATED HCO3 PLAS-SCNC: 30 MMOL/L (ref 22–29)
DIASTOLIC BLOOD PRESSURE - MUSE: NORMAL MMHG
EGFRCR SERPLBLD CKD-EPI 2021: 90 ML/MIN/1.73M2
ERYTHROCYTE [DISTWIDTH] IN BLOOD BY AUTOMATED COUNT: 12.5 % (ref 10–15)
GLUCOSE BLDC GLUCOMTR-MCNC: 92 MG/DL (ref 70–99)
GLUCOSE BLDC GLUCOMTR-MCNC: 95 MG/DL (ref 70–99)
GLUCOSE BLDC GLUCOMTR-MCNC: 95 MG/DL (ref 70–99)
GLUCOSE SERPL-MCNC: 91 MG/DL (ref 70–99)
HCT VFR BLD AUTO: 37.7 % (ref 35–47)
HGB BLD-MCNC: 12.8 G/DL (ref 11.7–15.7)
INTERPRETATION ECG - MUSE: NORMAL
MCH RBC QN AUTO: 31.3 PG (ref 26.5–33)
MCHC RBC AUTO-ENTMCNC: 34 G/DL (ref 31.5–36.5)
MCV RBC AUTO: 92 FL (ref 78–100)
P AXIS - MUSE: 72 DEGREES
PLATELET # BLD AUTO: 316 10E3/UL (ref 150–450)
POTASSIUM SERPL-SCNC: 4.4 MMOL/L (ref 3.4–5.3)
PR INTERVAL - MUSE: 108 MS
QRS DURATION - MUSE: 74 MS
QT - MUSE: 436 MS
QTC - MUSE: 470 MS
R AXIS - MUSE: 50 DEGREES
RBC # BLD AUTO: 4.09 10E6/UL (ref 3.8–5.2)
SODIUM SERPL-SCNC: 141 MMOL/L (ref 135–145)
SYSTOLIC BLOOD PRESSURE - MUSE: NORMAL MMHG
T AXIS - MUSE: -62 DEGREES
TROPONIN T SERPL HS-MCNC: 11 NG/L
UFH PPP CHRO-ACNC: 1.07 IU/ML
VENTRICULAR RATE- MUSE: 70 BPM
WBC # BLD AUTO: 6.3 10E3/UL (ref 4–11)

## 2024-01-06 PROCEDURE — 36415 COLL VENOUS BLD VENIPUNCTURE: CPT

## 2024-01-06 PROCEDURE — 97116 GAIT TRAINING THERAPY: CPT | Mod: GP

## 2024-01-06 PROCEDURE — 82330 ASSAY OF CALCIUM: CPT

## 2024-01-06 PROCEDURE — 85520 HEPARIN ASSAY: CPT | Performed by: STUDENT IN AN ORGANIZED HEALTH CARE EDUCATION/TRAINING PROGRAM

## 2024-01-06 PROCEDURE — 85014 HEMATOCRIT: CPT

## 2024-01-06 PROCEDURE — 250N000013 HC RX MED GY IP 250 OP 250 PS 637

## 2024-01-06 PROCEDURE — 84484 ASSAY OF TROPONIN QUANT: CPT

## 2024-01-06 PROCEDURE — 120N000002 HC R&B MED SURG/OB UMMC

## 2024-01-06 PROCEDURE — 80048 BASIC METABOLIC PNL TOTAL CA: CPT

## 2024-01-06 PROCEDURE — 93005 ELECTROCARDIOGRAM TRACING: CPT

## 2024-01-06 PROCEDURE — 93010 ELECTROCARDIOGRAM REPORT: CPT | Performed by: INTERNAL MEDICINE

## 2024-01-06 PROCEDURE — 250N000011 HC RX IP 250 OP 636

## 2024-01-06 PROCEDURE — 99232 SBSQ HOSP IP/OBS MODERATE 35: CPT | Mod: GC | Performed by: STUDENT IN AN ORGANIZED HEALTH CARE EDUCATION/TRAINING PROGRAM

## 2024-01-06 RX ORDER — NICOTINE 21 MG/24HR
1 PATCH, TRANSDERMAL 24 HOURS TRANSDERMAL DAILY
Status: DISCONTINUED | OUTPATIENT
Start: 2024-01-06 | End: 2024-01-10

## 2024-01-06 RX ORDER — GABAPENTIN 300 MG/1
300 CAPSULE ORAL 2 TIMES DAILY
Qty: 30 CAPSULE | Refills: 1 | Status: CANCELLED | OUTPATIENT
Start: 2024-01-06

## 2024-01-06 RX ADMIN — ASPIRIN 81 MG CHEWABLE TABLET 81 MG: 81 TABLET CHEWABLE at 09:36

## 2024-01-06 RX ADMIN — GABAPENTIN 300 MG: 300 CAPSULE ORAL at 20:50

## 2024-01-06 RX ADMIN — APIXABAN 5 MG: 5 TABLET, FILM COATED ORAL at 20:50

## 2024-01-06 RX ADMIN — NICOTINE 1 PATCH: 21 PATCH, EXTENDED RELEASE TRANSDERMAL at 16:27

## 2024-01-06 RX ADMIN — ACETAMINOPHEN 650 MG: 325 TABLET, FILM COATED ORAL at 20:56

## 2024-01-06 RX ADMIN — NICOTINE 1 PATCH: 14 PATCH, EXTENDED RELEASE TRANSDERMAL at 09:34

## 2024-01-06 RX ADMIN — OXYCODONE HYDROCHLORIDE 5 MG: 5 SOLUTION ORAL at 06:36

## 2024-01-06 RX ADMIN — ONDANSETRON 4 MG: 4 TABLET, ORALLY DISINTEGRATING ORAL at 20:56

## 2024-01-06 RX ADMIN — MECLIZINE 12.5 MG: 12.5 TABLET ORAL at 20:56

## 2024-01-06 RX ADMIN — ACETAMINOPHEN 650 MG: 325 TABLET, FILM COATED ORAL at 13:29

## 2024-01-06 RX ADMIN — ACETAMINOPHEN 650 MG: 325 TABLET, FILM COATED ORAL at 04:34

## 2024-01-06 RX ADMIN — OXYCODONE HYDROCHLORIDE 5 MG: 5 SOLUTION ORAL at 13:28

## 2024-01-06 RX ADMIN — APIXABAN 5 MG: 5 TABLET, FILM COATED ORAL at 09:36

## 2024-01-06 RX ADMIN — GABAPENTIN 300 MG: 300 CAPSULE ORAL at 09:36

## 2024-01-06 RX ADMIN — ROSUVASTATIN CALCIUM 20 MG: 20 TABLET, FILM COATED ORAL at 09:37

## 2024-01-06 ASSESSMENT — ACTIVITIES OF DAILY LIVING (ADL)
ADLS_ACUITY_SCORE: 44
TOILETING: 1-->ASSISTANCE (EQUIPMENT/PERSON) NEEDED (NOT DEVELOPMENTALLY APPROPRIATE)
ADLS_ACUITY_SCORE: 33
DRESSING/BATHING_DIFFICULTY: NO
ADLS_ACUITY_SCORE: 33
TOILETING: 1-->ASSISTANCE (EQUIPMENT/PERSON) NEEDED
ADLS_ACUITY_SCORE: 33
ADLS_ACUITY_SCORE: 33
DOING_ERRANDS_INDEPENDENTLY_DIFFICULTY: NO
ADLS_ACUITY_SCORE: 33
TOILETING_ASSISTANCE: TOILETING DIFFICULTY, ASSISTANCE 1 PERSON
WEAR_GLASSES_OR_BLIND: NO
TOILETING_ISSUES: YES

## 2024-01-06 NOTE — PROGRESS NOTES
"Care Management Follow Up    Length of Stay (days): 3    Expected Discharge Date: 01/10/2024     Concerns to be Addressed:  discharge planning     Patient plan of care discussed at interdisciplinary rounds: Yes    Anticipated Discharge Disposition:  Homeless Shelter    [facility] Direct Line: [phone]     [facility] line at [phone]      Anticipated Discharge Services:  [facility] lines provided to pt  Anticipated Discharge DME:  None    Patient/family educated on Medicare website which has current facility and service quality ratings:  N/A  Education Provided on the Discharge Plan:  Yes  Patient/Family in Agreement with the Plan:  No    Referrals Placed by CM/SW:    Private pay costs discussed: Not applicable    Additional Information:  SW notified that pt is medically ready for discharge. PT notes from 1/5 recommend home with OP PT. OT notes from 1/3. Per chart review and RNCC consult on 1/3, appears that pt plans to discharge to either to pt son home or homeless shelter. Pt has been on the waitlist to get a wavier through the [facility] since November 2023. Of note, it could take months for pt to complete [employer] assessment and get on wavier to fund group home placement.     SW met with pt at bedside to discuss discharge plan and options. Pt reported that she is not able to discharge to any family or friend's homes at this time. SW discussed homeless shelter options and brought pt phone numbers for both [facility] and [facility]. Pt reported that she has been to [facility] before and spoke about unsatisfactory conditions. Pt discussed calling [facility] already and they provided phone number for SW to call. Pt reported that she was not sure where the phone number was and gave SW permission to look through her belongings for paperwork. SW unable to locate \"orange sheet\" with phone number on it. SW provided pt with [facility] " "phone number again and reported that if shelter needed SW to call them, SW would be happy to do so. SW to check in with pt again this afternoon after she calls [facility]. SW asked if pt had other concerns or needs at this time but pt did not answer. Difficult for this SW to assess need as pt jumped from topics (I.e. talking about her service dog being at her ex-spouse's home and then discussing some person from her past who has since passed). SW attempted to ask clarifying questions to pt but pt would become frustrated with SW.     ADDENDUM 1530: SW met with pt at bedside again to follow up on pt progress with calling [facility]. Pt reported that she did not call and did not plan to do so. SW reiterated that pt was medically ready and asked what pt's discharge plan was as she would need to work with SW on this. Pt reported that if she was \"white\" that she would get a place to stay. SW noted that PT was recommending home with OP PT and d/t this pt's insurance will not cover TCU placement. Pt reiterated that she would not call [facility] and that SW could call the shelter line for her. SW reported that the shelter line would need pt to call on her own behalf and SW offered to assist pt with making the call at bedside. Pt refused. SW noted that SW could assist pt with getting transportation to shelter or try to help as able but pt would need to work with SW. Pt was agitated throughout conversation by yelling at this SW and reporting that she was not leaving the hospital and that hospital staff can \"roll me out of the hospital and leave me on the sidewalk\".     HECTOR updated Neuro Stroke Resident.     ELIZABETH Zabala   [facility]     Social Work and Care Management Department       SEARCHABLE in [facility] - search SOCIAL WORK       [place] (0800 - 1630) Saturday and Sunday     Units: 4A, 4C, & 4E Pager: [phone]     Units: 5A & 5C Pager: " [phone]     Units: 6A & 6B   Pager: [phone]     Units: 6C Pager: [phone]     Units: 7A & 7B  Pager: [phone]     Units: 7C Pager: [phone]     Unit: [facility] [facility] Pager: [phone]      [facility] ([id number]) Saturday and Sunday      Units: 5 Ortho, 5 Med/Surg & WB ED  Pager:[phone]     Units: 6 Med/Surg, 8A, & 10A ICU  Pager: [phone]        After hours (1630 - 0000) Saturday & Sunday; (4018-7914) Mon-Fri; ([id number]) [facility] Recognized Holidays     Units: ALL  Pager: [phone]

## 2024-01-06 NOTE — PROVIDER NOTIFICATION
Provider paged [phone] regarding patient's withdrawal, requesting them to order withdrawal assessments and protocols, no response.

## 2024-01-06 NOTE — PLAN OF CARE
Goal Outcome Evaluation: 1900-0700      Plan of Care Reviewed With: patient    Overall Patient Progress: no change    Status: Acute CVA, UTI   Vitals: VSS on RA.  SBP goal <180  Neuros: A&Ox4. Decrease sensation to light touch on LLE.   Cardiac: Had 6 beats of VTACH around 7pm, MD is aware. Troponin is WNL, Denies chest pain. She stayed at sinus rhythm the rest of the shift.   IV: R PIV SL  Resp/trach: dry intermittent cough. Lung sounds are clear.   Diet: regular diet tolerating well with no n/v.   Bowel status: LBM 1/4  : Voided spont x2  Skin: WNL   Pain: headache that resolved on its own. L arm pain given tylenol with relief  Activity: A1 , GB and walker.    Plan:Continue with pain management and neuro check.     Heparin drip completed. Pt denies any chest pain. Pt refused gabapentin she said it makes her dizzy. Left a note for day team.

## 2024-01-06 NOTE — PROGRESS NOTES
Arrived from: ED   Belongings/meds: clothing and cell phone  2 RN Skin Assessment Completed by: adriana RN and Nereida RN    Non-intact findings documented (yes/no/NA): generalized bruising of left hand and fingers     Status: pt transferred from ED, Fall at ouside, LUE Ulnar FX, R PCA stroke, possible UTI  Vitals: WNL, Hx of afib  Neuros: Oriented x4, generalized weakness,   IV: R PIV SL, heparin gtt 1100 units/hour  Labs/Electrolytes: WNL  Resp/trach: LS clear  Diet: passed swallow eval in ED  Bowel status: LBM 1/5 in AM  : voiding spont, Positive for UTI  Skin: intact  Pain: C/O LUE pain  Activity: up to BE with one and a GB  Social: daughters not present  Plan: Possible transfer to a TCU    Education completed: talked about anticoagulation,    Stroke Patients:   Required education completed:  Pneumoboots in place:

## 2024-01-06 NOTE — PROGRESS NOTES
[facility]    Stroke Progress Note    Interval Events  Nursing notes reviewed, no acute events overnight. C/o pressure around her but that seems better than yesterday. Also c/o slight nausea and dizziness. Ongoing pain on LUE with fracture. Getting oxycodone.     Ready to be discharged. But Denied to go to homeless shelter today. Team needs to talk in the morning along with SW.     HPI Summary  Louann Lofton is a 63 year old woman with history of HLD, tobacco use disorder, ischemic cardiomyopathy with LV thrombus for which she completed 3 months of warfarin, atrial fibrillation not on anticoagulation, migraine without aura, homelessness, Nissen fundlopication 2009 with chronic abdominal pain, and history of cognitive impairment? who presents with subacute L hemibody weakness with an acute fall, found to have subacute R PCA territory infarcts, possible UTI, and L distal non-displaced ulnar fracture.      Stroke Evaluation Summarized     MRI/Head CT CT Head   IMPRESSION:  1.  New transcortical region of hypodensity identified involving the right occipital lobe with focal hypodensity involving the right thalamus. Findings are age-indeterminate, but concerning for a late acute to subacute ischemic injury within the right   PCA territory.   2.  Age-indeterminate bilateral inferior cerebellar lacunar-type infarctions, new since prior exam.  3.  Chronic left cerebellar infarct.  4.  No hemorrhage.     MRI Brain with and without contrast      IMPRESSION:  1.  Late acute versus subacute right PCA territory infarcts, as above. No hemorrhagic conversion.  2.  Chronic senescent and ischemic changes, as above.   Intracranial Vasculature IMPRESSION:   HEAD CTA:   1.  No definite proximal large vessel occlusion identified.  2.  Severe short segment stenosis/narrowing of the distal right P2 posterior cerebral artery with faint opacification of the right P3/P4 posterior cerebral  arteries beyond this finding, possibly related to a subocclusive thrombus.   3.  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 Follow-up Neuro Interventional Radiology consultation is recommended.  4.  Mild to moderate stenosis of the right cerebral clinoid ICA.      Cervical Vasculature NECK CTA:  1.  Atheromatous disease, as above. No high-grade stenosis or dissection.  2.  Low-density thyroid nodule measuring up to 2 cm. Recommend non-emergent thyroid function testing and thyroid ultrasound for further assessment.      Echocardiogram Large 3.8x1.8x1.2 cm LV apical thrombus noted.  LAD territory akinesis.  Multiple attempts made to contact the team with no success.   EKG/Telemetry Sinus rhythm with short FL interval, R atrial enlargement possible inferior infarct    Other Testing Not Applicable       LDL  1/3/2024: 109 mg/dL   A1C  1/2/2024: 5.9 %   Troponin 1/3/2024: 13 ng/L         Impression   Louann Lofton is a 63 year old woman with history of HLD, polysubstance use disorder, ischemic cardiomyopathy with LV thrombus, atrial fibrillation not on anticoagulation, migraine without aura, homelessness, and history of cognitive impairment? who presents with subacute L hemibody weakness and sensory loss with recent fall, found to have subacute R PCA territory infarcts, possible UTI, and L distal non-displaced ulnar fracture. Urine drug screen positive for amphetamines and cannabinoids. Initial NIHSS 6 in setting of L hemibody weakness and sensory loss with extinction, as well as LUQ homonymous quadrantanopia.     CT/CTA with evidence of subacute R PCA territory infarcts, concern for severe stenosis v.s. subocclusive thrombus of the R P2, and incidental L A2/3 4mm aneurysm. MRI confirms subacute infarction in right PCA territory. TTE revealed large LV thrombus and newly decreased EF. Not a candidate for thrombolysis given outside the time window and established infarctions on imaging.  Not a  candidate for thrombectomy given no proximal large vessel occlusion and subacute presentation of symptoms.     Mechanism of stroke cardioembolic from LV thrombus. Given newly reduced EF, is possible that she had another MI leading to stagnation of blood and ultimately thrombus formation. Started on heparin gtt, now therapeutic. Would ideally bridge to Warfarin, but have concerns about compliance and outpatient follow up for INR monitoring. Will discuss with patient and care coordinator regarding accessibility of INR checks depending on discharge disposition.     Plan  #Subacute R PCA territory infarcts, suspected etiology: cardioembolism from LV thrombus  #Severe stenosis v.s. subocclusive thrombus of R P2  #Incidental L A2/3 4mm aneurysm  - Admit to Neurology  - Neurochecks Q 4 hours  - BP goal normotension, anti-hypertensives for SBP > 180  - Avoid hypotonic IV fluids  - Telemetry, EKG  - Bedside Glucose Monitoring  - PT/OT/SLP  - Stroke Education  - Smoking Cessation  - Depression Screen  - Apnea Screen  - Eliquis 5mg BID  - ASA 81 mg daily (Needs from cardia standpoint)  - Crestor 20 mg  - Gabapentin 300 mg BID  - Outpatient CTA head and neck in 1 year to assess for aneurysm stability      #Demand ischemia   #Hx HTN   #Hx a-fib  #HLD  Troponin leak likely secondary to amphetamine use v.s. pain from L ulnar fracture v.s. subacute stroke.Troponin peaked at 15. Downtrended.   -Pharmacy med rec consult   -PTA valsartan 160 mg daily  -Crestor 20 mg  -Metoprolol tartrate 12.5 mg BID     #Non-displaced distal L ulnar fracture   - Oxycodone 5 mg q4hr prn for pain  - Ortho consult, appreciate recommendations              - Re-splinted  - Follow up in clinic in one week for repeat A/P and lateral x-rays              - Non-weight bearing LUE     #UTI   Not symptomatic. Urine culture growing E. Coli.  - Completed 3 day course of CTX     #Headache  - Magnesium 2 g IV once  - Compazine 10 mg q6 hr prn  - Benadryl 25 mg q6 hr  "prn     #Amphetamine use   -Declines use, declines addiction medicine consult     #Hx tobacco use   -Nicotine patch >Increased to 21mg     #Homelessness   -Social work consult      Prophylaxis            For VTE Prevention:  - heparin SQ     For Acid Suppression:  - None     Patient Follow-up    -Cardiology, mental health, and ortho follow up  -Stroke follow up  -Neuro IR follow up   The patient was discussed with Stroke Fellow, Dr. Hutchins.      Beulah Fontaine MD  Neurology Resident  Ascom: #[id number]  ______________________________________________________    Clinically Significant Risk Factors                  # Hypertension: Noted on problem list        # Cachexia: Estimated body mass index is 17.71 kg/m  as calculated from the following:    Height as of this encounter: 1.6 m (5' 3\").    Weight as of this encounter: 45.4 kg (100 lb)., PRESENT ON ADMISSION     # Asthma: noted on problem list          Medications   Scheduled Meds   apixaban ANTICOAGULANT  5 mg Oral BID    aspirin  81 mg Oral Daily    gabapentin  300 mg Oral BID    insulin aspart  1-7 Units Subcutaneous TID AC    insulin aspart  1-5 Units Subcutaneous At Bedtime    [Held by provider] metoprolol tartrate  12.5 mg Oral BID    nicotine  1 patch Transdermal Daily    nicotine   Transdermal Q8H    rosuvastatin  20 mg Oral Daily    sodium chloride (PF)  3 mL Intracatheter Q8H       Infusion Meds   - MEDICATION INSTRUCTIONS -         PRN Meds  acetaminophen, glucose **OR** dextrose **OR** glucagon, diphenhydrAMINE, labetalol **OR** hydrALAZINE, lidocaine 4%, lidocaine (buffered or not buffered), meclizine, - MEDICATION INSTRUCTIONS -, ondansetron **OR** ondansetron, oxyCODONE, prochlorperazine, prochlorperazine **OR** prochlorperazine **OR** prochlorperazine, sodium chloride (PF)       PHYSICAL EXAMINATION  Temp:  [97.4  F (36.3  C)-98.8  F (37.1  C)] 98  F (36.7  C)  Pulse:  [68-79] 70  Resp:  [13-20] 14  BP: (113-166)/(63-88) 166/87  SpO2:  [97 %-100 %] 98 % "      Neurologic  Mental Status:  alert, oriented x 3, follows commands, speech clear and fluent, naming and repetition normal  Cranial Nerves:  PERRL, EOMI with normal smooth pursuit, facial movements symmetric, hearing not formally tested but intact to conversation, palate elevation symmetric and uvula midline, no dysarthria, shoulder shrug strong bilaterally, tongue protrusion midline, LUQ visual field cut, decreased sensation left face  Motor:  normal muscle tone and bulk, no abnormal movements, able to move all limbs spontaneously, no pronator drift, strength 5/5 with shoulder abduction on right, 4+/5 on left, elbow flexion/extension 5/5 on right, at least 3/5 on left limited by pain, wiggles fingers on left hand, hip flexion 5/5 on right, 4/5 on left, knee flexion/extension 5/5 on right, 4+/5 on left, dorsiflexion 5/5 bilaterally   Reflexes:   deferred  Sensory:  no extinction on double simultaneous stimulation , decreased sensation to light touch throughout left hemibody  Coordination:  normal finger-to-nose and heel-to-shin bilaterally without dysmetria  Station/Gait:  deferred    Stroke Scales    NIHSS  1a. Level of Consciousness 0-->Alert, keenly responsive   1b. LOC Questions 0-->Answers both questions correctly   1c. LOC Commands 0-->Performs both tasks correctly   2.   Best Gaze 0-->Normal   3.   Visual 1-->Partial hemianopia   4.   Facial Palsy 0-->Normal symmetrical movements   5a. Motor Arm, Left 1-->Drift, limb holds 90 (or 45) degrees, but drifts down before full 10 seconds, does not hit bed or other support   5b. Motor Arm, Right 0-->No drift, limb holds 90 (or 45) degrees for full 10 secs   6a. Motor Leg, Left 0-->No drift, leg holds 30 degree position for full 5 secs   6b. Motor Leg, right 0-->No drift, leg holds 30 degree position for full 5 secs   7.   Limb Ataxia 0-->Absent   8.   Sensory 1-->Mild-to-moderate sensory loss, patient feels pinprick is less sharp or is dull on the affected side,  or there is a loss of superficial pain with pinprick, but patient is aware of being touched   9.   Best Language 0-->No aphasia, normal   10. Dysarthria 0-->Normal   11. Extinction and Inattention  0-->No abnormality   Total 3 (01/05/24 1136)       Imaging  I personally reviewed all imaging; relevant findings per HPI.     Lab Results Data   CBC  Recent Labs   Lab 01/06/24  0505 01/05/24  0636 01/04/24  0820   WBC 6.3 6.3 7.0   RBC 4.09 4.44 4.61   HGB 12.8 13.7 14.3   HCT 37.7 41.5 42.2    311 315     Basic Metabolic Panel    Recent Labs   Lab 01/06/24  1231 01/06/24  0727 01/06/24  0505 01/05/24  0753 01/05/24  0636 01/04/24  1133 01/04/24  0820   NA  --   --  141  --  140  --  141   POTASSIUM  --   --  4.4  --  4.0  --  4.2   CHLORIDE  --   --  105  --  106  --  106   CO2  --   --  30*  --  24  --  27   BUN  --   --  13.8  --  8.8  --  8.2   CR  --   --  0.75  --  0.62  --  0.63   GLC 95 95 91   < > 85   < > 93   PB  --   --  8.5*  --  8.5*  --  8.6*    < > = values in this interval not displayed.     Liver Panel  Recent Labs   Lab 01/02/24 2356   PROTTOTAL 6.4   ALBUMIN 3.6   BILITOTAL 0.3   ALKPHOS 69   AST 18   ALT 14     INR    Recent Labs   Lab Test 01/02/24 2356 12/07/20  1550   INR 0.99 1      Lipid Profile    Recent Labs   Lab Test 01/03/24  0213 12/10/20  1113   CHOL 171  --    HDL 46*  --    *  --    TRIG 80 83     A1C    Recent Labs   Lab Test 01/02/24 2356   A1C 5.9*     Troponin    Recent Labs   Lab 01/06/24  0047 01/05/24  1858 01/05/24  0636   CTROPT 11 10 11          Data

## 2024-01-06 NOTE — PROGRESS NOTES
Charge Note: V-TACH    Data: At 1955 patient had a 6 beat run of V-Tach.  Neuro Stroke Paged.    Addendum: No chest pain. Vital signs 130/66, RR 18, HR 69, QTc 423          Tanisha Lim, RADHIKAN, RN, PHN, CNRN  Evening Charge Nurse Unit 6A  [phone]

## 2024-01-06 NOTE — PROGRESS NOTES
"Patient agitated, yelling, slamming doors. Upset after speaking with  regarding access to shelters. Upset about showering, her cast is wrapped and nursing is helping her shower. Patient yelling that she will leave AMA, wants to \"get out of here.\" Spoke with provider, discharge orders are ready if she decides to go, otherwise social work is ready to facilitate her transfer to a shelter if she decides to call and set up a room there.   "

## 2024-01-07 LAB
ANION GAP SERPL CALCULATED.3IONS-SCNC: 8 MMOL/L (ref 7–15)
ATRIAL RATE - MUSE: 56 BPM
ATRIAL RATE - MUSE: 74 BPM
BUN SERPL-MCNC: 19.4 MG/DL (ref 8–23)
CALCIUM SERPL-MCNC: 8.7 MG/DL (ref 8.8–10.2)
CHLORIDE SERPL-SCNC: 104 MMOL/L (ref 98–107)
CREAT SERPL-MCNC: 0.73 MG/DL (ref 0.51–0.95)
DEPRECATED HCO3 PLAS-SCNC: 28 MMOL/L (ref 22–29)
DIASTOLIC BLOOD PRESSURE - MUSE: NORMAL MMHG
DIASTOLIC BLOOD PRESSURE - MUSE: NORMAL MMHG
EGFRCR SERPLBLD CKD-EPI 2021: >90 ML/MIN/1.73M2
ERYTHROCYTE [DISTWIDTH] IN BLOOD BY AUTOMATED COUNT: 12.3 % (ref 10–15)
GLUCOSE SERPL-MCNC: 89 MG/DL (ref 70–99)
HCT VFR BLD AUTO: 39.9 % (ref 35–47)
HGB BLD-MCNC: 12.8 G/DL (ref 11.7–15.7)
HOLD SPECIMEN: NORMAL
INTERPRETATION ECG - MUSE: NORMAL
INTERPRETATION ECG - MUSE: NORMAL
MAGNESIUM SERPL-MCNC: 2.1 MG/DL (ref 1.7–2.3)
MCH RBC QN AUTO: 30.5 PG (ref 26.5–33)
MCHC RBC AUTO-ENTMCNC: 32.1 G/DL (ref 31.5–36.5)
MCV RBC AUTO: 95 FL (ref 78–100)
P AXIS - MUSE: 62 DEGREES
P AXIS - MUSE: 73 DEGREES
PLATELET # BLD AUTO: 331 10E3/UL (ref 150–450)
POTASSIUM SERPL-SCNC: 4.3 MMOL/L (ref 3.4–5.3)
PR INTERVAL - MUSE: 112 MS
PR INTERVAL - MUSE: 122 MS
QRS DURATION - MUSE: 82 MS
QRS DURATION - MUSE: 90 MS
QT - MUSE: 422 MS
QT - MUSE: 490 MS
QTC - MUSE: 468 MS
QTC - MUSE: 472 MS
R AXIS - MUSE: 41 DEGREES
R AXIS - MUSE: 45 DEGREES
RBC # BLD AUTO: 4.2 10E6/UL (ref 3.8–5.2)
SODIUM SERPL-SCNC: 140 MMOL/L (ref 135–145)
SYSTOLIC BLOOD PRESSURE - MUSE: NORMAL MMHG
SYSTOLIC BLOOD PRESSURE - MUSE: NORMAL MMHG
T AXIS - MUSE: 106 DEGREES
T AXIS - MUSE: 125 DEGREES
TROPONIN T SERPL HS-MCNC: 9 NG/L
VENTRICULAR RATE- MUSE: 56 BPM
VENTRICULAR RATE- MUSE: 74 BPM
WBC # BLD AUTO: 6.5 10E3/UL (ref 4–11)

## 2024-01-07 PROCEDURE — 250N000013 HC RX MED GY IP 250 OP 250 PS 637

## 2024-01-07 PROCEDURE — 85027 COMPLETE CBC AUTOMATED: CPT

## 2024-01-07 PROCEDURE — 84484 ASSAY OF TROPONIN QUANT: CPT | Performed by: STUDENT IN AN ORGANIZED HEALTH CARE EDUCATION/TRAINING PROGRAM

## 2024-01-07 PROCEDURE — 120N000002 HC R&B MED SURG/OB UMMC

## 2024-01-07 PROCEDURE — 99232 SBSQ HOSP IP/OBS MODERATE 35: CPT | Mod: GC | Performed by: STUDENT IN AN ORGANIZED HEALTH CARE EDUCATION/TRAINING PROGRAM

## 2024-01-07 PROCEDURE — 36415 COLL VENOUS BLD VENIPUNCTURE: CPT | Performed by: STUDENT IN AN ORGANIZED HEALTH CARE EDUCATION/TRAINING PROGRAM

## 2024-01-07 PROCEDURE — 83735 ASSAY OF MAGNESIUM: CPT | Performed by: STUDENT IN AN ORGANIZED HEALTH CARE EDUCATION/TRAINING PROGRAM

## 2024-01-07 PROCEDURE — 80048 BASIC METABOLIC PNL TOTAL CA: CPT

## 2024-01-07 PROCEDURE — 36415 COLL VENOUS BLD VENIPUNCTURE: CPT

## 2024-01-07 PROCEDURE — 93005 ELECTROCARDIOGRAM TRACING: CPT

## 2024-01-07 PROCEDURE — 93010 ELECTROCARDIOGRAM REPORT: CPT | Performed by: INTERNAL MEDICINE

## 2024-01-07 RX ORDER — PREGABALIN 25 MG/1
50 CAPSULE ORAL 2 TIMES DAILY
Status: DISCONTINUED | OUTPATIENT
Start: 2024-01-07 | End: 2024-01-10

## 2024-01-07 RX ADMIN — OXYCODONE HYDROCHLORIDE 5 MG: 5 SOLUTION ORAL at 14:03

## 2024-01-07 RX ADMIN — APIXABAN 5 MG: 5 TABLET, FILM COATED ORAL at 08:37

## 2024-01-07 RX ADMIN — APIXABAN 5 MG: 5 TABLET, FILM COATED ORAL at 20:46

## 2024-01-07 RX ADMIN — ROSUVASTATIN CALCIUM 20 MG: 20 TABLET, FILM COATED ORAL at 08:37

## 2024-01-07 RX ADMIN — GABAPENTIN 300 MG: 300 CAPSULE ORAL at 08:37

## 2024-01-07 RX ADMIN — ACETAMINOPHEN 650 MG: 325 TABLET, FILM COATED ORAL at 08:50

## 2024-01-07 RX ADMIN — ACETAMINOPHEN 650 MG: 325 TABLET, FILM COATED ORAL at 18:25

## 2024-01-07 RX ADMIN — ACETAMINOPHEN 650 MG: 325 TABLET, FILM COATED ORAL at 04:28

## 2024-01-07 RX ADMIN — ACETAMINOPHEN 650 MG: 325 TABLET, FILM COATED ORAL at 14:03

## 2024-01-07 RX ADMIN — PREGABALIN 50 MG: 25 CAPSULE ORAL at 20:46

## 2024-01-07 RX ADMIN — ASPIRIN 81 MG CHEWABLE TABLET 81 MG: 81 TABLET CHEWABLE at 08:37

## 2024-01-07 RX ADMIN — OXYCODONE HYDROCHLORIDE 5 MG: 5 SOLUTION ORAL at 04:28

## 2024-01-07 RX ADMIN — NICOTINE 1 PATCH: 21 PATCH, EXTENDED RELEASE TRANSDERMAL at 08:38

## 2024-01-07 RX ADMIN — OXYCODONE HYDROCHLORIDE 5 MG: 5 SOLUTION ORAL at 18:25

## 2024-01-07 RX ADMIN — OXYCODONE HYDROCHLORIDE 5 MG: 5 SOLUTION ORAL at 08:50

## 2024-01-07 ASSESSMENT — ACTIVITIES OF DAILY LIVING (ADL)
ADLS_ACUITY_SCORE: 29
ADLS_ACUITY_SCORE: 29
ADLS_ACUITY_SCORE: 32
ADLS_ACUITY_SCORE: 32
ADLS_ACUITY_SCORE: 29
ADLS_ACUITY_SCORE: 32
ADLS_ACUITY_SCORE: 29

## 2024-01-07 NOTE — PROVIDER NOTIFICATION
CRISS Dillard   Pt had a 7 beat run of VTACH at 0227    thanks, wade [phone]    Sent 0233 to neurology [ Msg Id [id number] ]    called lab for AM lab draw + Mag

## 2024-01-07 NOTE — PROVIDER NOTIFICATION
"NONURGENT 9298 CRISS Lofton  Pt restless, c/o of pain \"all over\", mainly her L shoulder, LUQ/L ribs. Gave PRN oxy/tylenol @ 8666 with no relief. Can she get something else?  thanks, wade [phone]    Sent to NEUROLOGY RESIDENT [ Msg Id [id number] ] @ 3354  "

## 2024-01-07 NOTE — PROVIDER NOTIFICATION
"Provider notified about 6beat run of Vtach followed one minute later by 8beat run of Vtach with triplet PVC. Patient stated her heart was \"running around.\" BP WNL. Provider will order an EKG.  "

## 2024-01-07 NOTE — PROVIDER NOTIFICATION
CRISS Dillard 2386 pt had a 5 beat run of VTACH again at 0544.    thanks, wade [phone]    [id number] ] at 0512

## 2024-01-07 NOTE — PLAN OF CARE
Status: pt transferred from ED 1/5, Fall at ouside, LUE Ulnar FX, R PCA stroke, possible UTI  Vitals: WNL, Hx of afib  Neuros: Oriented x4, generalized weakness, LUE<RUE, pt labile crying at times, can get agitated.   IV: R PIV SL  Labs/Electrolytes: WNL  Resp/trach: LS clear  Diet: passed swallow eval in ED  Bowel status: LBM 1/5 in AM  : voiding spont, Positive for UTI  Skin: intact  Pain: C/O LUE pain, oxycodone x1  Activity: up to BS with one and a GB  Social: family  not present  Plan: Possible transfer to a TCU     Education completed: talked about anticoagulation,     Stroke Patients: given stroke booklet  Required education completed:  Pneumoboots in place:  pt refused

## 2024-01-07 NOTE — PROVIDER NOTIFICATION
"\"SHERICE Lofton 6208    RN finished talking with pt. pt report spasms radiating from back to front of her chest, slight SOB, chest pain. Do you want a troponin drawn? prev 12 lead showed ST elevation in 2 leads. ca Bowen 6A charge\"        Paged neuro resident on call at 3316      Addendum: MD called back at 0448 and placed order for troponin and repeat 12 lead EKG.  "

## 2024-01-07 NOTE — PROVIDER NOTIFICATION
"\"SHERICE Lofton rm 0050 6A Neuro Stroke    pt just had a 13 beat run of Jolanta at 0417. Also some labs have resulted. Mag 2.1, Calcium 8.7, potassium 4.3. Thanks Andrés 6A Charge RN\"        Paged neurology resident on call at [phone]    "

## 2024-01-07 NOTE — PLAN OF CARE
Status: Pt admitted from ED after a fall and L hemibody weakness, found to have subacute R PCA territory infarcts, possible UTI, and L distal non-displaced ulnar fracture.   Vitals: VSS on RA ex HTN within parameter. Intermittent irregular HR, team aware.   Neuros: A&O x4. Cooperative with cares, but very labile mood. Pt frequently agitated by staff, tangential, and fixates on things unrelated to the topic at hand. Strengths 4/5 ex LUE 3/5. Decreased sensation to L side of body. L field cut. Dizziness, pt believes it is from gabapentin which has been discontinued.   IV: PIV SL x2.   Labs/Electrolytes: WDL.   Resp/trach: LS clear. Denies SOB.   Diet: Regular, fair intake.   Bowel status: BS+. LBM 1/5.   : Voids spontaneously.   Skin: Splint to LUE.   Pain: PRN Tylenol and oxy given for severe pain in LUE and left ribs, partially effective.   Activity: Assist of one, GB. Pt unsteady d/t dizziness.   Social: Family at bedside this afternoon, supportive of pt.   Plan: XR wrist start ordered this morning. SW helping with discharge placement, now pursuing a medical respite shelter.   Updates this shift: Splint was wrapped/covered for shower, but pt was non-compliant with showerhead and sprayed water at arm throughout, causing the splint to get wet and start dissolving. Orthopedics notified and came to bedside to replace splint.     Stroke Patients:   Required education completed: stroke booklet given, not yet completed  Pneumoboots in place: pt refused    Goal Outcome Evaluation:    Plan of Care Reviewed With: patient  Overall Patient Progress: no change  Outcome Evaluation: SW helping with discharge plans.
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
## 2024-01-07 NOTE — PLAN OF CARE
Status: pt transferred from ED 1/5, Fall at ouside, LUE Ulnar FX, R PCA stroke, and UTI   Vitals: several runs of vtach and couple runs of triplet PVCs. Team aware- EKG x2 and early morning lab draw, and troponin obtained  Neuros: A&Ox4, tearful and frustrated but cooperative w/cares. NIH 2-3. LUE 3, AOE 4. RUE drift. L side decreased sensation. L field cut. Dizziness.  IV: PIV SL  Labs/Electrolytes: Mag 2.1, Calcium 8.7, potassium 4.3. Troponin 9  Resp/trach: LSC. Reporting SOB  Diet: Regular, zofran x1 for nausea  Bowel status: LBM 1/5  : Voiding spontaneously in the BR  Skin: Cast/sling to LUE.  Pain: Managed w/tylenol, gabapentin, oxycodone. Meclizine for dizziness.  Activity: A1, GB  Plan: Awaiting placement.

## 2024-01-07 NOTE — PROGRESS NOTES
[facility]    Stroke Progress Note    Interval Events  Nursing notes reviewed. Several non-sustained runs of V-tach overnight. EKG, troponin, electrolytes all unremarkable.     Having worsening pain on left chest wall and left shoulder this morning. Due for next doses of oxycodone and Tylenol. No shortness of breath. Continues to feel numb on left side of body. Hoping to be able to shower today.     HPI Summary  Louann Lofton is a 63 year old woman with history of HLD, tobacco use disorder, ischemic cardiomyopathy with LV thrombus for which she completed 3 months of warfarin, atrial fibrillation not on anticoagulation, migraine without aura, homelessness, Nissen fundlopication 2009 with chronic abdominal pain, and history of cognitive impairment? who presents with subacute L hemibody weakness with an acute fall, found to have subacute R PCA territory infarcts, possible UTI, and L distal non-displaced ulnar fracture.      Stroke Evaluation Summarized     MRI/Head CT CT Head   IMPRESSION:  1.  New transcortical region of hypodensity identified involving the right occipital lobe with focal hypodensity involving the right thalamus. Findings are age-indeterminate, but concerning for a late acute to subacute ischemic injury within the right   PCA territory.   2.  Age-indeterminate bilateral inferior cerebellar lacunar-type infarctions, new since prior exam.  3.  Chronic left cerebellar infarct.  4.  No hemorrhage.     MRI Brain with and without contrast      IMPRESSION:  1.  Late acute versus subacute right PCA territory infarcts, as above. No hemorrhagic conversion.  2.  Chronic senescent and ischemic changes, as above.   Intracranial Vasculature IMPRESSION:   HEAD CTA:   1.  No definite proximal large vessel occlusion identified.  2.  Severe short segment stenosis/narrowing of the distal right P2 posterior cerebral artery with faint opacification of the right P3/P4  posterior cerebral arteries beyond this finding, possibly related to a subocclusive thrombus.   3.  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 Follow-up Neuro Interventional Radiology consultation is recommended.  4.  Mild to moderate stenosis of the right cerebral clinoid ICA.      Cervical Vasculature NECK CTA:  1.  Atheromatous disease, as above. No high-grade stenosis or dissection.  2.  Low-density thyroid nodule measuring up to 2 cm. Recommend non-emergent thyroid function testing and thyroid ultrasound for further assessment.      Echocardiogram Large 3.8x1.8x1.2 cm LV apical thrombus noted.  LAD territory akinesis.  Multiple attempts made to contact the team with no success.   EKG/Telemetry Sinus rhythm with short FL interval, R atrial enlargement possible inferior infarct    Other Testing Not Applicable       LDL  1/3/2024: 109 mg/dL   A1C  1/2/2024: 5.9 %   Troponin 1/3/2024: 13 ng/L         Impression   Louann Lofton is a 63 year old woman with history of HLD, polysubstance use disorder, ischemic cardiomyopathy with LV thrombus, atrial fibrillation not on anticoagulation, migraine without aura, homelessness, and history of cognitive impairment? who presents with subacute L hemibody weakness and sensory loss with recent fall, found to have subacute R PCA territory infarcts, possible UTI, and L distal non-displaced ulnar fracture. Urine drug screen positive for amphetamines and cannabinoids. Initial NIHSS 6 in setting of L hemibody weakness and sensory loss with extinction, as well as LUQ homonymous quadrantanopia.     CT/CTA with evidence of subacute R PCA territory infarcts, concern for severe stenosis v.s. subocclusive thrombus of the R P2, and incidental L A2/3 4mm aneurysm. MRI confirms subacute infarction in right PCA territory. TTE revealed large LV thrombus and newly decreased EF. Not a candidate for thrombolysis given outside the time window and established infarctions on  imaging.  Not a candidate for thrombectomy given no proximal large vessel occlusion and subacute presentation of symptoms.     Mechanism of stroke cardioembolic from LV thrombus. Given newly reduced EF, is possible that she had another MI leading to stagnation of blood and ultimately thrombus formation. Started on heparin gtt, now therapeutic. Would ideally bridge to Warfarin, but have concerns about compliance and outpatient follow up for INR monitoring. Will anticoagulate with Eliquis instead.      Plan  #Subacute R PCA territory infarcts, suspected etiology: cardioembolism from LV thrombus  #Severe stenosis v.s. subocclusive thrombus of R P2  #Incidental L A2/3 4mm aneurysm  - Admit to Neurology  - Neurochecks Q 4 hours  - BP goal normotension, anti-hypertensives for SBP > 180  - Avoid hypotonic IV fluids  - Telemetry, EKG  - Bedside Glucose Monitoring  - PT/OT/SLP  - Stroke Education  - Smoking Cessation  - Depression Screen  - Apnea Screen  - Apixaban 5 mg BID  - ASA 81 mg daily  - Crestor 20 mg  - Lyrica 50 mg BID     #Demand ischemia   #Hx HTN   #Hx a-fib  #HLD  Troponin leak likely secondary to amphetamine use v.s. pain from L ulnar fracture v.s. subacute stroke.Troponin peaked at 15.  -Pharmacy med rec consult   -Hold PTA valsartan 160 mg daily  -Eliquis 5 mg BID  -Crestor 20 mg  -Holding Metoprolol tartrate 12.5 mg BID (HR)     #Non-displaced distal L ulnar fracture   - Oxycodone 5 mg q4hr prn for pain  - Ortho consult, appreciate recommendations              - Re-splinted  - Follow up in clinic in one week for repeat A/P and lateral x-rays              - Non-weight bearing LUE     #UTI, resolved  Not symptomatic. Urine culture growing E. Coli.  - Completed 3 day course of CTX     #Headache, resolved  - Magnesium 2 g IV once  - Compazine 10 mg q6 hr prn  - Benadryl 25 mg q6 hr prn     #Amphetamine use   -Declines use, declines addiction medicine consult     #Hx tobacco use   -Nicotine patch     "  #Homelessness   -Social work consult  - Planning to discharge to medical respite center due to frequent follow up visits required post-hospitalization and concerns regarding compliance with anticoagulation      Prophylaxis            For VTE Prevention:  - heparin SQ     For Acid Suppression:  - None     Patient Follow-up    - Neuro-ophthalmology follow up  - Stroke EFE follow up   - Cardiology follow up  - Orthopedic follow up in 1 week (1/10/24) for repeat X-rays  - Outpatient CTA head and neck in 1 year to assess for aneurysm stability      The patient was discussed with Stroke Fellow, Dr. Hutchins.      Yousif Puente MD  Neurology Resident  Ascom: #[id number]  ______________________________________________________    Clinically Significant Risk Factors                  # Hypertension: Noted on problem list        # Cachexia: Estimated body mass index is 17.71 kg/m  as calculated from the following:    Height as of this encounter: 1.6 m (5' 3\").    Weight as of this encounter: 45.4 kg (100 lb).      # Asthma: noted on problem list          Medications   Scheduled Meds   apixaban ANTICOAGULANT  5 mg Oral BID    aspirin  81 mg Oral Daily    [Held by provider] metoprolol tartrate  12.5 mg Oral BID    nicotine  1 patch Transdermal Daily    nicotine   Transdermal Q8H    nicotine   Transdermal Q8H    pregabalin  50 mg Oral BID    rosuvastatin  20 mg Oral Daily    sodium chloride (PF)  3 mL Intracatheter Q8H       Infusion Meds   - MEDICATION INSTRUCTIONS -         PRN Meds  acetaminophen, diphenhydrAMINE, labetalol **OR** hydrALAZINE, lidocaine 4%, lidocaine (buffered or not buffered), meclizine, - MEDICATION INSTRUCTIONS -, ondansetron **OR** ondansetron, oxyCODONE, prochlorperazine, prochlorperazine **OR** prochlorperazine **OR** prochlorperazine, sodium chloride (PF)       PHYSICAL EXAMINATION  Temp:  [97.8  F (36.6  C)-98.7  F (37.1  C)] 98.7  F (37.1  C)  Pulse:  [62-88] 71  Resp:  [10-20] 15  BP: " (117-155)/(66-89) 155/89  SpO2:  [97 %-100 %] 98 %      Neurologic  Mental Status:  alert, oriented x 3, follows commands, speech clear and fluent, naming and repetition normal  Cranial Nerves:  PERRL, EOMI with normal smooth pursuit, facial movements symmetric, hearing not formally tested but intact to conversation, palate elevation symmetric and uvula midline, no dysarthria, shoulder shrug strong bilaterally, tongue protrusion midline, decreased sensation to light touch in V1-V3 distribution on left face, normal on right, LUQ visual field cut  Motor:  normal muscle tone and bulk, no abnormal movements, able to move all limbs spontaneously, no pronator drift, 5/5 strength throughout all four extremities with exception of left hip flexion (4+/5)  Reflexes:  toes down-going  Sensory:  no extinction on double simultaneous stimulation , decreased sensation to light touch in LUE and LLE compared to right side  Coordination:  normal finger-to-nose and heel-to-shin bilaterally without dysmetria, rapid alternating movements symmetric  Station/Gait:  deferred    Stroke Scales    NIHSS  1a. Level of Consciousness 0-->Alert, keenly responsive   1b. LOC Questions 0-->Answers both questions correctly   1c. LOC Commands 0-->Performs both tasks correctly   2.   Best Gaze 0-->Normal   3.   Visual 1-->Partial hemianopia   4.   Facial Palsy 0-->Normal symmetrical movements   5a. Motor Arm, Left 0-->No drift, limb holds 90 (or 45) degrees for full 10 secs   5b. Motor Arm, Right 0-->No drift, limb holds 90 (or 45) degrees for full 10 secs   6a. Motor Leg, Left 0-->No drift, leg holds 30 degree position for full 5 secs   6b. Motor Leg, right 0-->No drift, leg holds 30 degree position for full 5 secs   7.   Limb Ataxia 0-->Absent   8.   Sensory 1-->Mild-to-moderate sensory loss, patient feels pinprick is less sharp or is dull on the affected side, or there is a loss of superficial pain with pinprick, but patient is aware of being  touched   9.   Best Language 0-->No aphasia, normal   10. Dysarthria 0-->Normal   11. Extinction and Inattention  0-->No abnormality   Total 2 (01/07/24 1332)       Imaging  I personally reviewed all imaging; relevant findings per HPI.     Lab Results Data   CBC  Recent Labs   Lab 01/07/24 0311 01/06/24  0505 01/05/24  0636   WBC 6.5 6.3 6.3   RBC 4.20 4.09 4.44   HGB 12.8 12.8 13.7   HCT 39.9 37.7 41.5    316 311     Basic Metabolic Panel    Recent Labs   Lab 01/07/24 0311 01/06/24  1732 01/06/24  1231 01/06/24  0727 01/06/24  0505 01/05/24  0753 01/05/24  0636     --   --   --  141  --  140   POTASSIUM 4.3  --   --   --  4.4  --  4.0   CHLORIDE 104  --   --   --  105  --  106   CO2 28  --   --   --  30*  --  24   BUN 19.4  --   --   --  13.8  --  8.8   CR 0.73  --   --   --  0.75  --  0.62   GLC 89 92 95   < > 91   < > 85   PB 8.7*  --   --   --  8.5*  --  8.5*    < > = values in this interval not displayed.     Liver Panel  Recent Labs   Lab 01/02/24 2356   PROTTOTAL 6.4   ALBUMIN 3.6   BILITOTAL 0.3   ALKPHOS 69   AST 18   ALT 14     INR    Recent Labs   Lab Test 01/02/24 2356 12/07/20  1550   INR 0.99 1      Lipid Profile    Recent Labs   Lab Test 01/03/24  0213 12/10/20  1113   CHOL 171  --    HDL 46*  --    *  --    TRIG 80 83     A1C    Recent Labs   Lab Test 01/02/24 2356   A1C 5.9*     Troponin    Recent Labs   Lab 01/07/24  0455 01/06/24  0047 01/05/24  1858   CTROPT 9 11 10          Data

## 2024-01-07 NOTE — PROVIDER NOTIFICATION
"\"SHERICE Lofton 6A rm 5254 Stroke    pt now reporting chest pain and I did not see 12 lead order placed earlier, so I am ordering one now for the new chest pain. ca Bowen 6A charge RN \"        Paged neuro resident at [phone]    "

## 2024-01-08 ENCOUNTER — APPOINTMENT (OUTPATIENT)
Dept: OCCUPATIONAL THERAPY | Facility: CLINIC | Age: 64
DRG: 065 | End: 2024-01-08
Payer: MEDICARE

## 2024-01-08 ENCOUNTER — APPOINTMENT (OUTPATIENT)
Dept: PHYSICAL THERAPY | Facility: CLINIC | Age: 64
DRG: 065 | End: 2024-01-08
Payer: MEDICARE

## 2024-01-08 LAB
ANION GAP SERPL CALCULATED.3IONS-SCNC: 8 MMOL/L (ref 7–15)
BACTERIA BLD CULT: NO GROWTH
BACTERIA BLD CULT: NO GROWTH
BUN SERPL-MCNC: 14.2 MG/DL (ref 8–23)
CALCIUM SERPL-MCNC: 8.8 MG/DL (ref 8.8–10.2)
CHLORIDE SERPL-SCNC: 103 MMOL/L (ref 98–107)
CREAT SERPL-MCNC: 0.71 MG/DL (ref 0.51–0.95)
DEPRECATED HCO3 PLAS-SCNC: 28 MMOL/L (ref 22–29)
EGFRCR SERPLBLD CKD-EPI 2021: >90 ML/MIN/1.73M2
ERYTHROCYTE [DISTWIDTH] IN BLOOD BY AUTOMATED COUNT: 12.5 % (ref 10–15)
GLUCOSE SERPL-MCNC: 86 MG/DL (ref 70–99)
HCT VFR BLD AUTO: 41.6 % (ref 35–47)
HGB BLD-MCNC: 13.9 G/DL (ref 11.7–15.7)
MCH RBC QN AUTO: 31.2 PG (ref 26.5–33)
MCHC RBC AUTO-ENTMCNC: 33.4 G/DL (ref 31.5–36.5)
MCV RBC AUTO: 94 FL (ref 78–100)
PLATELET # BLD AUTO: 379 10E3/UL (ref 150–450)
POTASSIUM SERPL-SCNC: 4.2 MMOL/L (ref 3.4–5.3)
RBC # BLD AUTO: 4.45 10E6/UL (ref 3.8–5.2)
SODIUM SERPL-SCNC: 139 MMOL/L (ref 135–145)
WBC # BLD AUTO: 6.8 10E3/UL (ref 4–11)

## 2024-01-08 PROCEDURE — 120N000002 HC R&B MED SURG/OB UMMC

## 2024-01-08 PROCEDURE — 250N000013 HC RX MED GY IP 250 OP 250 PS 637

## 2024-01-08 PROCEDURE — 250N000011 HC RX IP 250 OP 636

## 2024-01-08 PROCEDURE — 97535 SELF CARE MNGMENT TRAINING: CPT | Mod: GO

## 2024-01-08 PROCEDURE — 36415 COLL VENOUS BLD VENIPUNCTURE: CPT

## 2024-01-08 PROCEDURE — 97112 NEUROMUSCULAR REEDUCATION: CPT | Mod: GP

## 2024-01-08 PROCEDURE — 80048 BASIC METABOLIC PNL TOTAL CA: CPT

## 2024-01-08 PROCEDURE — 99232 SBSQ HOSP IP/OBS MODERATE 35: CPT | Mod: GC | Performed by: PSYCHIATRY & NEUROLOGY

## 2024-01-08 PROCEDURE — 97530 THERAPEUTIC ACTIVITIES: CPT | Mod: GO

## 2024-01-08 PROCEDURE — 97530 THERAPEUTIC ACTIVITIES: CPT | Mod: GP

## 2024-01-08 PROCEDURE — 85014 HEMATOCRIT: CPT

## 2024-01-08 PROCEDURE — 97116 GAIT TRAINING THERAPY: CPT | Mod: GP

## 2024-01-08 RX ADMIN — OXYCODONE HYDROCHLORIDE 5 MG: 5 SOLUTION ORAL at 07:46

## 2024-01-08 RX ADMIN — PREGABALIN 50 MG: 25 CAPSULE ORAL at 19:41

## 2024-01-08 RX ADMIN — APIXABAN 5 MG: 5 TABLET, FILM COATED ORAL at 19:41

## 2024-01-08 RX ADMIN — ROSUVASTATIN CALCIUM 20 MG: 20 TABLET, FILM COATED ORAL at 07:46

## 2024-01-08 RX ADMIN — OXYCODONE HYDROCHLORIDE 5 MG: 5 SOLUTION ORAL at 17:54

## 2024-01-08 RX ADMIN — OXYCODONE HYDROCHLORIDE 5 MG: 5 SOLUTION ORAL at 22:00

## 2024-01-08 RX ADMIN — PROCHLORPERAZINE EDISYLATE 10 MG: 5 INJECTION INTRAMUSCULAR; INTRAVENOUS at 23:58

## 2024-01-08 RX ADMIN — NICOTINE 1 PATCH: 21 PATCH, EXTENDED RELEASE TRANSDERMAL at 07:46

## 2024-01-08 RX ADMIN — ASPIRIN 81 MG CHEWABLE TABLET 81 MG: 81 TABLET CHEWABLE at 07:46

## 2024-01-08 RX ADMIN — APIXABAN 5 MG: 5 TABLET, FILM COATED ORAL at 07:46

## 2024-01-08 RX ADMIN — ACETAMINOPHEN 650 MG: 325 TABLET, FILM COATED ORAL at 13:44

## 2024-01-08 RX ADMIN — OXYCODONE HYDROCHLORIDE 5 MG: 5 SOLUTION ORAL at 13:44

## 2024-01-08 RX ADMIN — ACETAMINOPHEN 650 MG: 325 TABLET, FILM COATED ORAL at 17:54

## 2024-01-08 RX ADMIN — PREGABALIN 50 MG: 25 CAPSULE ORAL at 07:46

## 2024-01-08 RX ADMIN — ACETAMINOPHEN 650 MG: 325 TABLET, FILM COATED ORAL at 07:21

## 2024-01-08 RX ADMIN — ACETAMINOPHEN 650 MG: 325 TABLET, FILM COATED ORAL at 22:00

## 2024-01-08 ASSESSMENT — ACTIVITIES OF DAILY LIVING (ADL)
ADLS_ACUITY_SCORE: 32
ADLS_ACUITY_SCORE: 32
ADLS_ACUITY_SCORE: 33
ADLS_ACUITY_SCORE: 33
ADLS_ACUITY_SCORE: 32
ADLS_ACUITY_SCORE: 33
ADLS_ACUITY_SCORE: 32
ADLS_ACUITY_SCORE: 32
ADLS_ACUITY_SCORE: 33
ADLS_ACUITY_SCORE: 33

## 2024-01-08 NOTE — PLAN OF CARE
Goal Outcome Evaluation: 1900-0700      Plan of Care Reviewed With: patient    Overall Patient Progress: no changeOverall Patient Progress: no change    Status: Pt admitted from ED after a fall and L hemibody weakness, found to have subacute R PCA territory infarcts, possible UTI, and L distal non-displaced ulnar fracture.   Vitals: VSS on RA, on cardiac monitor with sinus rhythm.  Neuros: A&O x4. Cooperative with cares, and can have a very labile mood and tangential. Strengths 4/5 ex LUE 3/5. Decreased sensation to L side of body. L field cut. Intermittent reports of  dizziness, pt believes it is from gabapentin which has been discontinued.   IV: PIV SL x2.   Resp/trach: LS clear. Denies SOB.   Diet: Regular, fair intake.   Bowel status: BS+. LBM 1/5.   : Voids spontaneously.   Skin: Splint to LUE. NWB on LUE  Pain: mod- severe pain in LUE and left ribs when moving, wants pain medication when she's up in the morning.   Activity: Assist of one, GB/W. Pt unsteady d/t dizziness.   Plan: SW helping with discharge placement, now pursuing a medical respite shelter.        Stroke Patients:   Required education completed: stroke booklet given, not yet completed  Pneumoboots in place: pt refused

## 2024-01-08 NOTE — PLAN OF CARE
Goal Outcome Evaluation:      Plan of Care Reviewed With: patient    Overall Patient Progress: no change    [id number]  Status: Pt admitted from ED after a fall and L hemibody weakness, found to have subacute R PCA territory infarcts, possible UTI, and L distal non-displaced ulnar fracture.   Vitals: VSS on RA ex HTN within parameter. Intermittent irregular HR, team aware.   Neuros: A&O x4. Cooperative with cares, and was in good mood this shift, but can have a very labile mood. Pt frequently agitated by staff, tangential, and fixates on things unrelated to the topic at hand. Strengths 4/5 ex LUE 3/5. Decreased sensation to L side of body. L field cut. Dizziness, pt believes it is from gabapentin which has been discontinued.   IV: PIV SL x2.   Labs/Electrolytes: WDL.   Resp/trach: LS clear. Denies SOB.   Diet: Regular, fair intake.   Bowel status: BS+. LBM 1/5.   : Voids spontaneously.   Skin: Splint to LUE.   Pain: PRN Tylenol and oxy given for severe pain in LUE and left ribs, partially effective.   Activity: Assist of one, GB/W. Pt unsteady d/t dizziness.   Social: Family at bedside this afternoon, supportive of pt.   Plan: SW helping with discharge placement, now pursuing a medical respite shelter.   Updates this shift: Was up walking around unit this shift with writer.     Stroke Patients:   Required education completed: stroke booklet given, not yet completed  Pneumoboots in place: pt refused

## 2024-01-08 NOTE — PROGRESS NOTES
CHW sent referral to [facility].   CHW sent email to Sabina Carey and Karina Radford at 11:41 am.   [facility] cannot take pt until she is independent with mobility.   See SW note for more information.      Patricia Brown   6A/B/C Community Health Worker   Phone: [phone]

## 2024-01-08 NOTE — PLAN OF CARE
Status: Pt admitted from ED after a fall and L hemibody weakness, found to have subacute R PCA territory infarcts, possible UTI, and L distal non-displaced ulnar fracture.   Vitals: VSS on RA ex HTN within parameter. Intermittent irregular HR, team aware. CCM, cont pulse ox.   Neuros: A&O x4. Cooperative with cares, but very labile mood. Pt frequently agitated by staff, tangential, and fixates on things unrelated to the topic at hand. Strengths 4/5 ex LUE 3/5. Decreased sensation to L side of body. L field cut. Dizziness, pt believes it is from gabapentin which has been discontinued.   IV: PIV SL x2.   Labs/Electrolytes: WDL.   Resp/trach: LS clear. Denies SOB.   Diet: Regular, fair intake.   Bowel status: BS+. LBM 1/5.   : Voids spontaneously.   Skin: Splint to LUE - NWB.  Pain: PRN Tylenol and oxy given for severe pain in LUE and left ribs, partially effective.   Activity: Assist of one, GB. Pt unsteady d/t dizziness.   Social: Daughter, Nichole, called for updates.   Plan: Medically ready for discharge, SW helping with placement.  Updates this shift: Pt worked with therapies this shift.      Stroke Patients:   Required education completed: stroke booklet given, not yet completed  Pneumoboots in place: pt refused

## 2024-01-08 NOTE — PROGRESS NOTES
[facility]    Stroke Progress Note    Interval Events  -endorses pain all over, hyperesthesia in scattered parts of body. Asking for next pain med dose  -denies chest pain, abdominal pain  -switched from gabapentin to pregabalin yesterday. Unsure if it is effective  -ambulated around funes yesterday  -Medically ready for discharge. Patient aware we are looking into placement    HPI Summary  Louann Lofton is a 63 year old woman with history of HLD, tobacco use disorder, ischemic cardiomyopathy with LV thrombus for which she completed 3 months of warfarin, atrial fibrillation not on anticoagulation, migraine without aura, homelessness, Nissen fundlopication 2009 with chronic abdominal pain, and history of cognitive impairment? who presents with subacute L hemibody weakness with an acute fall, found to have subacute R PCA territory infarcts, possible UTI, and L distal non-displaced ulnar fracture.      Stroke Evaluation Summarized    MRI/Head CT CT Head   IMPRESSION:  1.  New transcortical region of hypodensity identified involving the right occipital lobe with focal hypodensity involving the right thalamus. Findings are age-indeterminate, but concerning for a late acute to subacute ischemic injury within the right   PCA territory.   2.  Age-indeterminate bilateral inferior cerebellar lacunar-type infarctions, new since prior exam.  3.  Chronic left cerebellar infarct.  4.  No hemorrhage.     MRI Brain with and without contrast      IMPRESSION:  1.  Late acute versus subacute right PCA territory infarcts, as above. No hemorrhagic conversion.  2.  Chronic senescent and ischemic changes, as above.   Intracranial Vasculature IMPRESSION:   HEAD CTA:   1.  No definite proximal large vessel occlusion identified.  2.  Severe short segment stenosis/narrowing of the distal right P2 posterior cerebral artery with faint opacification of the right P3/P4 posterior cerebral  arteries beyond this finding, possibly related to a subocclusive thrombus.   3.  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 Follow-up Neuro Interventional Radiology consultation is recommended.  4.  Mild to moderate stenosis of the right cerebral clinoid ICA.      Cervical Vasculature NECK CTA:  1.  Atheromatous disease, as above. No high-grade stenosis or dissection.  2.  Low-density thyroid nodule measuring up to 2 cm. Recommend non-emergent thyroid function testing and thyroid ultrasound for further assessment.      Echocardiogram Left ventricular wall thickness is normal. Left ventricular size is normal.  Biplane LVEF is 30%. Large 3.8x1.8x1.2 cm LV apical thrombus noted. LAD  territory akinesis.     EKG/Telemetry Sinus rhythm with short NM interval, R atrial enlargement possible inferior infarct    Other Testing Not Applicable       LDL  1/3/2024: 109 mg/dL   A1C  1/2/2024: 5.9 %   Troponin 1/3/2024: 13 ng/L        Impression   Louann Lofton is a 63 year old woman with history of HLD, polysubstance use disorder, ischemic cardiomyopathy with LV thrombus, atrial fibrillation not on anticoagulation, migraine without aura, homelessness, and history of cognitive impairment? who presents with subacute L hemibody weakness and sensory loss with recent fall, found to have subacute R PCA territory infarcts, possible UTI, and L distal non-displaced ulnar fracture. Urine drug screen positive for amphetamines and cannabinoids. Initial NIHSS 6 in setting of L hemibody weakness and sensory loss with extinction, as well as LUQ homonymous quadrantanopia.     CT/CTA with evidence of subacute R PCA territory infarcts, concern for severe stenosis v.s. subocclusive thrombus of the R P2, and incidental L A2/3 4mm aneurysm. MRI confirms subacute infarction in right PCA territory. TTE revealed large LV thrombus and newly decreased EF. Not a candidate for thrombolysis given outside the time window and  established infarctions on imaging.  Not a candidate for thrombectomy given no proximal large vessel occlusion and subacute presentation of symptoms.     Mechanism of stroke cardioembolic from LV thrombus. Given newly reduced EF (30%, previously 48%), is possible that she had another MI leading to stagnation of blood and ultimately thrombus formation. Initially on heparin gtt, now therapeutic. Would ideally bridge to Warfarin, but have concerns about compliance and outpatient follow up for INR monitoring. Anticoagulating on eliquis    Plan  #Subacute R PCA territory infarcts, suspected etiology: cardioembolism from LV thrombus  #Severe stenosis v.s. subocclusive thrombus of R P2  #Incidental L A2/3 4mm aneurysm  - Admit to Neurology  - Neurochecks Q 4 hours  - BP goal normotension, anti-hypertensives for SBP > 180  - Avoid hypotonic IV fluids  - Telemetry, EKG  - Bedside Glucose Monitoring  - PT/OT/SLP  - Stroke Education  - Encourage Smoking Cessation  - Depression Screen  - Apnea Screen  - Apixaban 5 mg BID  - ASA 81 mg daily  - Crestor 20 mg  - Lyrica 50 mg BID     #Demand ischemia   #Hx HTN   #Hx a-fib  #HLD  Troponin leak likely secondary to amphetamine use v.s. pain from L ulnar fracture v.s. subacute stroke.Troponin peaked at 15.  -Pharmacy med rec consult   -Eliquis 5 mg BID  -Crestor 20 mg  -Holding Metoprolol tartrate 12.5 mg BID (given bradycardia)     #Non-displaced distal L ulnar fracture   - Oxycodone 5 mg q4hr prn for pain  - Ortho consult, appreciate recommendations              - Re-splinted  - Follow up in clinic in one week for repeat A/P and lateral x-rays              - Non-weight bearing LUE     #UTI, resolved  Not symptomatic. Urine culture growing E. Coli. Completed 3 day course of CTX     #Headache, resolved  - Magnesium 2 g IV once  - Compazine 10 mg q6 hr prn  - Benadryl 25 mg q6 hr prn     #Amphetamine use , ongoing  #Polysubstance Use Disorder  -Declines use, declines addiction medicine  "consult     #Hx tobacco use   -Nicotine patch      #Homelessness   -Social work consult  - Medically ready for discharge. Appreciate SW assistance with dispo planning.      Prophylaxis            For VTE Prevention:  - eliquis     For Acid Suppression:  - None     Patient Follow-up    - Neuro-ophthalmology follow up  - Stroke EFE follow up   - Cardiology follow up  - Orthopedic follow up in 1 week (1/10/24) for repeat X-rays  - Outpatient CTA head and neck in 1 year to assess for aneurysm stability        The patient was discussed with Dr. Castro.    Nereida Daly MD  Neurology Resident  Pager:  [phone]  ______________________________________________________    Clinically Significant Risk Factors                  # Hypertension: Noted on problem list        # Cachexia: Estimated body mass index is 17.71 kg/m  as calculated from the following:    Height as of this encounter: 1.6 m (5' 3\").    Weight as of this encounter: 45.4 kg (100 lb).      # Asthma: noted on problem list          Medications   Scheduled Meds   apixaban ANTICOAGULANT  5 mg Oral BID    aspirin  81 mg Oral Daily    [Held by provider] metoprolol tartrate  12.5 mg Oral BID    nicotine  1 patch Transdermal Daily    nicotine   Transdermal Q8H    nicotine   Transdermal Q8H    pregabalin  50 mg Oral BID    rosuvastatin  20 mg Oral Daily    sodium chloride (PF)  3 mL Intracatheter Q8H       Infusion Meds   - MEDICATION INSTRUCTIONS -         PRN Meds  acetaminophen, diphenhydrAMINE, labetalol **OR** hydrALAZINE, lidocaine 4%, lidocaine (buffered or not buffered), meclizine, - MEDICATION INSTRUCTIONS -, ondansetron **OR** ondansetron, oxyCODONE, prochlorperazine, prochlorperazine **OR** prochlorperazine **OR** prochlorperazine, sodium chloride (PF)       PHYSICAL EXAMINATION  Temp:  [98  F (36.7  C)-98.7  F (37.1  C)] 98  F (36.7  C)  Pulse:  [58-71] 66  Resp:  [12-15] 12  BP: (117-157)/(67-99) 140/80  SpO2:  [94 %-99 %] 98 %      General Exam  General:  " patient lying in bed without any acute distress    HEENT:  normocephalic/atraumatic  Cardio:  irregularly irregular on cardiac monitor  Pulmonary:  no respiratory distress  Abdomen:  soft  Extremities:  no edema  Skin:  dry cool in lower extremities, no edema/clubbing.    Neuro Exam  Mental Status:  alert, oriented x 3, follows commands, speech clear and fluent  Cranial Nerves:  EOMI with normal smooth pursuit, facial movements symmetric, hearing not formally tested but intact to conversation, no dysarthria, shoulder shrug strong bilaterally, tongue protrusion midline. VF intact aside from LUQ vision loss. No nystagmus. Endorsed decreased sensation to light touch in v1-v3 distribution on left side of face  Motor:  no abnormal movements, able to move all limbs spontaneously, no pronator drift. Left hip flexion 4/5, right hip flexion 4+/5  Reflexes:  no clonus  Sensory:  light touch sensation diminished in left upper and lower extremities compared to right. no extinction on double simultaneous stimulation   Coordination:  normal finger-to-nose and heel-to-shin bilaterally without dysmetria  Station/Gait:  deferred    Stroke Scales    NIHSS  1a. Level of Consciousness 0-->Alert, keenly responsive   1b. LOC Questions 0-->Answers both questions correctly   1c. LOC Commands 0-->Performs both tasks correctly   2.   Best Gaze 0-->Normal   3.   Visual 1-->Partial hemianopia (visual loss in LUQ)   4.   Facial Palsy 0-->Normal symmetrical movements   5a. Motor Arm, Left 2-->Some effort against gravity, limb cannot get to or maintain (if cued) 90 (or 45) degrees, drifts down to bed, but has some effort against gravity (exam limited to extreme pain)   5b. Motor Arm, Right 0-->No drift, limb holds 90 (or 45) degrees for full 10 secs   6a. Motor Leg, Left 1-->Drift, leg falls by the end of the 5-sec period but does not hit bed   6b. Motor Leg, right 1-->Drift, leg falls by the end of the 5-sec period but does not hit bed   7.    Limb Ataxia 0-->Absent   8.   Sensory 1-->Mild-to-moderate sensory loss, patient feels pinprick is less sharp or is dull on the affected side, or there is a loss of superficial pain with pinprick, but patient is aware of being touched (reduced sensation on left side face v1-v3. reducted sensation LUE compared to RUE, reduced on LLE compared to RLE (20% vs 100%))   9.   Best Language 0-->No aphasia, normal   10. Dysarthria 0-->Normal   11. Extinction and Inattention  0-->No abnormality   Total 6 (01/08/24 0812)           Imaging  I personally reviewed all imaging; relevant findings per HPI.     Lab Results Data   CBC  Recent Labs   Lab 01/08/24  0700 01/07/24  0311 01/06/24  0505   WBC 6.8 6.5 6.3   RBC 4.45 4.20 4.09   HGB 13.9 12.8 12.8   HCT 41.6 39.9 37.7    331 316     Basic Metabolic Panel    Recent Labs   Lab 01/08/24  0700 01/07/24  0311 01/06/24  1732 01/06/24  0727 01/06/24  0505    140  --   --  141   POTASSIUM 4.2 4.3  --   --  4.4   CHLORIDE 103 104  --   --  105   CO2 28 28  --   --  30*   BUN 14.2 19.4  --   --  13.8   CR 0.71 0.73  --   --  0.75   GLC 86 89 92   < > 91   PB 8.8 8.7*  --   --  8.5*    < > = values in this interval not displayed.     Liver Panel  Recent Labs   Lab 01/02/24  2356   PROTTOTAL 6.4   ALBUMIN 3.6   BILITOTAL 0.3   ALKPHOS 69   AST 18   ALT 14     INR    Recent Labs   Lab Test 01/02/24  2356 12/07/20  1550   INR 0.99 1      Lipid Profile    Recent Labs   Lab Test 01/03/24  0213 12/10/20  1113   CHOL 171  --    HDL 46*  --    *  --    TRIG 80 83     A1C    Recent Labs   Lab Test 01/02/24  2356   A1C 5.9*     Troponin    Recent Labs   Lab 01/07/24  0455 01/06/24  0047 01/05/24  1858   CTROPT 9 11 10          Data
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
## 2024-01-09 ENCOUNTER — APPOINTMENT (OUTPATIENT)
Dept: OCCUPATIONAL THERAPY | Facility: CLINIC | Age: 64
DRG: 065 | End: 2024-01-09
Payer: MEDICARE

## 2024-01-09 ENCOUNTER — APPOINTMENT (OUTPATIENT)
Dept: GENERAL RADIOLOGY | Facility: CLINIC | Age: 64
DRG: 065 | End: 2024-01-09
Payer: MEDICARE

## 2024-01-09 ENCOUNTER — APPOINTMENT (OUTPATIENT)
Dept: PHYSICAL THERAPY | Facility: CLINIC | Age: 64
DRG: 065 | End: 2024-01-09
Payer: MEDICARE

## 2024-01-09 LAB
ANION GAP SERPL CALCULATED.3IONS-SCNC: 8 MMOL/L (ref 7–15)
BUN SERPL-MCNC: 17.3 MG/DL (ref 8–23)
CALCIUM SERPL-MCNC: 8.7 MG/DL (ref 8.8–10.2)
CHLORIDE SERPL-SCNC: 105 MMOL/L (ref 98–107)
CREAT SERPL-MCNC: 0.69 MG/DL (ref 0.51–0.95)
DEPRECATED HCO3 PLAS-SCNC: 27 MMOL/L (ref 22–29)
EGFRCR SERPLBLD CKD-EPI 2021: >90 ML/MIN/1.73M2
ERYTHROCYTE [DISTWIDTH] IN BLOOD BY AUTOMATED COUNT: 12.5 % (ref 10–15)
GLUCOSE SERPL-MCNC: 81 MG/DL (ref 70–99)
HCT VFR BLD AUTO: 42.3 % (ref 35–47)
HGB BLD-MCNC: 14 G/DL (ref 11.7–15.7)
MAGNESIUM SERPL-MCNC: 2.1 MG/DL (ref 1.7–2.3)
MCH RBC QN AUTO: 31 PG (ref 26.5–33)
MCHC RBC AUTO-ENTMCNC: 33.1 G/DL (ref 31.5–36.5)
MCV RBC AUTO: 94 FL (ref 78–100)
PLATELET # BLD AUTO: 386 10E3/UL (ref 150–450)
POTASSIUM SERPL-SCNC: 4 MMOL/L (ref 3.4–5.3)
RBC # BLD AUTO: 4.52 10E6/UL (ref 3.8–5.2)
SODIUM SERPL-SCNC: 140 MMOL/L (ref 135–145)
WBC # BLD AUTO: 5.8 10E3/UL (ref 4–11)

## 2024-01-09 PROCEDURE — 36415 COLL VENOUS BLD VENIPUNCTURE: CPT

## 2024-01-09 PROCEDURE — 258N000003 HC RX IP 258 OP 636

## 2024-01-09 PROCEDURE — 93005 ELECTROCARDIOGRAM TRACING: CPT

## 2024-01-09 PROCEDURE — 250N000013 HC RX MED GY IP 250 OP 250 PS 637

## 2024-01-09 PROCEDURE — 97530 THERAPEUTIC ACTIVITIES: CPT | Mod: GO

## 2024-01-09 PROCEDURE — 90832 PSYTX W PT 30 MINUTES: CPT | Performed by: STUDENT IN AN ORGANIZED HEALTH CARE EDUCATION/TRAINING PROGRAM

## 2024-01-09 PROCEDURE — 97116 GAIT TRAINING THERAPY: CPT | Mod: GP | Performed by: PHYSICAL THERAPIST

## 2024-01-09 PROCEDURE — 85027 COMPLETE CBC AUTOMATED: CPT

## 2024-01-09 PROCEDURE — 97530 THERAPEUTIC ACTIVITIES: CPT | Mod: GP | Performed by: PHYSICAL THERAPIST

## 2024-01-09 PROCEDURE — 93010 ELECTROCARDIOGRAM REPORT: CPT | Performed by: INTERNAL MEDICINE

## 2024-01-09 PROCEDURE — 73090 X-RAY EXAM OF FOREARM: CPT | Mod: 26 | Performed by: RADIOLOGY

## 2024-01-09 PROCEDURE — 97535 SELF CARE MNGMENT TRAINING: CPT | Mod: GO

## 2024-01-09 PROCEDURE — 80048 BASIC METABOLIC PNL TOTAL CA: CPT

## 2024-01-09 PROCEDURE — 73090 X-RAY EXAM OF FOREARM: CPT | Mod: LT

## 2024-01-09 PROCEDURE — 73110 X-RAY EXAM OF WRIST: CPT | Mod: 26 | Performed by: RADIOLOGY

## 2024-01-09 PROCEDURE — 120N000002 HC R&B MED SURG/OB UMMC

## 2024-01-09 PROCEDURE — 83735 ASSAY OF MAGNESIUM: CPT

## 2024-01-09 PROCEDURE — 99232 SBSQ HOSP IP/OBS MODERATE 35: CPT | Mod: GC | Performed by: PSYCHIATRY & NEUROLOGY

## 2024-01-09 PROCEDURE — 250N000011 HC RX IP 250 OP 636

## 2024-01-09 PROCEDURE — 73110 X-RAY EXAM OF WRIST: CPT | Mod: LT

## 2024-01-09 PROCEDURE — 99222 1ST HOSP IP/OBS MODERATE 55: CPT | Mod: GC | Performed by: STUDENT IN AN ORGANIZED HEALTH CARE EDUCATION/TRAINING PROGRAM

## 2024-01-09 RX ADMIN — APIXABAN 5 MG: 5 TABLET, FILM COATED ORAL at 08:21

## 2024-01-09 RX ADMIN — SODIUM CHLORIDE, POTASSIUM CHLORIDE, SODIUM LACTATE AND CALCIUM CHLORIDE 500 ML: 600; 310; 30; 20 INJECTION, SOLUTION INTRAVENOUS at 15:47

## 2024-01-09 RX ADMIN — PREGABALIN 50 MG: 25 CAPSULE ORAL at 20:33

## 2024-01-09 RX ADMIN — ACETAMINOPHEN 650 MG: 325 TABLET, FILM COATED ORAL at 20:33

## 2024-01-09 RX ADMIN — ACETAMINOPHEN 650 MG: 325 TABLET, FILM COATED ORAL at 05:34

## 2024-01-09 RX ADMIN — ONDANSETRON 4 MG: 4 TABLET, ORALLY DISINTEGRATING ORAL at 14:02

## 2024-01-09 RX ADMIN — OXYCODONE HYDROCHLORIDE 5 MG: 5 SOLUTION ORAL at 05:34

## 2024-01-09 RX ADMIN — APIXABAN 5 MG: 5 TABLET, FILM COATED ORAL at 20:33

## 2024-01-09 RX ADMIN — OXYCODONE HYDROCHLORIDE 5 MG: 5 SOLUTION ORAL at 20:33

## 2024-01-09 RX ADMIN — NICOTINE 1 PATCH: 21 PATCH, EXTENDED RELEASE TRANSDERMAL at 08:22

## 2024-01-09 RX ADMIN — ROSUVASTATIN CALCIUM 20 MG: 20 TABLET, FILM COATED ORAL at 08:21

## 2024-01-09 RX ADMIN — ASPIRIN 81 MG CHEWABLE TABLET 81 MG: 81 TABLET CHEWABLE at 08:21

## 2024-01-09 RX ADMIN — OXYCODONE HYDROCHLORIDE 5 MG: 5 SOLUTION ORAL at 14:52

## 2024-01-09 RX ADMIN — PREGABALIN 50 MG: 25 CAPSULE ORAL at 08:21

## 2024-01-09 RX ADMIN — ACETAMINOPHEN 650 MG: 325 TABLET, FILM COATED ORAL at 14:52

## 2024-01-09 ASSESSMENT — ACTIVITIES OF DAILY LIVING (ADL)
ADLS_ACUITY_SCORE: 33

## 2024-01-09 NOTE — PROVIDER NOTIFICATION
Louann Lofton 1037    FYI, pt.'s SBP dropped to 97/58, MAP 70.   pt is c/o a HA, denied any dizziness.    Thanks,  KATIANA Obrien. [phone]    Addendum  Paged returned.   PRN Compazine given for HA and nausea. Cod packs applied to forehead.   Pt continued to deny light headedness.   SBP back to baseline @ 0030 (134/71)

## 2024-01-09 NOTE — PROGRESS NOTES
Care Management Follow Up     Length of Stay (days): 6     Expected Discharge Date:   Pt medically ready for discharge as of 1/6/2024     Concerns to be Addressed:     Disposition planning  Patient plan of care discussed at interdisciplinary rounds: Yes     Anticipated Discharge Disposition:   TCU placement     Anticipated Discharge Services:   TCU placement  Anticipated Discharge DME:    Not applicable at this time     Patient/family educated on Medicare website which has current facility and service quality ratings:   No, as options will be limited due to pt's positive urine drug screen  Education Provided on the Discharge Plan:  Yes  Patient/Family in Agreement with the Plan:  Yes     Referrals Placed by CM/SW:   Post acute care facility's  Private pay costs discussed: Not applicable at this time     Additional Information:  SW is following pt for discharge planning.  Per Dr. Cota and Dr. Daly, pt is medically ready for discharge.  Pt was seen on 1/6/2024 by Physical Therapy and discharge to home was recommended.    OT is recommending a TCU stay.   HECTOR spoke with Sabina from Physical Therapy who states that she will treat pt this afternoon but add's that pt will need Physical Therapy if pt is in a facility setting.  OT states that they completed a MoCA today and pt scored 20/30 indicating mild cognitive impairment.  HECTOR met with pt and updated.  Pt is agreeable to a TCU stay in a community SNF.  SW informed pt that her daughter (Nichole Mattson [phone]) had called to request a discharge planning update.  Pt gave verbal permission for this SW to speak with Nichole Mattson in regards to discharge planning.  Pt states that her dog is at her ex-husbands.  Pt states that it would be nice if one of her children or grandchildren would be willing to house and care for the dog and pt asked this SW to relay this to her daughter.  SW phoned pt's daughter (Nichole Mattson).  SW informed Nichole Mattson that pt did not  "qualify for [facility] as pt is not yet indep with mobility. HECTOR informed Nichole Mattson that a TCU stay is recommended and referrals will be made to community SNF's.  Nichole Mattson voiced understanding and agreement.  HECTOR relayed pt's request as it pertains to pt's dog and Nichole Mattson states that she is working on a plan for pt's dog.  Nichole Mattson states that pt will feel better if she feels good about the plan for the dog.      HECTOR completed a  \"Bed Reservation Request\" form that was submitted to Care Management Dept Management team to request consideration for admit [facility] [place].    SW referred pt to [facility].    HECTOR sent Epic referrals to the following TCU's/SNF's:  - [facility]  - [facility]  - [facility]  - and to [facility] Admissions for consideration at : [facility] [place], [facility] [place], [facility] [place], [facility] [place] [place], [facility] [place], [facility], [facility] [place], [facility] [place],  [facility] [place], [facility] [place], [facility] [place], [facility] [place] [place] [place] [place], [facility] [place], [facility] [place] and [facility] [place].        HECTOR will continue to follow for discharge planning.    JOLENE Mckeon  Social Work, 6A  Phone:  [phone]  Pager:  [phone]  1/9/2024        SW will continue to follow pt for discharge planning.     "

## 2024-01-09 NOTE — PLAN OF CARE
"Goal Outcome Evaluation:      Plan of Care Reviewed With: patient    Overall Patient Progress: improvingOverall Patient Progress: improving    Status: Admitted from ED after a fall and L hemibody weakness, found to have subacute R PCA territory infarcts, possible UTI, and L distal non-displaced ulnar fracture.    Vitals: VSS on RA, expt BP was intermittently soft during shift SBP <94/49 (MAP 64)--MD notified, on CCM, on Cont pulse ox  Neuros: A&Ox4, strengths LUE 3/5, 4/5 t/o, c/o decreased sensation to L side of body at baseline, L field cut, pt stated gabapentin makes her dizzy--writer reassured that it was discontinued, NIHSS 6  IV: 2 R PIV SL  Labs/Electrolytes: WNL  Resp/trach: on RA  Diet: Regular diet  Bowel status: Had a BM this shift.   : voids spont  Skin: Splint to L hand  Pain: PRN tylenol x2, oxy 2x, given for LUE pain--not as effective per pt  Activity: Ax1 with GB/walker  Social: no visitors, pt appeared to be very sad and more teary this shift. Cried in between cares.   Plan: see SW notes, medically ready for discharge  Updates this shift: pt asked if she could have medical marijuana for pain management instead, stating, \"oxycodone,tylenol does not help me much\" pt stated she will ask team in the morning. Charge aware. Pt`s SBP dropped to 94/49 (MAP 64), during that time, pt c/o of HA and nausea--IV compazine given which provided relief. BP returned to baseline 30mins later. Continue to monitor.     "

## 2024-01-09 NOTE — CONSULTS
SPIRITUAL HEALTH SERVICES - Consult Note   [facility] [address]    Referral Source/Reason for Visit: Routine Consult     Summary and Recommendations -   - Louann has distress over her dog and her belongings being left with her ex-.   - Louann is hoping to find a group home to live in.    Plan: I let Louann know how she could request spiritual care in the future.    Violeta Bianchi  Chaplain Resident  Pager [phone]    [facility] available 24/7 for emergent requests/referrals, either by paging the on-call  or by entering an ASAP/STAT consult in [employer], which will also page the on-call .      Assessment     Saw pt Louann Lofton per Routine Consult    Patient/Family Understanding of Illness and Goals of Care - Louann said that she had a stroke and showed me the stroke booklet she received.  She expressed being happy to have the booklet to reference so she could know what's normal and when she needs to seek medical attention.      Distress and Loss - Louann named a number of things causing her distress, but due to the scattered patterns of the conversation it was difficult to determine which aspects were connected to others.  Louann named her ex- as causing her distress.  Her dog, Jonatan, is currently at his house and she worries about the dog getting hurt there.  Louann also named that she is worried about finding  placement in a group home.  She mentioned distress over not being able to access medical marijuana in a group home.    Strengths, Coping, and Resources - Louann named that her daughters are supportive.    Meaning, Beliefs, and Spirituality - Not Discussed

## 2024-01-09 NOTE — PROGRESS NOTES
[facility]    Stroke Progress Note    Interval Events  -denies new pain including chest pain, abdominal pain  -still with sensory changes in left side of body  -denies SI/HI. Does not want to quit smoking but willing to talk to addiction medicine regarding cutting down  -aware we are still looking for placement    HPI Summary  Louann Lofton is a 63 year old woman with history of HLD, tobacco use disorder, ischemic cardiomyopathy with LV thrombus for which she completed 3 months of warfarin, atrial fibrillation not on anticoagulation, migraine without aura, homelessness, Nissen fundlopication 2009 with chronic abdominal pain, and history of cognitive impairment? who presents with subacute L hemibody weakness with an acute fall, found to have subacute R PCA territory infarcts, possible UTI, and L distal non-displaced ulnar fracture.      Stroke Evaluation Summarized    MRI/Head CT CT Head   IMPRESSION:  1.  New transcortical region of hypodensity identified involving the right occipital lobe with focal hypodensity involving the right thalamus. Findings are age-indeterminate, but concerning for a late acute to subacute ischemic injury within the right   PCA territory.   2.  Age-indeterminate bilateral inferior cerebellar lacunar-type infarctions, new since prior exam.  3.  Chronic left cerebellar infarct.  4.  No hemorrhage.     MRI Brain with and without contrast      IMPRESSION:  1.  Late acute versus subacute right PCA territory infarcts, as above. No hemorrhagic conversion.  2.  Chronic senescent and ischemic changes, as above.   Intracranial Vasculature IMPRESSION:   HEAD CTA:   1.  No definite proximal large vessel occlusion identified.  2.  Severe short segment stenosis/narrowing of the distal right P2 posterior cerebral artery with faint opacification of the right P3/P4 posterior cerebral arteries beyond this finding, possibly related to a subocclusive  thrombus.   3.  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 Follow-up Neuro Interventional Radiology consultation is recommended.  4.  Mild to moderate stenosis of the right cerebral clinoid ICA.      Cervical Vasculature NECK CTA:  1.  Atheromatous disease, as above. No high-grade stenosis or dissection.  2.  Low-density thyroid nodule measuring up to 2 cm. Recommend non-emergent thyroid function testing and thyroid ultrasound for further assessment.      Echocardiogram Left ventricular wall thickness is normal. Left ventricular size is normal.  Biplane LVEF is 30%. Large 3.8x1.8x1.2 cm LV apical thrombus noted. LAD  territory akinesis.      EKG/Telemetry Sinus rhythm with short ND interval, R atrial enlargement possible inferior infarct    Other Testing Not Applicable       LDL  1/3/2024: 109 mg/dL   A1C  1/2/2024: 5.9 %   Troponin 1/3/2024: 13 ng/L         Impression   Acute ischemic stroke of R PCA Territory infarcts due to cardioembolism from LV Thrombus  Louannfeng Lofton is a 63 year old woman with history of HLD, polysubstance use disorder, ischemic cardiomyopathy with LV thrombus, atrial fibrillation not on anticoagulation, migraine without aura, homelessness, and history of cognitive impairment? who presents with subacute L hemibody weakness and sensory loss with recent fall, found to have subacute R PCA territory infarcts, possible UTI, and L distal non-displaced ulnar fracture. Urine drug screen positive for amphetamines and cannabinoids. Initial NIHSS 6 in setting of L hemibody weakness and sensory loss with extinction, as well as LUQ homonymous quadrantanopia. Not a candidate for thrombolysis given outside the time window and established infarctions on imaging.  Not a candidate for thrombectomy given no proximal large vessel occlusion.     CT/CTA with evidence of subacute R PCA territory infarcts, concern for severe stenosis v.s. subocclusive thrombus of the R P2, and incidental L  A2/3 4mm aneurysm. MRI confirms subacute infarction. TTE revealed large LV thrombus and newly decreased EF.      Mechanism of stroke cardioembolic from LV thrombus. Given newly reduced EF (30%, previously 48%), is possible that she had another MI leading to stagnation of blood and ultimately thrombus formation. Initially on heparin gtt, now therapeutic. Would ideally bridge to Warfarin, but have concerns about compliance and outpatient follow up for INR monitoring. Anticoagulating on eliquis    Plan  #Subacute R PCA territory infarcts, suspected etiology: cardioembolism from LV thrombus  #Severe stenosis v.s. subocclusive thrombus of R P2  #Incidental L A2/3 4mm aneurysm  - Admit to Neurology  - Neurochecks Q 4 hours  - BP goal normotension, anti-hypertensives for SBP > 180  - Avoid hypotonic IV fluids  - Telemetry, EKG  - Bedside Glucose Monitoring  - PT/OT/SLP  - Stroke Education  - Encourage Smoking Cessation  - Depression Screen  - Apnea Screen  - Apixaban 5 mg BID  - ASA 81 mg daily  - Crestor 20 mg  - Lyrica 50 mg BID     #Demand ischemia   #Hx HTN   #Hx a-fib  #HLD  Troponin leak likely secondary to amphetamine use v.s. pain from L ulnar fracture v.s. subacute stroke.Troponin peaked at 15.  -Pharmacy med rec consult   -Eliquis 5 mg BID  -Crestor 20 mg  -Holding Metoprolol tartrate 12.5 mg BID (given bradycardia)     #Amphetamine use , ongoing  #Hx tobacco use   #Polysubstance Use Disorder  -Declines use, vape pen found in hospital room  -asked about medical marijuana use.  -agreed to speak with addiction med today.  -Nicotine patch     #Non-displaced distal L ulnar fracture   - Oxycodone 5 mg q4hr prn for pain  - Ortho consult, appreciate recommendations              - Re-splinted  - repeat A/P and lateral x-rays              - Non-weight bearing LUE     #UTI, resolved  Not symptomatic. Urine culture growing E. Coli. Completed 3 day course of CTX     #Headache, resolved  - Magnesium 2 g IV once  - Compazine  "10 mg q6 hr prn  - Benadryl 25 mg q6 hr prn    #Homelessness   -Social work consult  - Medically ready for discharge. Appreciate SW assistance with dispo planning.      Prophylaxis            For VTE Prevention:  - eliquis     For Acid Suppression:  - None     Patient Follow-up    - Neuro-ophthalmology follow up  - Stroke EFE follow up   - Cardiology follow up  - Orthopedic follow up  - Outpatient CTA head and neck in 1 year to assess for aneurysm stability         The patient was discussed with Dr. Castro.     Nereida Daly MD  Neurology Resident  Pager:  [phone]  ______________________________________________________    Clinically Significant Risk Factors                  # Hypertension: Noted on problem list        # Cachexia: Estimated body mass index is 17.71 kg/m  as calculated from the following:    Height as of this encounter: 1.6 m (5' 3\").    Weight as of this encounter: 45.4 kg (100 lb).      # Asthma: noted on problem list          Medications   Scheduled Meds   apixaban ANTICOAGULANT  5 mg Oral BID    aspirin  81 mg Oral Daily    [START ON 1/10/2024] influenza quadrivalent (PF) vacc  0.5 mL Intramuscular Prior to discharge    [Held by provider] metoprolol tartrate  12.5 mg Oral BID    nicotine  1 patch Transdermal Daily    nicotine   Transdermal Q8H    nicotine   Transdermal Q8H    pregabalin  50 mg Oral BID    rosuvastatin  20 mg Oral Daily    sodium chloride (PF)  3 mL Intracatheter Q8H       Infusion Meds   - MEDICATION INSTRUCTIONS -         PRN Meds  acetaminophen, diphenhydrAMINE, labetalol **OR** hydrALAZINE, lidocaine 4%, lidocaine (buffered or not buffered), meclizine, - MEDICATION INSTRUCTIONS -, ondansetron **OR** ondansetron, oxyCODONE, prochlorperazine, prochlorperazine **OR** prochlorperazine **OR** prochlorperazine, sodium chloride (PF)       PHYSICAL EXAMINATION  Temp:  [97.8  F (36.6  C)-99.1  F (37.3  C)] 98  F (36.7  C)  Pulse:  [58-88] 63  Resp:  [12-20] 15  BP: ()/(49-88) " 106/69  SpO2:  [97 %-100 %] 98 %      General Exam  General:  patient lying in bed without any acute distress    HEENT:  normocephalic/atraumatic, poor dental hygiene  Cardio:  RRR on bedside monitor  Pulmonary:  no respiratory distress  Abdomen:  soft, non-tender  Extremities:  no edema no clubbing. Dry cool skin. consuelo calf wasting  Skin:  intact, warm/dry     Neuro Exam  Mental Status:  alert, oriented x 3, follows commands, speech clear and fluent, naming and repetition normal  Cranial Nerves:  visual fields intact, EOMI with normal smooth pursuit, facial sensation intact and symmetric, facial movements symmetric, hearing not formally tested but intact to conversation, no dysarthria, shoulder shrug strong bilaterally, tongue protrusion midline  Motor:  normal muscle tone and bulk, no abnormal movements, able to move all limbs spontaneously. Mild drift in left UE, partially due to cast/pain but improved from yesterday.  Reflexes:  no clonus  Sensory:  light touch sensation intact throughout upper and lower extremities, greatly reduced on left UE and LE, left side extinction on double simultaneous stimulation   Coordination:  normal FNF and HS bilaterally without dysmetria  Station/Gait:  deferred, per nursing notes Ax1 with GB/walker  Psych: Irritable affect, guarded. Denies SI/HI    Stroke Scales    NIHSS  1a. Level of Consciousness 0-->Alert, keenly responsive   1b. LOC Questions 0-->Answers both questions correctly   1c. LOC Commands 0-->Performs both tasks correctly   2.   Best Gaze 0-->Normal   3.   Visual 1-->Partial hemianopia (LUQ vision loss)   4.   Facial Palsy 0-->Normal symmetrical movements   5a. Motor Arm, Left 1-->Drift, limb holds 90 (or 45) degrees, but drifts down before full 10 seconds, does not hit bed or other support   5b. Motor Arm, Right 0-->No drift, limb holds 90 (or 45) degrees for full 10 secs   6a. Motor Leg, Left 0-->No drift, leg holds 30 degree position for full 5 secs   6b. Motor  Leg, right 0-->No drift, leg holds 30 degree position for full 5 secs   7.   Limb Ataxia 0-->Absent   8.   Sensory 1-->Mild-to-moderate sensory loss, patient feels pinprick is less sharp or is dull on the affected side, or there is a loss of superficial pain with pinprick, but patient is aware of being touched (diminished sensation on left UE and LE)   9.   Best Language 0-->No aphasia, normal   10. Dysarthria 0-->Normal   11. Extinction and Inattention  1-->Visual, tactile, auditory, spatial, or personal inattention or extinction to bilateral simultaneous stimulation in one of the sensory modalities (unable to feel left side with double simultaneous stimulation)   Total 4 (01/09/24 0851)       Modified Ricky Score (Pre-morbid)  4-Moderately severe disability; unable to walk without assistance and unable to attend to own bodily    Imaging  I personally reviewed all imaging; relevant findings per HPI.     Lab Results Data   CBC  Recent Labs   Lab 01/09/24  0501 01/08/24  0700 01/07/24  0311   WBC 5.8 6.8 6.5   RBC 4.52 4.45 4.20   HGB 14.0 13.9 12.8   HCT 42.3 41.6 39.9    379 331     Basic Metabolic Panel    Recent Labs   Lab 01/09/24  0501 01/08/24  0700 01/07/24  0311    139 140   POTASSIUM 4.0 4.2 4.3   CHLORIDE 105 103 104   CO2 27 28 28   BUN 17.3 14.2 19.4   CR 0.69 0.71 0.73   GLC 81 86 89   PB 8.7* 8.8 8.7*     Liver Panel  Recent Labs   Lab 01/02/24  2356   PROTTOTAL 6.4   ALBUMIN 3.6   BILITOTAL 0.3   ALKPHOS 69   AST 18   ALT 14     INR    Recent Labs   Lab Test 01/02/24  2356 12/07/20  1550   INR 0.99 1      Lipid Profile    Recent Labs   Lab Test 01/03/24  0213 12/10/20  1113   CHOL 171  --    HDL 46*  --    *  --    TRIG 80 83     A1C    Recent Labs   Lab Test 01/02/24  2356   A1C 5.9*     Troponin    Recent Labs   Lab 01/07/24  0455 01/06/24  0047 01/05/24  1858   CTROPT 9 11 10          Data

## 2024-01-09 NOTE — CONSULTS
"  Health Psychology                                                                                                                          Meera Lawrence, Ph.D., L.P [phone]  Carey Ashton, Ph.D., L.P. [phone]  Cuca Irving, Ph.D, L..P. [phone]  Krystal Diaz, Ph.D., L.P. [phone]  Franklin Gibbs, Ph.D., A.B.P.P., L.P. [phone]         Tari Vega, Ph.D., L.P. [phone]       Carmen Stone, Ph.D., A.B.P.P., LP [phone]           [facility] [place], [address]rd Floor  [address] [place] [address]      Inpatient Health Psychology Consultation    Date of Service:  01/09/24    BACKGROUND:  Per EMR: \"Louann Lofton is a 63 year old woman with history of HLD, tobacco use disorder, ischemic cardiomyopathy with LV thrombus for which she completed 3 months of warfarin, atrial fibrillation not on anticoagulation, migraine without aura, homelessness, Nissen fundlopication 2009 with chronic abdominal pain, and history of cognitive impairment? who presents with subacute L hemibody weakness with an acute fall, found to have subacute R PCA territory infarcts, possible UTI, and L distal non-displaced ulnar fracture.\"      Health psychology consulted by Dr. Puente, with Stroke Service, to support Louann in coping with psychosocial and health-related stressors.     SUBJECTIVE:  Met with Louann to introduce Health Psychology services and discuss nature of referral. Louann expressed interest in meeting but did not seem to have a specific topic she wanted to discuss. Throughout the discussion we reviewed stressors related to her ex-partner and getting her things, managing headaches/possible ringing in her ears (?). She was interested in information about managing stress and anxiety and we discussed specific relaxation skills she can use. She was able to demonstrate relaxed breathing that she already does. Validated her emotional experiences and " engaged in problem solving related to communication with children about Louann's wishes to have her things/dog.     Louann was engaged in the visit and expressed understanding of information provided, could not repeat back consistently.       OBJECTIVE:  Louann was lying back in her bed during the visit. She reported fair mood, angry mood at times; affect labile. Thought processes tangential at times, illogical at times, not always engaging in reciprocal communication. Insight and judgment limited. Speech WNL. Denied suicidal ideation.        ASSESSMENT:  Louann endorses anxiety related to health and psychosocial stressors. She exhibits cognitive impairments related to memory and judgment, ongoing assessment of medical decision making capacity is indicated. I see a MoCA has been completed and her score was 20/30.       DIAGNOSIS:  Unspecified anxiety disorder, rule out PTSD vs. NICOLE      PLAN:  Plan for health psychology to follow this patient approximately once per week for the duration of their hospitalization.     Please feel free to call in urgent concerns arise prior to the next follow-up session.     Cuca Irving, PhD,   Clinical Health Psychologist  Phone: [phone]  Pager: [phone]      Time in: 2:10  Time out: 2:30

## 2024-01-09 NOTE — PLAN OF CARE
"9994-0431  BP (!) 142/88 (BP Location: Right arm)   Pulse 88   Temp 99.1  F (37.3  C) (Oral)   Resp 12   Ht 1.6 m (5' 3\")   Wt 45.4 kg (100 lb)   LMP 02/01/2011   SpO2 97%   BMI 17.71 kg/m      A&Ox4, forgetful. Bed alarm on. VSS on room air. Up with Ax1 and gb/walker. LUE splinted, needs reminders not to bear weight. C/o wrist pain, PRN tylenol and oxycodone given x1 on shift. Denies nausea. Regular diet. Voids spontaneously w/out difficulty, no BM on shift, pt unable to tell when last BM was. R PIV x2 saline locked. Medically ready for discharge, SW helping with placement.    New this shift: Vape pen found at bedside. Pt initially denied it was hers and said it was her sons, vape pen was returned to her son. Later the pt reported to writer that her pen helps with her appetite and helps keep her calm, provider notified, no changes to plan of care at this time.  "
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
## 2024-01-09 NOTE — PLAN OF CARE
"Status: Pt admitted from ED after a fall and L hemibody weakness, found to have subacute R PCA territory infarcts, possible UTI, and L distal non-displaced ulnar fracture.   Vitals: VSS on RA ex HTN within parameter. Intermittent irregular HR, team aware. CCM, cont pulse ox.   Neuros: A&O x4. Cooperative with cares, but very labile mood. Pt frequently agitated by staff, tangential, and fixates on things unrelated to the topic at hand. Strengths 4/5 ex LUE 3/5. Decreased sensation to L side of body. L field cut. Dizziness, pt believes it is from gabapentin which has been discontinued.   IV: PIV SL x2.   Labs/Electrolytes: WDL.   Resp/trach: LS clear. Denies SOB.   Diet: Regular, fair intake.   Bowel status: BS+. LBM 1/8.   : Voids spontaneously.   Skin: Splint to LUE - NWB. Pt needs reminders to not use LUE.  Pain: PRN Tylenol and oxy given for severe pain in LUE and left ribs x1 after PT this afternoon. Pt stated that she did not have any pain this AM, then c/o moderate pain around noon, but declined pain meds.  Activity: Assist of one, GB. Pt unsteady d/t dizziness.   Social: Daughter, Nichole, called for updates.   Plan: Medically ready for discharge, SW helping with placement.  Updates this shift: Pt worked with therapies this shift.      Stroke Patients:   Required education completed: stroke booklet given, partially reviewed with pt, pt easily distracted and does not seem appropriate for formal stroke teaching  Pneumoboots in place: pt refused    Stroke paged at [id number]: \"[id number] CRISS Lofton: Pt had a run of 29 PVCs per monitor. Pt also worked with PT and felt dizzy, BP checked and was orthostatic. Monalisa [phone]\" Provider to bedside to assess.   "

## 2024-01-10 ENCOUNTER — APPOINTMENT (OUTPATIENT)
Dept: CT IMAGING | Facility: CLINIC | Age: 64
DRG: 065 | End: 2024-01-10
Payer: MEDICARE

## 2024-01-10 ENCOUNTER — APPOINTMENT (OUTPATIENT)
Dept: OCCUPATIONAL THERAPY | Facility: CLINIC | Age: 64
DRG: 065 | End: 2024-01-10
Payer: MEDICARE

## 2024-01-10 LAB
ANION GAP SERPL CALCULATED.3IONS-SCNC: 5 MMOL/L (ref 7–15)
ATRIAL RATE - MUSE: 76 BPM
BUN SERPL-MCNC: 15.1 MG/DL (ref 8–23)
CALCIUM SERPL-MCNC: 8.9 MG/DL (ref 8.8–10.2)
CHLORIDE SERPL-SCNC: 107 MMOL/L (ref 98–107)
CREAT SERPL-MCNC: 0.78 MG/DL (ref 0.51–0.95)
DEPRECATED HCO3 PLAS-SCNC: 28 MMOL/L (ref 22–29)
DIASTOLIC BLOOD PRESSURE - MUSE: NORMAL MMHG
EGFRCR SERPLBLD CKD-EPI 2021: 85 ML/MIN/1.73M2
ERYTHROCYTE [DISTWIDTH] IN BLOOD BY AUTOMATED COUNT: 12.8 % (ref 10–15)
GLUCOSE SERPL-MCNC: 84 MG/DL (ref 70–99)
HAV AB SER QL IA: NONREACTIVE
HBV CORE AB SERPL QL IA: NONREACTIVE
HBV SURFACE AB SERPL IA-ACNC: <3.5 M[IU]/ML
HBV SURFACE AB SERPL IA-ACNC: NONREACTIVE M[IU]/ML
HCT VFR BLD AUTO: 39.6 % (ref 35–47)
HGB BLD-MCNC: 13.3 G/DL (ref 11.7–15.7)
INTERPRETATION ECG - MUSE: NORMAL
MAGNESIUM SERPL-MCNC: 2.1 MG/DL (ref 1.7–2.3)
MCH RBC QN AUTO: 31.3 PG (ref 26.5–33)
MCHC RBC AUTO-ENTMCNC: 33.6 G/DL (ref 31.5–36.5)
MCV RBC AUTO: 93 FL (ref 78–100)
P AXIS - MUSE: 66 DEGREES
PLATELET # BLD AUTO: 356 10E3/UL (ref 150–450)
POTASSIUM SERPL-SCNC: 4.7 MMOL/L (ref 3.4–5.3)
PR INTERVAL - MUSE: 82 MS
QRS DURATION - MUSE: 86 MS
QT - MUSE: 394 MS
QTC - MUSE: 443 MS
R AXIS - MUSE: -11 DEGREES
RBC # BLD AUTO: 4.25 10E6/UL (ref 3.8–5.2)
SODIUM SERPL-SCNC: 140 MMOL/L (ref 135–145)
SYSTOLIC BLOOD PRESSURE - MUSE: NORMAL MMHG
T AXIS - MUSE: 136 DEGREES
VENTRICULAR RATE- MUSE: 76 BPM
WBC # BLD AUTO: 6.3 10E3/UL (ref 4–11)

## 2024-01-10 PROCEDURE — 86706 HEP B SURFACE ANTIBODY: CPT

## 2024-01-10 PROCEDURE — 250N000013 HC RX MED GY IP 250 OP 250 PS 637: Performed by: PSYCHIATRY & NEUROLOGY

## 2024-01-10 PROCEDURE — 250N000013 HC RX MED GY IP 250 OP 250 PS 637

## 2024-01-10 PROCEDURE — 120N000002 HC R&B MED SURG/OB UMMC

## 2024-01-10 PROCEDURE — 36415 COLL VENOUS BLD VENIPUNCTURE: CPT

## 2024-01-10 PROCEDURE — 70450 CT HEAD/BRAIN W/O DYE: CPT | Mod: 26 | Performed by: RADIOLOGY

## 2024-01-10 PROCEDURE — 86704 HEP B CORE ANTIBODY TOTAL: CPT

## 2024-01-10 PROCEDURE — G1010 CDSM STANSON: HCPCS | Performed by: RADIOLOGY

## 2024-01-10 PROCEDURE — 99232 SBSQ HOSP IP/OBS MODERATE 35: CPT | Mod: GC | Performed by: PSYCHIATRY & NEUROLOGY

## 2024-01-10 PROCEDURE — 97530 THERAPEUTIC ACTIVITIES: CPT | Mod: GO

## 2024-01-10 PROCEDURE — 99222 1ST HOSP IP/OBS MODERATE 55: CPT | Performed by: PSYCHIATRY & NEUROLOGY

## 2024-01-10 PROCEDURE — 86708 HEPATITIS A ANTIBODY: CPT

## 2024-01-10 PROCEDURE — 99232 SBSQ HOSP IP/OBS MODERATE 35: CPT | Mod: GC | Performed by: STUDENT IN AN ORGANIZED HEALTH CARE EDUCATION/TRAINING PROGRAM

## 2024-01-10 PROCEDURE — 83735 ASSAY OF MAGNESIUM: CPT

## 2024-01-10 PROCEDURE — 70450 CT HEAD/BRAIN W/O DYE: CPT | Mod: MG

## 2024-01-10 PROCEDURE — 80048 BASIC METABOLIC PNL TOTAL CA: CPT

## 2024-01-10 PROCEDURE — 85014 HEMATOCRIT: CPT

## 2024-01-10 RX ORDER — HYDROXYZINE HYDROCHLORIDE 50 MG/1
50 TABLET, FILM COATED ORAL
Status: DISCONTINUED | OUTPATIENT
Start: 2024-01-10 | End: 2024-01-12 | Stop reason: HOSPADM

## 2024-01-10 RX ORDER — NALOXONE HYDROCHLORIDE 0.4 MG/ML
0.4 INJECTION, SOLUTION INTRAMUSCULAR; INTRAVENOUS; SUBCUTANEOUS
Status: DISCONTINUED | OUTPATIENT
Start: 2024-01-10 | End: 2024-01-12 | Stop reason: HOSPADM

## 2024-01-10 RX ORDER — HYDROXYZINE HYDROCHLORIDE 25 MG/1
25 TABLET, FILM COATED ORAL EVERY 6 HOURS PRN
Status: DISCONTINUED | OUTPATIENT
Start: 2024-01-10 | End: 2024-01-12 | Stop reason: HOSPADM

## 2024-01-10 RX ORDER — PREGABALIN 25 MG/1
25 CAPSULE ORAL 2 TIMES DAILY
Status: DISCONTINUED | OUTPATIENT
Start: 2024-01-10 | End: 2024-01-12 | Stop reason: HOSPADM

## 2024-01-10 RX ORDER — NALOXONE HYDROCHLORIDE 0.4 MG/ML
0.2 INJECTION, SOLUTION INTRAMUSCULAR; INTRAVENOUS; SUBCUTANEOUS
Status: DISCONTINUED | OUTPATIENT
Start: 2024-01-10 | End: 2024-01-12 | Stop reason: HOSPADM

## 2024-01-10 RX ORDER — NICOTINE 21 MG/24HR
1 PATCH, TRANSDERMAL 24 HOURS TRANSDERMAL DAILY
Status: DISCONTINUED | OUTPATIENT
Start: 2024-01-10 | End: 2024-01-12 | Stop reason: HOSPADM

## 2024-01-10 RX ORDER — NICOTINE 21 MG/24HR
1 PATCH, TRANSDERMAL 24 HOURS TRANSDERMAL DAILY
Status: DISCONTINUED | OUTPATIENT
Start: 2024-01-11 | End: 2024-01-10

## 2024-01-10 RX ORDER — OXYCODONE HYDROCHLORIDE 5 MG/1
5 TABLET ORAL EVERY 4 HOURS PRN
Status: DISCONTINUED | OUTPATIENT
Start: 2024-01-10 | End: 2024-01-12 | Stop reason: HOSPADM

## 2024-01-10 RX ADMIN — ACETAMINOPHEN 650 MG: 325 TABLET, FILM COATED ORAL at 12:37

## 2024-01-10 RX ADMIN — HYDROXYZINE HYDROCHLORIDE 25 MG: 25 TABLET, FILM COATED ORAL at 20:29

## 2024-01-10 RX ADMIN — APIXABAN 5 MG: 5 TABLET, FILM COATED ORAL at 07:22

## 2024-01-10 RX ADMIN — APIXABAN 5 MG: 5 TABLET, FILM COATED ORAL at 20:29

## 2024-01-10 RX ADMIN — ROSUVASTATIN CALCIUM 20 MG: 20 TABLET, FILM COATED ORAL at 07:21

## 2024-01-10 RX ADMIN — NICOTINE 1 PATCH: 14 PATCH, EXTENDED RELEASE TRANSDERMAL at 16:05

## 2024-01-10 RX ADMIN — OXYCODONE HYDROCHLORIDE 5 MG: 5 SOLUTION ORAL at 07:22

## 2024-01-10 RX ADMIN — OXYCODONE HYDROCHLORIDE 5 MG: 5 TABLET ORAL at 16:58

## 2024-01-10 RX ADMIN — ASPIRIN 81 MG CHEWABLE TABLET 81 MG: 81 TABLET CHEWABLE at 07:21

## 2024-01-10 RX ADMIN — HYDROXYZINE HYDROCHLORIDE 25 MG: 25 TABLET, FILM COATED ORAL at 14:34

## 2024-01-10 RX ADMIN — OXYCODONE HYDROCHLORIDE 5 MG: 5 SOLUTION ORAL at 12:37

## 2024-01-10 RX ADMIN — NICOTINE 1 PATCH: 21 PATCH, EXTENDED RELEASE TRANSDERMAL at 07:25

## 2024-01-10 RX ADMIN — ACETAMINOPHEN 650 MG: 325 TABLET, FILM COATED ORAL at 16:56

## 2024-01-10 RX ADMIN — PREGABALIN 50 MG: 25 CAPSULE ORAL at 07:21

## 2024-01-10 RX ADMIN — ACETAMINOPHEN 650 MG: 325 TABLET, FILM COATED ORAL at 07:21

## 2024-01-10 RX ADMIN — PREGABALIN 25 MG: 25 CAPSULE ORAL at 20:29

## 2024-01-10 ASSESSMENT — ACTIVITIES OF DAILY LIVING (ADL)
ADLS_ACUITY_SCORE: 33
ADLS_ACUITY_SCORE: 29
ADLS_ACUITY_SCORE: 33
ADLS_ACUITY_SCORE: 29
ADLS_ACUITY_SCORE: 33
ADLS_ACUITY_SCORE: 29
ADLS_ACUITY_SCORE: 29
ADLS_ACUITY_SCORE: 33
ADLS_ACUITY_SCORE: 33

## 2024-01-10 NOTE — PLAN OF CARE
"[id number]  /56 (BP Location: Right arm)   Pulse 63   Temp 98.1  F (36.7  C) (Oral)   Resp 13   Ht 1.6 m (5' 3\")   Wt 45.4 kg (100 lb)   LMP 02/01/2011   SpO2 100%   BMI 17.71 kg/m       VS- soft BP's 100's/50's on RA. On telemetry monitoring   BG- none   Neuro- Alert and oriented x4, labile mood at times.   Pain/Nausea/PRN'S- pain managed with PRN tylenol and Oxy.  Denies nausea,   Diet- regular diet   LDA- PIV X2 SL  Gtt/IVF- None   GI/- voiding without difficulty, LBM.1/8  Skin- LUE splint on  Activity- assist x1 w/ GB and walker. Bed alarm on.  Plan- medically ready for discharge, Awaiting placement.   "

## 2024-01-10 NOTE — PLAN OF CARE
Status: Pt admitted from ED after a fall and L hemibody weakness, found to have subacute R PCA territory infarcts, possible UTI, and L distal non-displaced ulnar fracture.   Vitals: VSS on RA, on CCM and cont pulse ox. Intermittent irregular HR   Neuros: A&Ox4, labile mood at times, strength 4/5 ex LUE 3/5. Decreased sensation to L side of body. Patient did not report dizziness this shift   IV: PIV SL  Resp/trach: Denies SOB  Diet: Regular diet-good PO intake   Bowel status: LBM 1/8   : Voids spontaneously.    Skin: Splint to LUE-NWB. Pateint needs reminders not to use LUE  Pain: LUE and L hip pain managed with Tylenol and Oxy. Cold/warm packs offered.   Activity: Ax1, GB  Social: Son visited-supportive   Plan: Medically ready for discharge, SW following, continue POC     Updates this shift: 500 ml LR bolus given, EKG completed     Stroke Patients:   Required education completed: Stroke booklet at bedside  Pneumoboots in place: Pt refused
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
## 2024-01-10 NOTE — PROGRESS NOTES
[facility]    Stroke Progress Note    Interval Events  -denies new pain  -endorses dizziness lasting <5 min with standing up, sitting up.   -left eye blurry vision worsening in last 2 days. Known LUQ vision loss s/p stroke  agreeable to abdominal binder  Orthostatics this AM negative. Bp 140s systolic.  -agreeable to psychiatry consult.   -repeat CT Head to assess for hemorrhagic conversion to explain for ongoing vision changes and dizziness    HPI Summary  Louann Lofton is a 63 year old woman with history of HLD, tobacco use disorder, ischemic cardiomyopathy with LV thrombus for which she completed 3 months of warfarin, atrial fibrillation not on anticoagulation, migraine without aura, homelessness, Nissen fundlopication 2009 with chronic abdominal pain, and history of cognitive impairment? who presents with subacute L hemibody weakness with an acute fall, found to have subacute R PCA territory infarcts, possible UTI, and L distal non-displaced ulnar fracture.       Stroke Evaluation Summarized     MRI/Head CT CT Head   IMPRESSION:  1.  New transcortical region of hypodensity identified involving the right occipital lobe with focal hypodensity involving the right thalamus. Findings are age-indeterminate, but concerning for a late acute to subacute ischemic injury within the right   PCA territory.   2.  Age-indeterminate bilateral inferior cerebellar lacunar-type infarctions, new since prior exam.  3.  Chronic left cerebellar infarct.  4.  No hemorrhage.     MRI Brain with and without contrast      IMPRESSION:  1.  Late acute versus subacute right PCA territory infarcts, as above. No hemorrhagic conversion.  2.  Chronic senescent and ischemic changes, as above.   Intracranial Vasculature IMPRESSION:   HEAD CTA:   1.  No definite proximal large vessel occlusion identified.  2.  Severe short segment stenosis/narrowing of the distal right P2 posterior cerebral  artery with faint opacification of the right P3/P4 posterior cerebral arteries beyond this finding, possibly related to a subocclusive thrombus.   3.  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 Follow-up Neuro Interventional Radiology consultation is recommended.  4.  Mild to moderate stenosis of the right cerebral clinoid ICA.      Cervical Vasculature NECK CTA:  1.  Atheromatous disease, as above. No high-grade stenosis or dissection.  2.  Low-density thyroid nodule measuring up to 2 cm. Recommend non-emergent thyroid function testing and thyroid ultrasound for further assessment.      Echocardiogram Left ventricular wall thickness is normal. Left ventricular size is normal.  Biplane LVEF is 30%. Large 3.8x1.8x1.2 cm LV apical thrombus noted. LAD  territory akinesis.      EKG/Telemetry Sinus rhythm with short W[place] interval, R atrial enlargement possible inferior infarct    Other Testing Not Applicable       LDL  1/3/2024: 109 mg/dL   A1C  1/2/2024: 5.9 %   Troponin 1/3/2024: 13 ng/L         Impression   Acute ischemic stroke of R PCA Territory infarcts due to cardioembolism from LV Thrombus  Louann Lynne is a 63 year old woman with history of HLD, polysubstance use disorder, ischemic cardiomyopathy with LV thrombus, atrial fibrillation not on anticoagulation, migraine without aura, homelessness, and history of cognitive impairment? who presents with subacute L hemibody weakness and sensory loss with recent fall, found to have subacute R PCA territory infarcts, possible UTI, and L distal non-displaced ulnar fracture. Urine drug screen positive for amphetamines and cannabinoids. Initial NIHSS 6 in setting of L hemibody weakness and sensory loss with extinction, as well as LUQ homonymous quadrantanopia. Not a candidate for thrombolysis given outside the time window and established infarctions on imaging.  Not a candidate for thrombectomy given no proximal large vessel occlusion.     CT/CTA  with evidence of subacute R PCA territory infarcts, concern for severe stenosis v.s. subocclusive thrombus of the R P2, and incidental L A2/3 4mm aneurysm. MRI confirms subacute infarction. TTE revealed large LV thrombus and newly decreased EF.      Mechanism of stroke cardioembolic from LV thrombus. Given newly reduced EF (30%, previously 48%), is possible that she had another MI leading to stagnation of blood and ultimately thrombus formation. Initially on heparin gtt, now therapeutic. Would ideally bridge to Warfarin, but have concerns about compliance and outpatient follow up for INR monitoring. Anticoagulating on eliquis     Plan  #Subacute R PCA territory infarcts, suspected etiology: cardioembolism from LV thrombus  #Severe stenosis v.s. subocclusive thrombus of R P2  #Incidental L A2/3 4mm aneurysm  - Admit to Neurology  - Neurochecks Q 4 hours  - BP goal normotension, anti-hypertensives for SBP > 180  - Avoid hypotonic IV fluids  - Telemetry, EKG  - Bedside Glucose Monitoring  - PT/OT/SLP  - Stroke Education  - Encourage Smoking Cessation  - Depression Screen  - Apnea Screen  - Apixaban 5 mg BID  - ASA 81 mg daily  - Crestor 20 mg  - Lyrica downtitrated to 25 mg BID   -Rpt CT head wo contrast to assess for hemorrhagic conversion  -follow up with neuroIR in outpatient for incidental aneurysm.    #Demand ischemia   #Hx HTN   #Hx a-fib  #HLD  Troponin leak likely secondary to amphetamine use v.s. pain from L ulnar fracture v.s. subacute stroke.Troponin peaked at 15.  -Pharmacy med rec consult   -Eliquis 5 mg BID  -Crestor 20 mg  -Holding Metoprolol tartrate 12.5 mg BID (given bradycardia)     #Vertigo and lightheadedness  #c/f Orthostatic hypotension  Negative orthostatics this AM. Vertigo episodes lasting minutes, improves with lying down and fluids. No other symptoms. EKG SR  -abdominal binder  -encourage po fluids  -PT/OT  -did not tolerate gabapentin, per patient worsened dizziness. Downtitrated pregabalin  "25mg BID  -rpt CT Head to assess for hemorrhagic changes  -consider salt tablets if dizziness persists with negative imaging findings    #Amphetamine use , ongoing  #Hx tobacco use   #Polysubstance Use Disorder  -Declines use, vape pen found in hospital room  -asked about medical marijuana use.  -agreed to speak with addiction med today.  -Nicotine patch      #Non-displaced distal L ulnar fracture   - Oxycodone 5 mg q4hr prn for pain  - Ortho consult, appreciate recommendations              - Re-splinted  - repeat A/P and lateral x-rays stable.              - Non-weight bearing LUE     #UTI, resolved  Not symptomatic. Urine culture growing E. Coli. Completed 3 day course of CTX.      #Headache, resolved  - Magnesium 2 g IV once  - Compazine 10 mg q6 hr prn  - Benadryl 25 mg q6 hr prn     #Homelessness   -Social work consult  - Medically ready for discharge. Appreciate SW assistance with dispo planning.      Prophylaxis            For VTE Prevention:  - eliquis     For Acid Suppression:  - None     Patient Follow-up    - Neuro-ophthalmology follow up  - Stroke EFE follow up   - Cardiology follow up  - Orthopedic follow up  - Outpatient CTA head and neck in 1 year to assess for aneurysm stability   -outpatient neuro IR follow-up of incidental aneurysm       The patient was discussed with Dr. Castro.  Nereida Daly MD  Neurology Resident  Pager:  [phone]  ______________________________________________________    Clinically Significant Risk Factors                  # Hypertension: Noted on problem list        # Cachexia: Estimated body mass index is 17.71 kg/m  as calculated from the following:    Height as of this encounter: 1.6 m (5' 3\").    Weight as of this encounter: 45.4 kg (100 lb).      # Asthma: noted on problem list          Medications   Scheduled Meds   apixaban ANTICOAGULANT  5 mg Oral BID    aspirin  81 mg Oral Daily    influenza quadrivalent (PF) vacc  0.5 mL Intramuscular Prior to discharge    [Held by " "provider] metoprolol tartrate  12.5 mg Oral BID    nicotine  1 patch Transdermal Daily    nicotine   Transdermal Q8H    nicotine   Transdermal Q8H    pregabalin  25 mg Oral BID    rosuvastatin  20 mg Oral Daily    sodium chloride (PF)  3 mL Intracatheter Q8H       Infusion Meds   - MEDICATION INSTRUCTIONS -         PRN Meds  acetaminophen, diphenhydrAMINE, labetalol **OR** hydrALAZINE, lidocaine 4%, lidocaine (buffered or not buffered), meclizine, - MEDICATION INSTRUCTIONS -, ondansetron **OR** ondansetron, oxyCODONE, prochlorperazine, prochlorperazine **OR** prochlorperazine **OR** prochlorperazine, sodium chloride (PF)       PHYSICAL EXAMINATION  Temp:  [97.7  F (36.5  C)-98.4  F (36.9  C)] 97.7  F (36.5  C)  Pulse:  [61-71] 61  Resp:  [13-16] 15  BP: (103-131)/(56-81) 131/73  SpO2:  [99 %-100 %] 100 %      General Exam  General:  patient sitting up in bed eating breakfast without any acute distress    HEENT:  normocephalic/atraumatic, poor dental hygiene  Cardio:  RRR  Pulmonary:  no respiratory distress  Abdomen:  soft  Extremities:  no edema bilateral calf wasting  Skin:  intact, warm/dry     Neuro Exam  Mental Status:  alert, oriented x 3, follows commands, naming and repetition normal slightly dysarthric speech  Cranial Nerves:  visual fields intact with blurriness in the left eye, reduced in LUQ since stroke. EOMI with normal smooth pursuit, facial sensation intact and symmetric, facial movements symmetric, hearing not formally tested but intact to conversation, palate elevation symmetric and uvula midline, shoulder shrug strong bilaterally, tongue protrusion midline. Slight dysarthria.   Motor:  normal muscle tone and bulk, no abnormal movements, able to move all limbs spontaneously, no pronator drift, exam of left arm limited by cast and pain. Left hand squeeze 4/5 compared 5/5 on left.  Reflexes:  no clonus  Sensory:  sensation intact, diminished on left face v1-v3 \"1%\", left UE \"2%\" , LE \"0%\" compared to " right side. Left sided neglect with double simultaneously stimulation  Coordination:  normal finger-to-nose and heel-to-shin bilaterally without dysmetria  Station/Gait:  deferred    Stroke Scales    NIHSS  1a. Level of Consciousness 0-->Alert, keenly responsive   1b. LOC Questions 0-->Answers both questions correctly   1c. LOC Commands 0-->Performs both tasks correctly   2.   Best Gaze 0-->Normal   3.   Visual 1-->Partial hemianopia (LUQ vision loss)   4.   Facial Palsy 0-->Normal symmetrical movements   5a. Motor Arm, Left 2-->Some effort against gravity, limb cannot get to or maintain (if cued) 90 (or 45) degrees, drifts down to bed, but has some effort against gravity (exam limited by pain)   5b. Motor Arm, Right 0-->No drift, limb holds 90 (or 45) degrees for full 10 secs   6a. Motor Leg, Left 0-->No drift, leg holds 30 degree position for full 5 secs   6b. Motor Leg, right 0-->No drift, leg holds 30 degree position for full 5 secs   7.   Limb Ataxia 0-->Absent   8.   Sensory 1-->Mild-to-moderate sensory loss, patient feels pinprick is less sharp or is dull on the affected side, or there is a loss of superficial pain with pinprick, but patient is aware of being touched (diminished on left face, arm and leg compared to right side)   9.   Best Language 0-->No aphasia, normal   10. Dysarthria 1-->Mild-to-moderate dysarthria, patient slurs at least some words and, at worst, can be understood with some difficulty   11. Extinction and Inattention  1-->Visual, tactile, auditory, spatial, or personal inattention or extinction to bilateral simultaneous stimulation in one of the sensory modalities (left side neglect with double simultaneous stimulation)   Total 6 (01/10/24 0805)       Modified [place] Score (Pre-morbid)  4-Moderately severe disability; unable to walk without assistance and unable to attend to own bodily    Imaging  I personally reviewed all imaging; relevant findings per HPI.     Lab Results Data  "  CBC  Recent Labs   Lab 01/10/24  0656 01/09/24  0501 01/08/24  0700   WBC 6.3 5.8 6.8   RBC 4.25 4.52 4.45   HGB 13.3 14.0 13.9   HCT 39.6 42.3 41.6    386 379     Basic Metabolic Panel    Recent Labs   Lab 01/10/24  0656 01/09/24  0501 01/08/24  0700    140 139   POTASSIUM 4.7 4.0 4.2   CHLORIDE 107 105 103   CO2 28 27 28   BUN 15.1 17.3 14.2   CR 0.78 0.69 0.71   GLC 84 81 86   PB 8.9 8.7* 8.8     Liver Panel  No results for input(s): \"PROTTOTAL\", \"ALBUMIN\", \"BILITOTAL\", \"ALKPHOS\", \"AST\", \"ALT\", \"BILIDIRECT\" in the last 168 hours.  INR    Recent Labs   Lab Test 01/02/24  2356 12/07/20  1550   INR 0.99 1      Lipid Profile    Recent Labs   Lab Test 01/03/24  0213 12/10/20  1113   CHOL 171  --    HDL 46*  --    *  --    TRIG 80 83     A1C    Recent Labs   Lab Test 01/02/24  2356   A1C 5.9*     Troponin    Recent Labs   Lab 01/07/24  0455 01/06/24  0047 01/05/24  1858   CTROPT 9 11 10          Data      "
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
## 2024-01-10 NOTE — PLAN OF CARE
Status: Admitted 1/2 with L sided weakness and a fall. Found to have R PCA infarcts and L ulnar fracture.  Vitals: HTN within parameters. SBP < 180. on RA. CCM. Continuous pulse ox.   Neuros: A&O x4. Forgetful at times. 4/5 throughout ex LUE 3/5. Decreased sensation to L side of body. Bilateral blurry vision new this shift- head CT completed. Intermittently tearful throughout the shift.   IV: PIV SL  Labs/Electrolytes: Head CT stable  Resp/trach: WNL  Diet: Regular diet. Good PO  Bowel status: LBM 1/8. Passing gas.  : Voiding spontaneously  Skin: LUE splint- NWB. Nicotine patch on R arm.  Pain: L arm, rib, and hip pain managed with PRN Tylenol and Oxycodone and ice packs. PRN Atarax given x1.  Activity: A1, GB. Walked halls x1 with RN  Social: Writer talked to daughter Nichole over phone this shift.  Plan: HECTOR following for discharge.  Updates this shift: Psychiatry consulted. See note.    Stroke Patients:   Required education completed: Booklet at bedside  Pneumoboots in place: No. Patient refused    Goal Outcome Evaluation:      Plan of Care Reviewed With: patient    Overall Patient Progress: no change    Outcome Evaluation: Head CT completed. SW following for discharge plans

## 2024-01-10 NOTE — PLAN OF CARE
Status: Pt admitted from ED after a fall and L hemibody weakness, found to have subacute R PCA territory infarcts, possible UTI, and L distal non-displaced ulnar fracture.   Vitals: VSS on RA, on CCM and cont pulse ox.   Neuros: A&Ox4, labile mood at times, strength 4/5 ex LUE 3/5. Decreased sensation to L side of body. Bilateral blurry vision   IV: PIV SL  Resp/trach: Denies SOB  Diet: Regular diet-good PO intake   Bowel status: LBM 1/9  : Voids spontaneously.    Skin: Splint to LUE-NWB. Pateint needs reminders not to use LUE  Pain: LUE and L hip pain managed with Tylenol and Oxy. Cold packs offered. Atarax given x1 this shift   Activity: Ax1, GB. Walked x1 with PT   Plan: Medically ready for discharge, SW following, continue POC     Updates this shift: Addiction medicine SW came at bedside for assessment     Stroke Patients:   Required education completed: Stroke booklet at bedside  Pneumoboots in place: Pt refused

## 2024-01-10 NOTE — PROGRESS NOTES
"Care Management Follow Up     Length of Stay (days): 7     Expected Discharge Date:   Pt medically ready for discharge as of 1/6/2024     Concerns to be Addressed:     Disposition planning  Patient plan of care discussed at interdisciplinary rounds: Yes     Anticipated Discharge Disposition:   TCU placement     Anticipated Discharge Services:   TCU placement  Anticipated Discharge DME:    Not applicable at this time     Patient/family educated on Medicare website which has current facility and service quality ratings:   No, as options will be limited due to pt's positive urine drug screen  Education Provided on the Discharge Plan:  Yes  Patient/Family in Agreement with the Plan:  Yes     Referrals Placed by CM/SW:   Post acute care facility's  Private pay costs discussed: Not applicable at this time     Additional Information:  SW is following pt for discharge planning.  Per Dr. Cota and Dr. Daly, pt is medically ready for discharge.  OT and Physical Therapy are recommending a TCU stay.    OT completed a MoCA on 1/9/2024 and pt scored 20/30 indicating mild cognitive impairment.       SW completed a  \"Bed Reservation Request\" form that was submitted to Care Management Dept Management team to request consideration for admit to this program [facility] [place] on 1/9/2024 and learned today that pt was declined for this program as a conclusion was reached that pt is not appropriate for the program.    The following TCU's/SNF's have assessed and declined pt for admit:  - [facility], per [place], pt is not appropriate for their facility  - [facility], per [place], pt declined for admit due to CD history  - [facility], declined on line (through EPIC) due to history of drug abuse       SW placed follow up calls to Admissions at the following TCU's/SNF's:    -  [facility] Admissions for consideration at : [facility] [place], [facility] [place], [facility] [place], [facility] [place], [facility] [place], [facility] [place], [facility], [facility] [place], [facility] [place],  [facility] [place], [facility] [place], [facility] [place] [place], [facility], [facility] [place], [facility], [facility] [place], [facility], [facility] [place], [facility] [place], [facility] [place] and [facility] [place].   Bianca states that she has not yet assessed pt but will assess and get back to this SW with a decision regarding acceptance  - [facility], HECTOR spoke with Susanna who states that she will review the referral with her supervisor and get back to this SW regarding acceptance.    SW phoned pt's daughter (Nichole Mattson) and updated in regards to discharge planning.  Nichole Mattson states that her son works at the  [employer] (pt is [place]) and they offer elder housing but pt would need to be independent and thus needs the TCU stay.      HECTOR will continue to follow for discharge planning.    JOLENE Mckeon  Social Work, 6A  Phone:  [phone]  Pager:  [phone]  1/10/2024           HECTOR will continue to follow for discharge planning.

## 2024-01-11 ENCOUNTER — APPOINTMENT (OUTPATIENT)
Dept: PHYSICAL THERAPY | Facility: CLINIC | Age: 64
DRG: 065 | End: 2024-01-11
Payer: MEDICARE

## 2024-01-11 LAB
ANION GAP SERPL CALCULATED.3IONS-SCNC: 8 MMOL/L (ref 7–15)
BUN SERPL-MCNC: 14.9 MG/DL (ref 8–23)
CALCIUM SERPL-MCNC: 8.5 MG/DL (ref 8.8–10.2)
CHLORIDE SERPL-SCNC: 107 MMOL/L (ref 98–107)
CREAT SERPL-MCNC: 0.69 MG/DL (ref 0.51–0.95)
DEPRECATED HCO3 PLAS-SCNC: 26 MMOL/L (ref 22–29)
EGFRCR SERPLBLD CKD-EPI 2021: >90 ML/MIN/1.73M2
ERYTHROCYTE [DISTWIDTH] IN BLOOD BY AUTOMATED COUNT: 12.7 % (ref 10–15)
GLUCOSE SERPL-MCNC: 87 MG/DL (ref 70–99)
HCT VFR BLD AUTO: 38.3 % (ref 35–47)
HGB BLD-MCNC: 12.7 G/DL (ref 11.7–15.7)
MAGNESIUM SERPL-MCNC: 1.9 MG/DL (ref 1.7–2.3)
MCH RBC QN AUTO: 30.9 PG (ref 26.5–33)
MCHC RBC AUTO-ENTMCNC: 33.2 G/DL (ref 31.5–36.5)
MCV RBC AUTO: 93 FL (ref 78–100)
PLATELET # BLD AUTO: 359 10E3/UL (ref 150–450)
POTASSIUM SERPL-SCNC: 4.3 MMOL/L (ref 3.4–5.3)
RBC # BLD AUTO: 4.11 10E6/UL (ref 3.8–5.2)
SODIUM SERPL-SCNC: 141 MMOL/L (ref 135–145)
WBC # BLD AUTO: 7.5 10E3/UL (ref 4–11)

## 2024-01-11 PROCEDURE — 90686 IIV4 VACC NO PRSV 0.5 ML IM: CPT | Performed by: STUDENT IN AN ORGANIZED HEALTH CARE EDUCATION/TRAINING PROGRAM

## 2024-01-11 PROCEDURE — 250N000013 HC RX MED GY IP 250 OP 250 PS 637: Performed by: PSYCHIATRY & NEUROLOGY

## 2024-01-11 PROCEDURE — 97116 GAIT TRAINING THERAPY: CPT | Mod: GP | Performed by: PHYSICAL THERAPIST

## 2024-01-11 PROCEDURE — 250N000013 HC RX MED GY IP 250 OP 250 PS 637

## 2024-01-11 PROCEDURE — 85027 COMPLETE CBC AUTOMATED: CPT

## 2024-01-11 PROCEDURE — 120N000002 HC R&B MED SURG/OB UMMC

## 2024-01-11 PROCEDURE — 97530 THERAPEUTIC ACTIVITIES: CPT | Mod: GP | Performed by: PHYSICAL THERAPIST

## 2024-01-11 PROCEDURE — 83735 ASSAY OF MAGNESIUM: CPT

## 2024-01-11 PROCEDURE — 99232 SBSQ HOSP IP/OBS MODERATE 35: CPT | Performed by: STUDENT IN AN ORGANIZED HEALTH CARE EDUCATION/TRAINING PROGRAM

## 2024-01-11 PROCEDURE — 999N000128 HC STATISTIC PERIPHERAL IV START W/O US GUIDANCE

## 2024-01-11 PROCEDURE — 36415 COLL VENOUS BLD VENIPUNCTURE: CPT

## 2024-01-11 PROCEDURE — G0008 ADMIN INFLUENZA VIRUS VAC: HCPCS | Performed by: STUDENT IN AN ORGANIZED HEALTH CARE EDUCATION/TRAINING PROGRAM

## 2024-01-11 PROCEDURE — 99232 SBSQ HOSP IP/OBS MODERATE 35: CPT | Mod: GC | Performed by: PSYCHIATRY & NEUROLOGY

## 2024-01-11 PROCEDURE — 250N000011 HC RX IP 250 OP 636

## 2024-01-11 PROCEDURE — 258N000003 HC RX IP 258 OP 636

## 2024-01-11 PROCEDURE — 250N000011 HC RX IP 250 OP 636: Performed by: STUDENT IN AN ORGANIZED HEALTH CARE EDUCATION/TRAINING PROGRAM

## 2024-01-11 PROCEDURE — 80048 BASIC METABOLIC PNL TOTAL CA: CPT

## 2024-01-11 RX ORDER — MAGNESIUM SULFATE HEPTAHYDRATE 40 MG/ML
2 INJECTION, SOLUTION INTRAVENOUS ONCE
Status: COMPLETED | OUTPATIENT
Start: 2024-01-11 | End: 2024-01-11

## 2024-01-11 RX ORDER — ASPIRIN 81 MG/1
81 TABLET, CHEWABLE ORAL DAILY
Qty: 30 TABLET | Refills: 11 | Status: SHIPPED | OUTPATIENT
Start: 2024-01-11

## 2024-01-11 RX ORDER — OLANZAPINE 10 MG/2ML
5 INJECTION, POWDER, FOR SOLUTION INTRAMUSCULAR
Status: DISCONTINUED | OUTPATIENT
Start: 2024-01-11 | End: 2024-01-11

## 2024-01-11 RX ORDER — OXYCODONE HYDROCHLORIDE 5 MG/1
5 TABLET ORAL EVERY 6 HOURS PRN
Status: SHIPPED | DISCHARGE
Start: 2024-01-11 | End: 2024-01-12

## 2024-01-11 RX ORDER — POLYETHYLENE GLYCOL 3350 17 G/17G
17 POWDER, FOR SOLUTION ORAL DAILY
Status: DISCONTINUED | OUTPATIENT
Start: 2024-01-11 | End: 2024-01-12 | Stop reason: HOSPADM

## 2024-01-11 RX ORDER — DIPHENHYDRAMINE HYDROCHLORIDE 50 MG/ML
25 INJECTION INTRAMUSCULAR; INTRAVENOUS ONCE
Status: COMPLETED | OUTPATIENT
Start: 2024-01-11 | End: 2024-01-11

## 2024-01-11 RX ORDER — OLANZAPINE 10 MG/2ML
5 INJECTION, POWDER, FOR SOLUTION INTRAMUSCULAR ONCE
Status: DISCONTINUED | OUTPATIENT
Start: 2024-01-11 | End: 2024-01-11

## 2024-01-11 RX ORDER — ROSUVASTATIN CALCIUM 20 MG/1
20 TABLET, COATED ORAL DAILY
Qty: 45 TABLET | Refills: 1 | Status: SHIPPED | OUTPATIENT
Start: 2024-01-11 | End: 2024-01-12

## 2024-01-11 RX ORDER — POLYETHYLENE GLYCOL 3350 17 G/17G
17 POWDER, FOR SOLUTION ORAL DAILY
Status: DISCONTINUED | OUTPATIENT
Start: 2024-01-11 | End: 2024-01-11

## 2024-01-11 RX ORDER — NICOTINE 21 MG/24HR
1 PATCH, TRANSDERMAL 24 HOURS TRANSDERMAL DAILY
DISCHARGE
Start: 2024-01-12

## 2024-01-11 RX ORDER — PREGABALIN 25 MG/1
25 CAPSULE ORAL 2 TIMES DAILY
Status: SHIPPED | DISCHARGE
Start: 2024-01-11 | End: 2024-01-12

## 2024-01-11 RX ORDER — OLANZAPINE 2.5 MG/1
5 TABLET, FILM COATED ORAL
Status: COMPLETED | OUTPATIENT
Start: 2024-01-11 | End: 2024-01-11

## 2024-01-11 RX ADMIN — ACETAMINOPHEN 650 MG: 325 TABLET, FILM COATED ORAL at 09:02

## 2024-01-11 RX ADMIN — PROCHLORPERAZINE EDISYLATE 10 MG: 5 INJECTION INTRAMUSCULAR; INTRAVENOUS at 11:30

## 2024-01-11 RX ADMIN — OXYCODONE HYDROCHLORIDE 5 MG: 5 TABLET ORAL at 22:44

## 2024-01-11 RX ADMIN — ONDANSETRON 4 MG: 4 TABLET, ORALLY DISINTEGRATING ORAL at 18:49

## 2024-01-11 RX ADMIN — PREGABALIN 25 MG: 25 CAPSULE ORAL at 20:06

## 2024-01-11 RX ADMIN — OLANZAPINE 5 MG: 2.5 TABLET, FILM COATED ORAL at 16:41

## 2024-01-11 RX ADMIN — MAGNESIUM SULFATE IN WATER 2 G: 40 INJECTION, SOLUTION INTRAVENOUS at 12:13

## 2024-01-11 RX ADMIN — OXYCODONE HYDROCHLORIDE 5 MG: 5 TABLET ORAL at 00:39

## 2024-01-11 RX ADMIN — OXYCODONE HYDROCHLORIDE 5 MG: 5 TABLET ORAL at 09:02

## 2024-01-11 RX ADMIN — APIXABAN 5 MG: 5 TABLET, FILM COATED ORAL at 20:05

## 2024-01-11 RX ADMIN — OXYCODONE HYDROCHLORIDE 5 MG: 5 TABLET ORAL at 18:49

## 2024-01-11 RX ADMIN — ACETAMINOPHEN 650 MG: 325 TABLET, FILM COATED ORAL at 22:45

## 2024-01-11 RX ADMIN — HYDROXYZINE HYDROCHLORIDE 50 MG: 50 TABLET, FILM COATED ORAL at 20:05

## 2024-01-11 RX ADMIN — OXYCODONE HYDROCHLORIDE 5 MG: 5 TABLET ORAL at 04:36

## 2024-01-11 RX ADMIN — APIXABAN 5 MG: 5 TABLET, FILM COATED ORAL at 09:02

## 2024-01-11 RX ADMIN — SODIUM CHLORIDE 500 ML: 9 INJECTION, SOLUTION INTRAVENOUS at 12:13

## 2024-01-11 RX ADMIN — PREGABALIN 25 MG: 25 CAPSULE ORAL at 09:02

## 2024-01-11 RX ADMIN — DIPHENHYDRAMINE HYDROCHLORIDE 25 MG: 50 INJECTION, SOLUTION INTRAMUSCULAR; INTRAVENOUS at 11:30

## 2024-01-11 RX ADMIN — HYDROXYZINE HYDROCHLORIDE 25 MG: 25 TABLET, FILM COATED ORAL at 12:13

## 2024-01-11 RX ADMIN — ACETAMINOPHEN 650 MG: 325 TABLET, FILM COATED ORAL at 16:43

## 2024-01-11 RX ADMIN — INFLUENZA A VIRUS A/VICTORIA/4897/2022 IVR-238 (H1N1) ANTIGEN (FORMALDEHYDE INACTIVATED), INFLUENZA A VIRUS A/DARWIN/9/2021 SAN-010 (H3N2) ANTIGEN (FORMALDEHYDE INACTIVATED), INFLUENZA B VIRUS B/PHUKET/3073/2013 ANTIGEN (FORMALDEHYDE INACTIVATED), AND INFLUENZA B VIRUS B/MICHIGAN/01/2021 ANTIGEN (FORMALDEHYDE INACTIVATED) 0.5 ML: 15; 15; 15; 15 INJECTION, SUSPENSION INTRAMUSCULAR at 09:53

## 2024-01-11 RX ADMIN — ACETAMINOPHEN 650 MG: 325 TABLET, FILM COATED ORAL at 00:39

## 2024-01-11 RX ADMIN — ACETAMINOPHEN 650 MG: 325 TABLET, FILM COATED ORAL at 04:37

## 2024-01-11 RX ADMIN — ROSUVASTATIN CALCIUM 20 MG: 20 TABLET, FILM COATED ORAL at 09:02

## 2024-01-11 RX ADMIN — NICOTINE 1 PATCH: 14 PATCH, EXTENDED RELEASE TRANSDERMAL at 16:17

## 2024-01-11 RX ADMIN — ASPIRIN 81 MG CHEWABLE TABLET 81 MG: 81 TABLET CHEWABLE at 09:02

## 2024-01-11 ASSESSMENT — ACTIVITIES OF DAILY LIVING (ADL)
ADLS_ACUITY_SCORE: 27
ADLS_ACUITY_SCORE: 29
ADLS_ACUITY_SCORE: 29
ADLS_ACUITY_SCORE: 27
ADLS_ACUITY_SCORE: 29
ADLS_ACUITY_SCORE: 29
ADLS_ACUITY_SCORE: 27
ADLS_ACUITY_SCORE: 29
ADLS_ACUITY_SCORE: 27

## 2024-01-11 NOTE — PLAN OF CARE
Goal Outcome Evaluation:      Plan of Care Reviewed With: patient    Overall Patient Progress: improvingOverall Patient Progress: improving    Outcome Evaluation: ambulating well in room, to bathroom, with GB/Walker/    Status: Pt admitted from ED after a fall and L hemibody weakness, found to have subacute R PCA territory infarcts, possible UTI, and L distal non-displaced ulnar fracture.     Vitals: VSS on RA.  CCM. Continuous pulse ox.  Neuros: A&Ox4.  Strength 4/5 ex LUE 3/5.  Decreased sensation to L side of body.  Bilateral blurry vision.  IV: PIV x2, SL  Resp/trach: WDL  Diet: regular  Bowel status: LBM 1/9  : voiding spont, to toilet  Skin: splint to LUE- NWB.  Patient needs reminding to not use LUE  Pain: pain to LUE, managed with oxycodone and tylenol  Activity: A1/GB/walker  Plan: SW following.  Medically ready to discharge.  Continue with POC. Addiction medicine following.      Stroke Patients:   Required education completed:stroke booklet at bedside.  Pneumoboots in place: refused

## 2024-01-11 NOTE — PROGRESS NOTES
[facility]    Stroke Progress Note    Interval Events  -endorses migraine, states she has had these periodically since her pregnancy. Migraine cocktail ordered  -no new weakness, n/t  -medically ready for discharge, awaiting placement  -behavior health/addiction following  -atarax effective, down titration of lyrica effective    HPI Summary  Louann Lofton is a 63 year old woman with history of HLD, tobacco use disorder, ischemic cardiomyopathy with LV thrombus for which she completed 3 months of warfarin, atrial fibrillation not on anticoagulation, migraine without aura, homelessness, Nissen fundlopication 2009 with chronic abdominal pain, and history of cognitive impairment? who presents with subacute L hemibody weakness with an acute fall, found to have subacute R PCA territory infarcts, possible UTI, and L distal non-displaced ulnar fracture.        Stroke Evaluation Summarized     MRI/Head CT CT Head   IMPRESSION:  1.  New transcortical region of hypodensity identified involving the right occipital lobe with focal hypodensity involving the right thalamus. Findings are age-indeterminate, but concerning for a late acute to subacute ischemic injury within the right   PCA territory.   2.  Age-indeterminate bilateral inferior cerebellar lacunar-type infarctions, new since prior exam.  3.  Chronic left cerebellar infarct.  4.  No hemorrhage.     MRI Brain with and without contrast      IMPRESSION:  1.  Late acute versus subacute right PCA territory infarcts, as above. No hemorrhagic conversion.  2.  Chronic senescent and ischemic changes, as above.   Intracranial Vasculature IMPRESSION:   HEAD CTA:   1.  No definite proximal large vessel occlusion identified.  2.  Severe short segment stenosis/narrowing of the distal right P2 posterior cerebral artery with faint opacification of the right P3/P4 posterior cerebral arteries beyond this finding, possibly related to a  subocclusive thrombus.   3.  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 Follow-up Neuro Interventional Radiology consultation is recommended.  4.  Mild to moderate stenosis of the right cerebral clinoid ICA.      Cervical Vasculature NECK CTA:  1.  Atheromatous disease, as above. No high-grade stenosis or dissection.  2.  Low-density thyroid nodule measuring up to 2 cm. Recommend non-emergent thyroid function testing and thyroid ultrasound for further assessment.      Echocardiogram Left ventricular wall thickness is normal. Left ventricular size is normal.  Biplane LVEF is 30%. Large 3.8x1.8x1.2 cm LV apical thrombus noted. LAD  territory akinesis.      EKG/Telemetry Sinus rhythm with short LA interval, R atrial enlargement possible inferior infarct    Other Testing Not Applicable       LDL  1/3/2024: 109 mg/dL   A1C  1/2/2024: 5.9 %   Troponin 1/3/2024: 13 ng/L         Impression   Acute ischemic stroke of R PCA Territory infarcts due to cardioembolism from LV Thrombus  Louann Lofton is a 63 year old woman with history of HLD, polysubstance use disorder, ischemic cardiomyopathy with LV thrombus, atrial fibrillation not on anticoagulation, migraine without aura, homelessness, and history of cognitive impairment? who presents with subacute L hemibody weakness and sensory loss with recent fall, found to have subacute R PCA territory infarcts, possible UTI, and L distal non-displaced ulnar fracture. Urine drug screen positive for amphetamines and cannabinoids. Initial NIHSS 6 in setting of L hemibody weakness and sensory loss with extinction, as well as LUQ homonymous quadrantanopia. Not a candidate for thrombolysis given outside the time window and established infarctions on imaging.  Not a candidate for thrombectomy given no proximal large vessel occlusion.     CT/CTA with evidence of subacute R PCA territory infarcts, concern for severe stenosis v.s. subocclusive thrombus of the R P2, and  incidental L A2/3 4mm aneurysm. MRI confirms subacute infarction. TTE revealed large LV thrombus and newly decreased EF.      Mechanism of stroke cardioembolic from LV thrombus. Given newly reduced EF (30%, previously 48%), is possible that she had another MI leading to stagnation of blood and ultimately thrombus formation. Initially on heparin gtt, now therapeutic. Would ideally bridge to Warfarin, but have concerns about compliance and outpatient follow up for INR monitoring. Anticoagulating on eliquis     Plan  #Subacute R PCA territory infarcts, suspected etiology: cardioembolism from LV thrombus  #Severe stenosis v.s. subocclusive thrombus of R P2  #Incidental L A2/3 4mm aneurysm  - Admit to Neurology  - Neurochecks Q 4 hours  - BP goal normotension, anti-hypertensives for SBP > 180  - Avoid hypotonic IV fluids  - Telemetry, EKG  - Bedside Glucose Monitoring  - PT/OT/SLP  - Stroke Education  - Encourage Smoking Cessation  - Depression Screen  - Apnea Screen  - Apixaban 5 mg BID  - ASA 81 mg daily  - Crestor 20 mg  - Lyrica downtitrated to 25 mg BID   -Rpt CT head wo contrast to assess for hemorrhagic conversion  -follow up with neuroIR in outpatient for incidental aneurysm.     #Demand ischemia   #Hx HTN   #Hx a-fib  #HLD  Troponin leak likely secondary to amphetamine use v.s. pain from L ulnar fracture v.s. subacute stroke.Troponin peaked at 15.  -Pharmacy med rec consult   -Eliquis 5 mg BID  -Crestor 20 mg  -Holding Metoprolol tartrate 12.5 mg BID (given bradycardia)     #Vertigo and lightheadedness, improved  #c/f Orthostatic hypotension  Negative orthostatics this AM. Vertigo episodes lasting minutes, improves with lying down and fluids. No other symptoms. EKG SR  -abdominal binder  -encourage po fluids  -PT/OT  -did not tolerate gabapentin, per patient worsened dizziness. Downtitrated pregabalin 25mg BID  -rpt CT Head stable, no hemorrhagic transformation   -consider salt tablets if dizziness persists       "  #Amphetamine use , ongoing  #Hx tobacco use   #Polysubstance Use Disorder  -addiction med   -medical marijuana  -Nicotine patch , attempting to wean off     #Non-displaced distal L ulnar fracture   - Oxycodone 5 mg q4hr prn for pain  - Ortho consult, appreciate recommendations              - Re-splinted  - repeat A/P and lateral x-rays stable.              - Non-weight bearing LUE     #UTI, resolved  Not symptomatic. Urine culture growing E. Coli. Completed 3 day course of CTX.      #Migraine  - Magnesium 2 g IV once  - Compazine 10 mg q6 hr prn  - Benadryl 25 mg q6 hr prn     #Homelessness   -Social work consult  - Medically ready for discharge. Appreciate SW assistance with dispo planning.      Prophylaxis            For VTE Prevention:  - eliquis     For Acid Suppression:  - None     Patient Follow-up    - Neuro-ophthalmology follow up  - Stroke EFE follow up   - Cardiology follow up  - Orthopedic follow up  - Outpatient CTA head and neck in 1 year to assess for aneurysm stability   -outpatient neuro IR follow-up of incidental aneurysm        The patient was discussed with Dr. Castro.  Nereida Daly MD  Neurology Resident  Pager:  [phone]  ______________________________________________________    Clinically Significant Risk Factors                  # Hypertension: Noted on problem list        # Cachexia: Estimated body mass index is 17.71 kg/m  as calculated from the following:    Height as of this encounter: 1.6 m (5' 3\").    Weight as of this encounter: 45.4 kg (100 lb).      # Asthma: noted on problem list          Medications   Scheduled Meds   apixaban ANTICOAGULANT  5 mg Oral BID    aspirin  81 mg Oral Daily    magnesium sulfate  2 g Intravenous Once    [Held by provider] metoprolol tartrate  12.5 mg Oral BID    nicotine  1 patch Transdermal Daily    nicotine   Transdermal Q8H    nicotine   Transdermal Q8H    pregabalin  25 mg Oral BID    rosuvastatin  20 mg Oral Daily    sodium chloride (PF)  3 mL " "Intracatheter Q8H    sodium chloride 0.9%  500 mL Intravenous Once       Infusion Meds   - MEDICATION INSTRUCTIONS -         PRN Meds  acetaminophen, labetalol **OR** hydrALAZINE, hydrOXYzine HCl, hydrOXYzine HCl, lidocaine 4%, lidocaine (buffered or not buffered), meclizine, - MEDICATION INSTRUCTIONS -, naloxone **OR** naloxone **OR** naloxone **OR** naloxone, ondansetron **OR** ondansetron, oxyCODONE, sodium chloride (PF)       PHYSICAL EXAMINATION  Temp:  [97.6  F (36.4  C)-98.1  F (36.7  C)] 98.1  F (36.7  C)  Pulse:  [59-85] 68  Resp:  [12-16] 15  BP: (113-141)/(70-80) 138/74  SpO2:  [98 %-100 %] 99 %      General Exam  General:  patient sitting up in bed eating breakfast without any acute distress    HEENT:  normocephalic/atraumatic, poor dental hygiene  Cardio:  RRR  Pulmonary:  no respiratory distress  Abdomen:  soft  Extremities:  no edema bilateral calf wasting. left arm in cast and sling.   Skin:  intact, warm/dry      Neuro Exam  Mental Status:  alert, oriented x 3, follows commands, naming and repetition normal slightly dysarthric speech  Cranial Nerves:  visual fields intact with blurriness in the left eye, reduced in LUQ since stroke. EOMI with normal smooth pursuit, facial sensation intact and symmetric, facial movements symmetric, hearing not formally tested but intact to conversation, shoulder shrug strong bilaterally, tongue protrusion midline. Slight dysarthria.   Motor:  no abnormal movements, able to move all limbs spontaneously, no pronator drift, exam of left arm limited by cast and pain. Left hand squeeze 4/5 compared 5/5 on left.  Reflexes:  no clonus  Sensory:  sensation intact, diminished on left face v1-v3 \"1%\", left UE \"2%\" , LE \"0%\" compared to right side. Left sided extinction with double simultaneously stimulation  Coordination:  normal finger-to-nose and heel-to-shin bilaterally without dysmetria  Station/Gait:  deferred  Psych: at times tearful, denies SI/HI/AVH    Stroke " Scales    NIHSS  1a. Level of Consciousness 0-->Alert, keenly responsive   1b. LOC Questions 0-->Answers both questions correctly   1c. LOC Commands 0-->Performs both tasks correctly   2.   Best Gaze 0-->Normal   3.   Visual 1-->Partial hemianopia (LUQ vision loss)   4.   Facial Palsy 0-->Normal symmetrical movements   5a. Motor Arm, Left 1-->Drift, limb holds 90 (or 45) degrees, but drifts down before full 10 seconds, does not hit bed or other support   5b. Motor Arm, Right 0-->No drift, limb holds 90 (or 45) degrees for full 10 secs   6a. Motor Leg, Left 1-->Drift, leg falls by the end of the 5-sec period but does not hit bed   6b. Motor Leg, right 0-->No drift, leg holds 30 degree position for full 5 secs   7.   Limb Ataxia 0-->Absent   8.   Sensory 1-->Mild-to-moderate sensory loss, patient feels pinprick is less sharp or is dull on the affected side, or there is a loss of superficial pain with pinprick, but patient is aware of being touched (diminished on left side)   9.   Best Language 0-->No aphasia, normal   10. Dysarthria 1-->Mild-to-moderate dysarthria, patient slurs at least some words and, at worst, can be understood with some difficulty   11. Extinction and Inattention  1-->Visual, tactile, auditory, spatial, or personal inattention or extinction to bilateral simultaneous stimulation in one of the sensory modalities (left side extinction)   Total 6 (01/11/24 1155)       Modified Whatcom Score (Pre-morbid)  3-Moderate disability; requiring some help, but able to walk without assistance    Imaging  I personally reviewed all imaging; relevant findings per HPI.     Lab Results Data   CBC  Recent Labs   Lab 01/11/24  0502 01/10/24  0656 01/09/24  0501   WBC 7.5 6.3 5.8   RBC 4.11 4.25 4.52   HGB 12.7 13.3 14.0   HCT 38.3 39.6 42.3    356 386     Basic Metabolic Panel    Recent Labs   Lab 01/11/24  0502 01/10/24  0656 01/09/24  0501    140 140   POTASSIUM 4.3 4.7 4.0   CHLORIDE 107 107 105   CO2  "26 28 27   BUN 14.9 15.1 17.3   CR 0.69 0.78 0.69   GLC 87 84 81   PB 8.5* 8.9 8.7*     Liver Panel  No results for input(s): \"PROTTOTAL\", \"ALBUMIN\", \"BILITOTAL\", \"ALKPHOS\", \"AST\", \"ALT\", \"BILIDIRECT\" in the last 168 hours.  INR    Recent Labs   Lab Test 01/02/24  2356 12/07/20  1550   INR 0.99 1      Lipid Profile    Recent Labs   Lab Test 01/03/24  0213 12/10/20  1113   CHOL 171  --    HDL 46*  --    *  --    TRIG 80 83     A1C    Recent Labs   Lab Test 01/02/24  2356   A1C 5.9*     Troponin    Recent Labs   Lab 01/07/24  0455 01/06/24  0047 01/05/24  1858   CTROPT 9 11 10          Data       "

## 2024-01-11 NOTE — PROGRESS NOTES
"Care Management Follow Up     Length of Stay (days): 8     Expected Discharge Date:   Pt medically ready for discharge as of 1/6/2024     Concerns to be Addressed:     Disposition planning  Patient plan of care discussed at interdisciplinary rounds: Yes     Anticipated Discharge Disposition:   TCU placement     Anticipated Discharge Services:   TCU placement  Anticipated Discharge DME:    Not applicable at this time     Patient/family educated on Medicare website which has current facility and service quality ratings:   No, as options will be limited due to pt's positive urine drug screen  Education Provided on the Discharge Plan:  Yes  Patient/Family in Agreement with the Plan:  Yes     Referrals Placed by CM/SW:   Post acute care facility's  Private pay costs discussed: Not applicable at this time     Additional Information:  SW is following pt for discharge planning.  Per Dr. Cota and Dr. Daly, pt is medically ready for discharge.  OT and Physical Therapy are recommending a TCU stay.    OT completed a MoCA on 1/9/2024 and pt scored 20/30 indicating mild cognitive impairment.       HECTOR completed a  \"Bed Reservation Request\" form that was submitted to Care Management Dept Management team to request consideration for admit to this program [facility] at [facility] on 1/9/2024 and learned today that pt was declined for this program as a conclusion was reached that pt is not appropriate for the program.     The following TCU's/SNF's have assessed and declined pt for admit:  - [facility], per Sanaz, pt is not appropriate for their facility  - [facility], per Clare, pt declined for admit due to CD history  - [facility], declined on line (through [employer]) due to history of drug abuse  [facility] who states that pt was assessed and declined for admit and recommends exhausting all TCU options        HECTOR placed follow up calls to " Admissions at the following TCU's/SNF's:    -  [facility] Admissions for consideration at : [facility] [place], [facility] [place], [facility] [place], [facility] [place], [facility] [place], [facility] [place] [place] [place] [place] [place] [place] [place] [place], [facility] [place], [facility], [facility] [place], [facility] [place], [facility] [place] and [facility] [place].   Bianca states that she is awaiting a response from [facility] [place] in regards to acceptance.      SW will continue to follow for discharge planning.      JOLENE Mckeon  Social Work, 6A  Phone:  [phone]  Pager:  [phone]  1/11/2024
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
## 2024-01-11 NOTE — PLAN OF CARE
Status: Admitted 1/2 with L sided weakness and a fall. Found to have R PCA infarcts and L ulnar fracture.  Vitals: VSS. SBP < 180. on RA. CCM. Continuous pulse ox.   Neuros: A&O x4. Forgetful at times. 4/5 throughout ex LUE 3/5. Decreased sensation to L side of body. Bilateral blurry vision. Intermittently tearful throughout the shift.   IV: PIV infusing 500 mL NS bolus.   Labs/Electrolytes: WNL  Resp/trach: WNL  Diet: Regular diet. Good PO  Bowel status: LBM 1/8. Passing gas.  : Voiding spontaneously  Skin: LUE splint- NWB. Nicotine patch on L arm.  Pain: L arm, rib, and hip pain managed with PRN Tylenol and Oxycodone and ice packs. PRN Atarax given x1.  Activity: A1, GB. Walked halls x1  Social: Writer talked to daughter Nichole over phone this shift.  Plan: SW following for discharge.  Updates this shift: IV benadryl, IV compazine, IV mag, and 500 mL NS bolus ordered for migraine. Flu shot given in R arm.    Goal Outcome Evaluation:      Plan of Care Reviewed With: patient    Overall Patient Progress: no change    Outcome Evaluation: Migraine and LUE pain.

## 2024-01-11 NOTE — PROGRESS NOTES
"Addiction Team Social Work Note    Date of  Intervention: 01/11/24  Collaborated with:  Tianna, Peer ; Dr. Zuñiga, Addiction Attending    Patient information: Addiction Medicine SW following for support, resources and linkage to care.    Intervention:  Chart reviewed.  Discussed pt's care and plan in Addiction team rounds.    FIGUEROA Wynn, updated writer with the following info:    Tianna states that she met with pt last evening for a supportive visit.  She states that pt informed her that she had had a CADI waiver but that it stopped.  Pt voiced interest in getting the CADI waiver re-established.    Pt voiced a worry that her social security income will be shut off if she goes to a SNF.    Pt has a PO Box in [place] for mailing purposes, but doesn't have regular access to get her mail so wants her mailing address to be switched to another address.      WRiter updated FIGUEROA Wynn, that per notes, it looks like her grandson works at [facility] and may know of Elder housing resources.    Tianna writes back to writer \"Hey, I sent an email to the main Patient Advocate housing  at [facility], John Cruz, about Elder Housing through [facility] [facility]. She responded:    Not that I am aware of, we help folks connect with Elder Housing.  I think that [employer] ([employer]) is building some kind of housing but I'm not even sure if that building is up yet.  I wonder if they were referring to either [employer] or the [employer] ( [employer])?    John did give me some info about [facility] Homeless Elders program:  They office out of [facility] on [address], contact is Kehinde Ivy, [phone]    Eligibility:  Elders experiencing homelessness in [place] or [place], shelter not required, and  specific shelter not required.   Elders 55+ and all others need to be 60+    Services:  [employer] and other general housing navigation, they are also an HSS provider " and can also help those who have done an intake access funds for deposits and Bridging if needed      One other resource- a previous [facility] Pt Advocate started her own program called [employer]. Sounds like they focus on Elders and people with disabilities. https://spiritandsand.org/          Assessment:   Care coordination.    Plan:      Follow Up:  Writer unable to see patient today for a visit.  E-mailed the above info to JOLENE Varela, in case this is helpful with any disposition.    Will continue to follow.    Due to regulation of Title 42 of the Code of Federal Regulations (CFR) Part 2: Confidentiality laws apply to this note and the information wherein.  Thus, this note cannot be copy and pasted into any other health care staff's note nor can it be included in general medical records sent to ANY outside agency without the patient's written consent.    NEHEMIAS Dean  She/her/hers  Social Work Services  Inpatient Addiction Medicine Team  [phone] work cell phone  [phone] pager  8:00am-4:30pm M/T/Th/F; Off Wednesday's

## 2024-01-11 NOTE — PROGRESS NOTES
"Care Management Discharge Note    Discharge Date:   j1/12/2024       Discharge Disposition:    [facility] ([phone])    Discharge Services:  TCU placement at [facility]    Discharge DME:    Not applicable at this time    Discharge Transportation:   HECTOR spoke with pt's floor nurse (Charlette) who  indicates that pt can transport by w/c, can maintain an upright seated position, does not require supervision during transport and can pivot transfer.  HECTOR arranged for [facility] ([facility] [phone]) to provide w/c transport with a service window  time of 11:09am - 11:54am    Private pay costs discussed: Not applicable    Does the patient's insurance plan have a 3 day qualifying hospital stay waiver?  No      PAS Confirmation Code:  [id number]  Patient/family educated on Medicare website which has current facility and service quality ratings:   No as there was a limited number of facility's that would consider pt due to history    Education Provided on the Discharge Plan:  yes  Persons Notified of Discharge Plans:  Pt, daughter (Nichole Mattson), nephew, Dr. Hsu and 6A nursing  Patient/Family in Agreement with the Plan:  yes    Handoff Referral Completed: No as pt currently does not have a PCP.  Pt will be followed by the SNF MD    Additional Information:  - Dr. Hsu confirmed readiness for discharge  - Admissions (Bianca) for [facility] SNF including [facility], confirmed acceptance for admit  - SW completed \"Important Message from Medicare\" with pt's daughter, Nichole Mattson, as per pt's stated preference  - SW will fax pt's completed/signed discharge orders and discharge summary to [facility] in the am.    JOLENE Mckeon  Social Work, 6A  Phone:  [phone]  Pager:  [phone]  1/11/2024       MARU Oleary     "

## 2024-01-12 VITALS
SYSTOLIC BLOOD PRESSURE: 146 MMHG | TEMPERATURE: 97.9 F | HEIGHT: 63 IN | OXYGEN SATURATION: 100 % | BODY MASS INDEX: 17.72 KG/M2 | DIASTOLIC BLOOD PRESSURE: 91 MMHG | WEIGHT: 100 LBS | RESPIRATION RATE: 16 BRPM | HEART RATE: 74 BPM

## 2024-01-12 LAB
ANION GAP SERPL CALCULATED.3IONS-SCNC: 9 MMOL/L (ref 7–15)
BUN SERPL-MCNC: 13.2 MG/DL (ref 8–23)
CALCIUM SERPL-MCNC: 8.4 MG/DL (ref 8.8–10.2)
CHLORIDE SERPL-SCNC: 107 MMOL/L (ref 98–107)
CREAT SERPL-MCNC: 0.7 MG/DL (ref 0.51–0.95)
DEPRECATED HCO3 PLAS-SCNC: 25 MMOL/L (ref 22–29)
EGFRCR SERPLBLD CKD-EPI 2021: >90 ML/MIN/1.73M2
ERYTHROCYTE [DISTWIDTH] IN BLOOD BY AUTOMATED COUNT: 12.7 % (ref 10–15)
GLUCOSE SERPL-MCNC: 86 MG/DL (ref 70–99)
HCT VFR BLD AUTO: 38.5 % (ref 35–47)
HGB BLD-MCNC: 12.7 G/DL (ref 11.7–15.7)
MAGNESIUM SERPL-MCNC: 1.9 MG/DL (ref 1.7–2.3)
MCH RBC QN AUTO: 30.8 PG (ref 26.5–33)
MCHC RBC AUTO-ENTMCNC: 33 G/DL (ref 31.5–36.5)
MCV RBC AUTO: 93 FL (ref 78–100)
PLATELET # BLD AUTO: 329 10E3/UL (ref 150–450)
POTASSIUM SERPL-SCNC: 4.3 MMOL/L (ref 3.4–5.3)
RBC # BLD AUTO: 4.13 10E6/UL (ref 3.8–5.2)
SODIUM SERPL-SCNC: 141 MMOL/L (ref 135–145)
WBC # BLD AUTO: 7.1 10E3/UL (ref 4–11)

## 2024-01-12 PROCEDURE — 250N000013 HC RX MED GY IP 250 OP 250 PS 637

## 2024-01-12 PROCEDURE — 83735 ASSAY OF MAGNESIUM: CPT

## 2024-01-12 PROCEDURE — 99239 HOSP IP/OBS DSCHRG MGMT >30: CPT | Mod: GC | Performed by: PSYCHIATRY & NEUROLOGY

## 2024-01-12 PROCEDURE — 85027 COMPLETE CBC AUTOMATED: CPT

## 2024-01-12 PROCEDURE — 80048 BASIC METABOLIC PNL TOTAL CA: CPT

## 2024-01-12 PROCEDURE — 36415 COLL VENOUS BLD VENIPUNCTURE: CPT

## 2024-01-12 PROCEDURE — 250N000013 HC RX MED GY IP 250 OP 250 PS 637: Performed by: PSYCHIATRY & NEUROLOGY

## 2024-01-12 RX ORDER — ROSUVASTATIN CALCIUM 20 MG/1
20 TABLET, COATED ORAL DAILY
DISCHARGE
Start: 2024-01-12

## 2024-01-12 RX ORDER — OXYCODONE HYDROCHLORIDE 5 MG/1
2.5 TABLET ORAL EVERY 6 HOURS PRN
Status: SHIPPED | DISCHARGE
Start: 2024-01-12

## 2024-01-12 RX ORDER — PREGABALIN 25 MG/1
25 CAPSULE ORAL 2 TIMES DAILY
Status: SHIPPED | DISCHARGE
Start: 2024-01-12 | End: 2024-01-12

## 2024-01-12 RX ORDER — PREGABALIN 25 MG/1
25 CAPSULE ORAL 2 TIMES DAILY
Status: SHIPPED | DISCHARGE
Start: 2024-01-12

## 2024-01-12 RX ORDER — OXYCODONE HYDROCHLORIDE 5 MG/1
5 TABLET ORAL EVERY 6 HOURS PRN
Status: SHIPPED | DISCHARGE
Start: 2024-01-12 | End: 2024-01-12

## 2024-01-12 RX ORDER — OXYCODONE HYDROCHLORIDE 5 MG/1
2.5 TABLET ORAL EVERY 6 HOURS PRN
Status: SHIPPED | DISCHARGE
Start: 2024-01-12 | End: 2024-01-12

## 2024-01-12 RX ADMIN — ASPIRIN 81 MG CHEWABLE TABLET 81 MG: 81 TABLET CHEWABLE at 07:53

## 2024-01-12 RX ADMIN — ROSUVASTATIN CALCIUM 20 MG: 20 TABLET, FILM COATED ORAL at 07:53

## 2024-01-12 RX ADMIN — OXYCODONE HYDROCHLORIDE 5 MG: 5 TABLET ORAL at 03:04

## 2024-01-12 RX ADMIN — APIXABAN 5 MG: 5 TABLET, FILM COATED ORAL at 07:53

## 2024-01-12 RX ADMIN — ACETAMINOPHEN 650 MG: 325 TABLET, FILM COATED ORAL at 03:04

## 2024-01-12 RX ADMIN — OXYCODONE HYDROCHLORIDE 5 MG: 5 TABLET ORAL at 07:56

## 2024-01-12 RX ADMIN — HYDROXYZINE HYDROCHLORIDE 25 MG: 25 TABLET, FILM COATED ORAL at 01:11

## 2024-01-12 RX ADMIN — ACETAMINOPHEN 650 MG: 325 TABLET, FILM COATED ORAL at 07:56

## 2024-01-12 RX ADMIN — PREGABALIN 25 MG: 25 CAPSULE ORAL at 07:53

## 2024-01-12 ASSESSMENT — ACTIVITIES OF DAILY LIVING (ADL)
ADLS_ACUITY_SCORE: 27

## 2024-01-12 ASSESSMENT — PATIENT HEALTH QUESTIONNAIRE - PHQ9: SUM OF ALL RESPONSES TO PHQ QUESTIONS 1-9: 20

## 2024-01-12 NOTE — CONSULTS
Occupational Therapy Discharge Summary    Reason for therapy discharge:    Discharged to transitional care facility.    Progress towards therapy goal(s). See goals on Care Plan in [facility] electronic health record for goal details.  Goals partially met.  Barriers to achieving goals:   discharge from facility.    Therapy recommendation(s):    Continued therapy is recommended.  Rationale/Recommendations:  to maximize ADL/IADL I.

## 2024-01-12 NOTE — PROGRESS NOTES
Discharge time/date: 1/12/24 1145  Walked or Wheelchair: Wheelchair ride with EMS transport  PIV removed: Yes  Reviewed AVS with patient: NA - going to TCU  Medication due times added to AVS in EPIC: NA  Verbalized understanding of discharge with teachback: KRYSTIAN  Medications retrieved from pharmacy: NA  Supplies sent home: Sling on pt, no other supplies  Belongings from security with patient: No belongings in security, but all belongings sent from room    Report called to KATIANA Booker at [facility] and all questions answered.

## 2024-01-12 NOTE — PLAN OF CARE
Status: Admitted 1/2 with L sided weakness and a fall. Found to have R PCA infarcts and L ulnar fracture.  Vitals: VSS ex HTN within parameters. CCM   Neuros: A&O x4, intermittent illogical/tangential speech. Decreased sensation to L side of body. LUE 3/5, all other strengths 4/5. Decreased sensation to L side of body. Agitated and tearful this shift after family visit.    IV: PIV SL   Resp/trach: On RA. Denies SOB   Diet: Regular diet   Bowel status: LBM 1/10. Pt c/o constipation symptoms, refused scheduled miralax.   : Voiding spontaneously  Skin: Nicotine patch on R arm. Splint on LUE  Pain: L arm pain managed with PRN tylenol and oxycodone. One time dose of PO zyprexa given for agitation. PRN Atarax given for anxiety.  Activity: A1/GB. Pt showered this [place]  Social: Family visited   Plan: Discharge tomorrow AM to TCU

## 2024-01-12 NOTE — PLAN OF CARE
Status: Admitted 1/2 with L side weakness and a fall.   Found to have subacute R PCA infarcts and L ulnar fracture.     Vitals: VSS on RA.  CCM. Continuous pulse ox.  Neuros: A&Ox4.  Strength 4/5 ex LUE 3/5.  Decreased sensation to L side of body.  Blurry vision L eye. Intermittent illogical/tangential speech.  IV: PIV SL  Resp/trach: WDL  Diet: regular  Bowel status: LBM 1/10  : voiding spont, to toilet  Skin: splint to LUE- NWB.  Patient needs reminding to not use LUE. Nicotene patch R shoulder.   Pain: pain to LUE, managed with oxycodone and tylenol. PRN Atarax given for anxiety.  Activity: A1/GB/walker  Plan: Discharge today to TCU.    Stroke Patients:   Required education completed: stroke booklet at bedside. Refused teaching.    Pneumoboots in place: refused

## 2024-01-12 NOTE — PROGRESS NOTES
Brief entry:  At 10:12am, HECTOR faxed pt's completed/signed discharge orders and summary to Admissions (Bianca) for [facility].    Pt's narcotic script faxed at 10:22am when signed scripts became available.  HECTOR phoned Admissions (Bianca) for [facility] and left a voice mail informing that the above documents have been faxed.    JOLENE Mckeon  Social Work, 6A  Phone:  [phone]  Pager:  [phone]  1/12/2024

## 2024-01-15 ENCOUNTER — TELEPHONE (OUTPATIENT)
Dept: ORTHOPEDICS | Facility: CLINIC | Age: 64
End: 2024-01-15

## 2024-01-15 ENCOUNTER — TELEPHONE (OUTPATIENT)
Dept: ORTHOPEDICS | Facility: CLINIC | Age: 64
End: 2024-01-15
Payer: MEDICARE

## 2024-01-15 NOTE — TELEPHONE ENCOUNTER
LISSETH to schedule appt for Nondisplaced left distal ulnar shaft fracture, DOI: 1/2/24 with Dr. Obando on 1/17 (please use 12pm slot first, if not available same day spot okay) Contact info provided

## 2024-01-15 NOTE — TELEPHONE ENCOUNTER
Nondisplaced left distal ulnar shaft fracture, DOI: 1/2/24  CVA 1/2/23.    Discussed with Dr. Fountain today in clinic and Pt can be seen by sports medicine for follow up. Fracture is not surgical and she also just had CVA.     Jason Lyons, RNCC

## 2024-01-16 ENCOUNTER — TELEPHONE (OUTPATIENT)
Dept: ORTHOPEDICS | Facility: CLINIC | Age: 64
End: 2024-01-16
Payer: MEDICARE

## 2024-01-16 ENCOUNTER — MYC MEDICAL ADVICE (OUTPATIENT)
Dept: NEUROLOGY | Facility: CLINIC | Age: 64
End: 2024-01-16
Payer: MEDICARE

## 2024-01-16 ENCOUNTER — TELEPHONE (OUTPATIENT)
Dept: NEUROLOGY | Facility: CLINIC | Age: 64
End: 2024-01-16
Payer: MEDICARE

## 2024-01-16 NOTE — TELEPHONE ENCOUNTER
HALM to schedule appt on 1/17 with Obmanavtar for Nondisplaced left distal ulnar shaft fracture, DOI: 1/2/24. Contact info provided. MAX attempts made to schedule

## 2024-01-16 NOTE — TELEPHONE ENCOUNTER
Sent Susana (1st Attempt) for the patient to call back and schedule the following:    Appointment type: New Neurology  Provider: Any general, any location  Return date: First Avail  Specialty phone number: [phone]  Additional appointment(s) needed: NA  Additonal Notes: Call back to schedule referral    Unable to LVM, Sent Shon Webster on 1/16/2024 at 2:53 PM

## 2024-01-22 ENCOUNTER — HOSPITAL ENCOUNTER (EMERGENCY)
Facility: CLINIC | Age: 64
Discharge: HOME | End: 2024-01-23
Attending: EMERGENCY MEDICINE | Admitting: EMERGENCY MEDICINE
Payer: MEDICARE

## 2024-01-22 VITALS
OXYGEN SATURATION: 98 % | RESPIRATION RATE: 16 BRPM | DIASTOLIC BLOOD PRESSURE: 68 MMHG | HEART RATE: 88 BPM | TEMPERATURE: 98.2 F | SYSTOLIC BLOOD PRESSURE: 121 MMHG

## 2024-01-22 DIAGNOSIS — S52.602A CLOSED FRACTURE OF DISTAL END OF LEFT ULNA, UNSPECIFIED FRACTURE MORPHOLOGY, INITIAL ENCOUNTER: Primary | ICD-10-CM

## 2024-01-22 PROCEDURE — 29125 APPL SHORT ARM SPLINT STATIC: CPT | Mod: LT | Performed by: EMERGENCY MEDICINE

## 2024-01-22 PROCEDURE — 99284 EMERGENCY DEPT VISIT MOD MDM: CPT | Performed by: EMERGENCY MEDICINE

## 2024-01-22 PROCEDURE — 99284 EMERGENCY DEPT VISIT MOD MDM: CPT | Mod: 25 | Performed by: EMERGENCY MEDICINE

## 2024-01-22 ASSESSMENT — ACTIVITIES OF DAILY LIVING (ADL): ADLS_ACUITY_SCORE: 35

## 2024-01-22 NOTE — ED TRIAGE NOTES
"Writer went to see patient as she was leaving [facility] prior to being triaged. Writer asked pt where she was going, pt stated \"I'm rolling a cigarette you bitch\". Writer then stated she wanted to make sure she was OK. Pt stated she is \"fine\" and she has the right to smoke if she wants to.         "

## 2024-01-22 NOTE — ED TRIAGE NOTES
"Comes from [facility] for cast replacement on left arm. Per report she removed cast herself. Pt states she has a \"leg infection.\"         "

## 2024-01-23 NOTE — ED PROVIDER NOTES
"    [place] EMERGENCY DEPARTMENT ([facility])    1/22/24       ED PROVIDER NOTE   History     Chief Complaint   Patient presents with    Cast Repair     HPI  Louann Lofton is a 63 year old female with a past medical history of HLD, tobacco use disorder, ischemic cardiomyopathy with LV thrombus for which she completed 3 months of warfarin, atrial fibrillation not on anticoagulation, migraine without aura, homelessness, Nissen fundlopication 2009 with chronic abdominal pain who presents to the ED for cast repair. Patient recently had a distal left ulnar fracture due to a cerebrovascular accident on 1/2/24 and would like a new splint. Patient states her splint came off yesterday and endorses itchiness. Patient reports she had not followed up with orthopedic. Patient states the \"place she was at sucks and she doesn't want to go back\".       Past Medical History  Past Medical History:   Diagnosis Date    Abdominal pain, left upper quadrant 10/21/2010    Acute myocardial infarction 4/23-4/26/11    Hospitalized @ 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    Acute coronary syndrome-[place]    Coronary artery disease     Diaphragmatic hernia without mention of obstruction or gangrene 5/2/06    Dyslipidemia     Eczema herpeticum     Esophageal reflux     Folds in Descemet's membrane 10/9/2008    Hyperlipidemia     HYPERLIPIDEMIA NEC/NOS 9/25/2006    Irritable bowel syndrome     Ischemic cardiomyopathy     [place] 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, ejection fraction 48%    LV thrombus 12/1/2006    Major depressive disorder, single episode, severe, without mention of psychotic behavior 2006    major depressive disorder    MI NOS EPISODE CARE UNSPEC 3/6/2006    Mitral valve disorders(424.0) 2/25/06    Hosp - apical " ballooning syndrome and acute coronary syndrome    Palpitations     PARASITIC DISEASE NEC, Blastocystis hominis 2006    Pure hypercholesterolemia     RECURR DEPR PSYCHOS-UNSP 3/20/2006    SEQUELAE BHAVIN INFRCT,OTHR NEC 2006    Tobacco abuse     Tobacco use disorder      Past Surgical History:   Procedure Laterality Date    HC ESOPHAGOSCOPY, DIAGNOSTIC  06    HC REMOVAL GALLBLADDER       acetaminophen (TYLENOL) 500 MG tablet  apixaban ANTICOAGULANT (ELIQUIS) 5 MG tablet  aspirin (ASA) 81 MG chewable tablet  metoprolol succinate ER (TOPROL XL) 25 MG 24 hr tablet  nicotine (NICODERM CQ) 14 MG/24HR 24 hr patch  omeprazole 20 MG tablet  oxyCODONE (ROXICODONE) 5 MG tablet  pregabalin (LYRICA) 25 MG capsule  QUEtiapine (SEROQUEL) 50 MG tablet  rosuvastatin (CRESTOR) 20 MG tablet  tiZANidine (ZANAFLEX) 4 MG tablet      Allergies   Allergen Reactions    Neurontin [Gabapentin Enacarbil] Other (See Comments)     Pt was admitted for weakness at [facility] after starting Neurontin.    Aleve [Naproxen Sodium] Hives and Swelling     lips swelled    Nsaids      Noted in 08 ER    Paxil [Paroxetine]      rash    Ultram [Tramadol Hcl] Swelling     Family History  Family History   Problem Relation Age of Onset    Cancer Mother         ovarian    C.A.D. Mother     Breast Cancer Mother     Respiratory Mother         COPD    Gastrointestinal Disease Daughter         Bleeding ulcers    Gastrointestinal Disease Daughter         Colitis     Social History   Social History     Tobacco Use    Smoking status: Every Day     Packs/day: 0.50     Years: 20.00     Additional pack years: 0.00     Total pack years: 10.00     Types: Cigarettes     Last attempt to quit: 2011     Years since quittin.7    Smokeless tobacco: Never    Tobacco comments:     On and off   Substance Use Topics    Alcohol use: Yes     Comment: denies at this time, hx of abuse, none at this time    Drug use: No         A medically appropriate  review of systems was performed with pertinent positives and negatives noted in the HPI, and all other systems negative.    Physical Exam   BP: 121/68  Pulse: 88  Temp: 98.2  F (36.8  C)  Resp: 16  SpO2: 98 %  Physical Exam  Vitals and nursing note reviewed.   Constitutional:       General: She is not in acute distress.     Appearance: Normal appearance.   HENT:      Head: Normocephalic.      Nose: Nose normal.   Eyes:      Pupils: Pupils are equal, round, and reactive to light.   Cardiovascular:      Rate and Rhythm: Normal rate and regular rhythm.   Pulmonary:      Effort: Pulmonary effort is normal.   Abdominal:      General: There is no distension.   Musculoskeletal:         General: No deformity. Normal range of motion.        Hands:       Cervical back: Normal range of motion.   Skin:     General: Skin is warm.   Neurological:      Mental Status: She is alert and oriented to person, place, and time.   Psychiatric:         Mood and Affect: Mood normal.         ED Course, Procedures, & Data             No results found for any visits on 01/22/24.  Medications - No data to display  Labs Ordered and Resulted from Time of ED Arrival to Time of ED Departure - No data to display  No orders to display          Critical care was not performed.     Medical Decision Making  The patient's presentation was of moderate complexity (sequale from original injury).    The patient's evaluation involved:  review of 1 test result(s) ordered prior to this encounter (see separate area of note for details)    The patient's management necessitated moderate risk (a decision regarding minor procedure (splint application that pt ultimately refused) with risk factors of cardiac disease).    Assessment & Plan    Patient presents to the ED for a new splint.  She had a distal ulna fracture after a fall on her recent ED visit.  Independent review of x-ray images from 1/2/2024 showed nondisplaced distal ulna fracture without significant  "rotation or misalignment.  She was placed in a short arm sandwich splint and discharged with orthopedic follow-up.  She states that she removed the splint because it was bothering her.  She has not yet followed up with orthopedics.  She is unsure if she has a follow-up appointment scheduled.    On exam, she has tenderness over the distal ulna without obvious deformities.  No skin tenting.  Neurovascularly intact.  Low suspicion for any negative sequela.  I recommended repeat short arm splint or ulnar gutter splint.  Patient refuses.  She says she does not want any plaster or Ortho-Glass because \"that shit burns\".  She is requesting an Ace bandage.  I told her that that would be an adequate and could put her at risk of significant complication if her fracture became misaligned.  Ultimately, she did agree to a prefabricated wrist splint.  I did reiterate that this is not appropriate treatment but is certainly better than an Ace wrap.  I placed referral for urgent Ortho follow-up given that her injury is almost 3 weeks out.  Discharged back to TCU with splint/sling.      I have reviewed the nursing notes. I have reviewed the findings, diagnosis, plan and need for follow up with the patient.    New Prescriptions    No medications on file       Final diagnoses:   Closed fracture of distal end of left ulna, unspecified fracture morphology, initial encounter   IAurelia, am serving as a trained medical scribe to document services personally performed by Frederick Martinez DO, based on the provider's statements to me.     Frederick COFFMAN DO, was physically present and have reviewed and verified the accuracy of this note documented by Aurelia Faye.     Frederick Martinez DO  [facility] EMERGENCY DEPARTMENT  1/22/2024     Frederick Martinez DO  01/22/24 [id number]    "

## 2024-01-23 NOTE — DISCHARGE INSTRUCTIONS
Be sure to wear your splint as advised.  Follow-up with the orthopedic service as soon as possible for reassessment of your injury and formal cast placement.    The [facility]  will call you to coordinate your care as prescribed by your provider. A representative will call you within 2 business days to help you schedule your appointment, or you may contact the  Representative at: [phone].

## 2024-05-01 ENCOUNTER — NURSE TRIAGE (OUTPATIENT)
Dept: NURSING | Facility: CLINIC | Age: 64
End: 2024-05-01
Payer: MEDICARE

## 2024-05-01 ENCOUNTER — OFFICE VISIT (OUTPATIENT)
Dept: URGENT CARE | Facility: URGENT CARE | Age: 64
End: 2024-05-01
Payer: MEDICARE

## 2024-05-01 VITALS
BODY MASS INDEX: 20.38 KG/M2 | HEART RATE: 105 BPM | OXYGEN SATURATION: 96 % | TEMPERATURE: 98.2 F | DIASTOLIC BLOOD PRESSURE: 70 MMHG | RESPIRATION RATE: 16 BRPM | WEIGHT: 115 LBS | HEIGHT: 63 IN | SYSTOLIC BLOOD PRESSURE: 112 MMHG
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
## 2024-05-01 DIAGNOSIS — L08.9 ABRASION OF FACE WITH INFECTION, INITIAL ENCOUNTER: Primary | ICD-10-CM

## 2024-05-01 DIAGNOSIS — S10.81XA ABRASION OF FACE WITH INFECTION, INITIAL ENCOUNTER: Primary | ICD-10-CM

## 2024-05-01 DIAGNOSIS — Z79.01 ANTICOAGULATED: ICD-10-CM

## 2024-05-01 PROCEDURE — 99204 OFFICE O/P NEW MOD 45 MIN: CPT | Performed by: NURSE PRACTITIONER

## 2024-05-01 RX ORDER — PENICILLIN V POTASSIUM 500 MG/1
500 TABLET, FILM COATED ORAL 2 TIMES DAILY
Qty: 14 TABLET | Refills: 0 | Status: SHIPPED | OUTPATIENT
Start: 2024-05-01 | End: 2024-05-08
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
## 2024-05-01 NOTE — PATIENT INSTRUCTIONS
ER if worsening pain, fevers, vision loss  Understand urgent care is limited without CT scan, social work

## 2024-05-16 ENCOUNTER — APPOINTMENT (OUTPATIENT)
Dept: GENERAL RADIOLOGY | Facility: CLINIC | Age: 64
End: 2024-05-16
Payer: MEDICARE

## 2024-05-16 ENCOUNTER — HOSPITAL ENCOUNTER (EMERGENCY)
Facility: CLINIC | Age: 64
Discharge: HOME | End: 2024-05-16
Payer: MEDICARE

## 2024-05-16 VITALS
TEMPERATURE: 98.9 F | RESPIRATION RATE: 22 BRPM | HEART RATE: 78 BPM | OXYGEN SATURATION: 98 % | DIASTOLIC BLOOD PRESSURE: 70 MMHG | SYSTOLIC BLOOD PRESSURE: 123 MMHG

## 2024-05-16 DIAGNOSIS — M79.672 LEFT FOOT PAIN: ICD-10-CM

## 2024-05-16 PROCEDURE — 73630 X-RAY EXAM OF FOOT: CPT | Mod: LT

## 2024-05-16 PROCEDURE — 250N000013 HC RX MED GY IP 250 OP 250 PS 637

## 2024-05-16 PROCEDURE — G0463 HOSPITAL OUTPT CLINIC VISIT: HCPCS

## 2024-05-16 PROCEDURE — 99213 OFFICE O/P EST LOW 20 MIN: CPT

## 2024-05-16 RX ORDER — ACETAMINOPHEN 500 MG
1000 TABLET ORAL ONCE
Status: COMPLETED | OUTPATIENT
Start: 2024-05-16 | End: 2024-05-16

## 2024-05-16 RX ADMIN — ACETAMINOPHEN 1000 MG: 500 TABLET, FILM COATED ORAL at 13:30

## 2024-05-16 ASSESSMENT — ACTIVITIES OF DAILY LIVING (ADL): ADLS_ACUITY_SCORE: 38

## 2024-05-16 NOTE — CONSULTS
Care Management Note:     CM received referral for housing/lodging needs. Patient lives in an RV and needs somewhere to park it. CM informed nurse that we are unaware of of places to park her RV but she could try the truck stop in [place].     Homeless resources placed in AVS.     Wendy Tirado RN  Care Transitions  [phone]
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
## 2024-05-16 NOTE — ED PROVIDER NOTES
History     Chief Complaint   Patient presents with    Foot Injury     Left foot injury , onset today   Patient states she stood up and her left foot rolled and has been experiencing severe pain and unable to walk      HPI  Louann Lofton is a 63 year old female who presents for evaluation of left foot after an injury that occurred yesterday.  Reports she stood up and her left foot rolled under her, thinks she heard a crack, and now she has pain along the dorsal lateral side of left foot.  No other injuries or other areas of acute pain.  She is also having pain with weightbearing.  Pain does not extend into the ankle or left leg. She hasn't tried anything for her symptoms yet. Denies any new numbness or tingling, paresthesias, coolness, or pallor of extremity.     Patient did have a stroke a few months ago and reports ongoing left-sided weakness which she thinks is contributing to the injury that she sustained yesterday.  Patient also reports she is homeless and staying in a mobile home right now, is looking for housing resources.    Allergies:  Allergies   Allergen Reactions    Neurontin [Gabapentin Enacarbil] Other (See Comments)     Pt was admitted for weakness at [facility] after starting Neurontin.    Aleve [Naproxen Sodium] Hives and Swelling     lips swelled    Nsaids      Noted in 5/18/08 ER    Paxil [Paroxetine]      rash    Ultram [Tramadol Hcl] Swelling       Problem List:    Patient Active Problem List    Diagnosis Date Noted    Acute CVA (cerebrovascular accident) (H) 01/03/2024     Priority: Medium    Ischemic cardiomyopathy 06/22/2015     Priority: Medium     Overview:   [place] 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, ejection fraction 48%      Hx of 3 myocardial infarction 05/23/2013     Priority: Medium     x2 last April 2011       Chronic Abdominal pain 05/23/2013     Priority: Medium     DIRE score: 11 - not a suitable candidate for long term opioid analgesia Had pain  contract with her primary care physician in [employer] but he has now retired. Per chart review on care everywhere, patient was receiving Percocet 5/325 1 tab twice daily as needed for abdominal pain. (Treating for pain 2/2 abdominal wall hernias - declined surgery at [facility] because she could not afford it. She was referred to [facility] as well back in 2013 but did not establish care .       Female climacteric state 11/10/2011     Priority: Medium    Asthma, mild intermittent 10/05/2009     Priority: Medium    Folds in Descemet's membrane 10/09/2008     Priority: Medium    Other certain sequelae of myocardial infarction, not elsewhere classified 11/29/2006     Priority: Medium     left ventricular apical thrombus      Esophageal reflux 09/25/2006     Priority: Medium    Hyperlipidemia 09/25/2006     Priority: Medium     Problem list name updated by automated process. Provider to review      Infectious and parasitic disease 04/27/2006     Priority: Medium     On stool O & P  Problem list name updated by automated process. Provider to review      Major depressive disorder, recurrent episode (H24) 03/20/2006     Priority: Medium     Problem list name updated by automated process. 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    Coronary artery disease     Diaphragmatic hernia without mention of obstruction or gangrene 5/2/06    Dyslipidemia     Eczema herpeticum     Esophageal reflux     Folds in Descemet's membrane 10/9/2008    Hyperlipidemia     HYPERLIPIDEMIA NEC/NOS 9/25/2006    Irritable bowel syndrome      Ischemic cardiomyopathy     LV thrombus 2006    Major depressive disorder, single episode, severe, without mention of psychotic behavior     MI NOS EPISODE CARE UNSPEC 3/6/2006    Mitral valve disorders(424.0) 06    Palpitations     PARASITIC DISEASE NEC, Blastocystis hominis 2006    Pure hypercholesterolemia     RECURR DEPR PSYCHOS-UNSP 3/20/2006    SEQUELAE BHAVIN INFRCT,OTHR NEC 2006    Tobacco abuse     Tobacco use disorder        Past Surgical History:    Past Surgical History:   Procedure Laterality Date    HC ESOPHAGOSCOPY, DIAGNOSTIC  06    HC REMOVAL GALLBLADDER         Family History:    Family History   Problem Relation Age of Onset    Cancer Mother         ovarian    C.A.D. Mother     Breast Cancer Mother     Respiratory Mother         COPD    Gastrointestinal Disease Daughter         Bleeding ulcers    Gastrointestinal Disease Daughter         Colitis       Social History:  Marital Status:  Single [1]  Social History     Tobacco Use    Smoking status: Every Day     Current packs/day: 0.00     Average packs/day: 0.5 packs/day for 20.0 years (10.0 ttl pk-yrs)     Types: Cigarettes     Start date: 1991     Last attempt to quit: 2011     Years since quittin.0    Smokeless tobacco: Never    Tobacco comments:     On and off   Substance Use Topics    Alcohol use: Yes     Comment: denies at this time, hx of abuse, none at this time    Drug use: No        Medications:    acetaminophen (TYLENOL) 500 MG tablet  apixaban ANTICOAGULANT (ELIQUIS) 5 MG tablet  aspirin (ASA) 81 MG chewable tablet  metoprolol succinate ER (TOPROL XL) 25 MG 24 hr tablet  nicotine (NICODERM CQ) 14 MG/24HR 24 hr patch  omeprazole 20 MG tablet  oxyCODONE (ROXICODONE) 5 MG tablet  pregabalin (LYRICA) 25 MG capsule  QUEtiapine (SEROQUEL) 50 MG tablet  rosuvastatin (CRESTOR) 20 MG tablet  tiZANidine (ZANAFLEX) 4 MG tablet          Review of Systems  Pertinent review of systems as documented per HPI  above.    Physical Exam   BP: 123/70  Pulse: 78  Temp: 98.9  F (37.2  C)  Resp: 22  SpO2: 98 %      Physical Exam  Vitals and nursing note reviewed.   Constitutional:       General: She is not in acute distress.     Appearance: Normal appearance. She is not ill-appearing, toxic-appearing or diaphoretic.   HENT:      Head: Normocephalic and atraumatic.   Cardiovascular:      Rate and Rhythm: Normal rate.      Pulses:           Dorsalis pedis pulses are 2+ on the left side.        Posterior tibial pulses are 2+ on the left side.   Pulmonary:      Effort: Pulmonary effort is normal. No respiratory distress.      Breath sounds: Normal breath sounds.   Musculoskeletal:      Left knee: Normal.      Left lower leg: Normal. No swelling, deformity, lacerations, tenderness or bony tenderness. No edema.      Left ankle: Normal. No swelling, deformity, ecchymosis or lacerations. No tenderness. Normal range of motion. Normal pulse.      Left Achilles Tendon: No tenderness or defects.      Left foot: Decreased range of motion. Normal capillary refill. Swelling, tenderness and bony tenderness present. No deformity or prominent metatarsal heads. Normal pulse.      Comments: TTP along 4th-5th metatarsals of L foot.   Feet:      Left foot:      Skin integrity: Skin integrity normal.   Skin:     General: Skin is warm and dry.      Capillary Refill: Capillary refill takes less than 2 seconds.      Findings: No bruising or erythema.   Neurological:      Mental Status: She is alert and oriented to person, place, and time. Mental status is at baseline.   Psychiatric:         Mood and Affect: Mood normal.         Behavior: Behavior normal.         ED Course       Results for orders placed or performed during the hospital encounter of 05/16/24 (from the past 24 hour(s))   Foot XR, G/E 3 views, left    Narrative    FOOT THREE VIEWS LEFT  5/16/2024 1:42 PM     HISTORY: twisted yesterday pain along lateral foot  COMPARISON: None.       Impression    IMPRESSION: No acute fracture or malalignment. Mild multifocal IP  joint degenerative changes. Osteopenia.    JASPAL KENNEY MD         SYSTEM ID:  [id number]       Medications   acetaminophen (TYLENOL) tablet 1,000 mg (1,000 mg Oral $Given 5/16/24 1854)       Assessments & Plan (with Medical Decision Making)     63-year-old female who presents for evaluation of left foot pain after an injury that occurred yesterday.  Reports that when she stood up her left foot rolled under her, reports hearing a crack, and now having pain along the dorsal lateral side of left foot.  Also having pain with weightbearing.  No other injuries.  Has not tried anything for symptoms yet.    On exam, there is visible swelling along left foot without any laceration, deformity.  Decreased ROM at the foot with localized TTP along 4th-5th metatarsals.  No swelling or tenderness or decreased range of motion at the left ankle or calf. Rest of exam as above. Independent review of left foot Xray did not show any acute fractures or malalignment.  Discussed supportive cares for patient's symptoms including oral analgesics for pain relief, RICE and follow-up with orthopedics if pain does not improve. Advised that if symptoms do not improve despite the recommended treatment, or if patient develops any new or worsening symptoms, that they return for further evaluation.     Patient is requesting resources for housing accommodations as she does not have stable housing at the moment.  Social work consulted and they provided resources and advised patient to speak with [facility] for further aid. All questions answered.  Patient verbalizes understanding and agreement with the above plan.    I have reviewed the nursing notes.    I have reviewed the findings, diagnosis, plan and need for follow up with the patient.    Discharge Medication List as of 5/16/2024  2:23 PM          Final diagnoses:   Left foot pain       5/16/2024   [facility]  [facility]       Alicia Hartmann PA-C  05/16/24 1415       Alicia Hartmann PA-C  05/16/24 1171

## 2024-08-31 ENCOUNTER — HEALTH MAINTENANCE LETTER (OUTPATIENT)
Age: 64
End: 2024-08-31

## 2024-09-12 ENCOUNTER — TELEPHONE (OUTPATIENT)
Dept: FAMILY MEDICINE | Facility: OTHER | Age: 64
End: 2024-09-12
Payer: MEDICARE

## 2024-09-12 NOTE — TELEPHONE ENCOUNTER
Patient calling wanting to know if she is on the hospice program as she states she is homeless and has a blood clot. She notes that she was just at a facility and they would not release her records. RN was unsure what patient was referring to and asked patient to explain. Patient was becoming frustrated and stated she needs records from Dr. Quinones.     RN advised patient that it looks like she is not regularly seen within  with [facility] and we would need a visit to determine if she is eligible for hospice. Patient was yelling at RN stating she was just seen by Dr. Quinones with [facility]. RN could not see this in patient's chart and explained that the last visit within the system was at ED on 5/16/24. RN explained that it looks like Dr. Quinones may be with another system. Patient began cussing at RN regarding this. RN advised patient that provider is not with [facility] and patient then hung up on RN.     RADHIKA CardN, RN

## 2024-12-03 ENCOUNTER — NURSE TRIAGE (OUTPATIENT)
Dept: FAMILY MEDICINE | Facility: CLINIC | Age: 64
End: 2024-12-03
Payer: MEDICARE

## 2024-12-03 NOTE — TELEPHONE ENCOUNTER
"Patient called back and reported that she had severe shortness of breath last night. Went to bed and woke up this morning and stated that she was \"surprised she woke up\". RN advised patient if she is still experiencing severe shortness of breath, then she needs to call 911 right away. Patient declined and stated it's not as bad as last night. Stated that it is more moderate now. RN still advised patient to go to the emergency room. Patient continually declined being seen.     Patient had several questions regarding the medications she is currently taking. RN advised patient to call provider's clinic that she saw in April 2024. Gave her phone number for this clinic. Patient not seen with [facility] in 9 years.     Again advise patient to be seen in the emergency room but patient continually declined. Did not want RN calling 911 for her. Patient ended up saying \"you're a deal animal\" and then ended up disconnecting the line.     JAYNE Angel, RN   [facility]    "

## 2024-12-03 NOTE — TELEPHONE ENCOUNTER
"Reason for Disposition    MODERATE difficulty breathing (e.g., speaks in phrases, SOB even at rest, pulse 100-120) of new-onset or worse than normal    Answer Assessment - Initial Assessment Questions  1. RESPIRATORY STATUS: \"Describe your breathing?\" (e.g., wheezing, shortness of breath, unable to speak, severe coughing)       Shortness of breath at rest. Wheezing yesterday.   2. ONSET: \"When did this breathing problem begin?\"       Last night  3. PATTERN \"Does the difficult breathing come and go, or has it been constant since it started?\"       Unsure  4. SEVERITY: \"How bad is your breathing?\" (e.g., mild, moderate, severe)       Moderate   5. RECURRENT SYMPTOM: \"Have you had difficulty breathing before?\" If Yes, ask: \"When was the last time?\" and \"What happened that time?\"       *No Answer*  6. CARDIAC HISTORY: \"Do you have any history of heart disease?\" (e.g., heart attack, angina, bypass surgery, angioplasty)       *No Answer*  7. LUNG HISTORY: \"Do you have any history of lung disease?\"  (e.g., pulmonary embolus, asthma, emphysema)      Unsure   8. CAUSE: \"What do you think is causing the breathing problem?\"       Unsure  9. OTHER SYMPTOMS: \"Do you have any other symptoms?\" (e.g., chest pain, cough, dizziness, fever, runny nose)      No chest pain,   Unsure of fever  Reports cough, some dizziness,   10. O2 SATURATION MONITOR:  \"Do you use an oxygen saturation monitor (pulse oximeter) at home?\" If Yes, ask: \"What is your reading (oxygen level) today?\" \"What is your usual oxygen saturation reading?\" (e.g., 95%)        No  11. PREGNANCY: \"Is there any chance you are pregnant?\" \"When was your last menstrual period?\"        NA  12. TRAVEL: \"Have you traveled out of the country in the last month?\" (e.g., travel history, exposures)        *No Answer*    Protocols used: Breathing Difficulty-A-[place]    "

## 2024-12-03 NOTE — TELEPHONE ENCOUNTER
"Pt transferred via priority line for shortness of breath. RN attempted to gather information regarding symptoms. Pt speaking quickly and in full sentences, tangential in conversation. RN attempted to redirect pt to triage symptoms, but pt then proceeded to call RN a \"martha bitch\" and that she did not want to talk to this RN. RN reiterated that she was attempting to gather information regarding symptoms, pt then called RN a \"florindaking whore.\" Pt ended call.     Pt not established with [facility] primary care per chart review.     Jayla Nolasco RN  "

## 2024-12-31 ENCOUNTER — NURSE TRIAGE (OUTPATIENT)
Dept: NURSING | Facility: CLINIC | Age: 64
End: 2024-12-31
Payer: MEDICARE

## 2024-12-31 NOTE — TELEPHONE ENCOUNTER
Nurse Triage SBAR    Is this a 2nd Level Triage? NO    Situation: difficulty breathing    Background: patient states that she's in heart failure because  is using bleach to clean, patient states that she hasn't been out of bed for days and is having difficulty breathing.  Patient stated to central scheduling that she was having chest pain    Assessment: patient does sound short of breath during triage    Protocol Recommended Disposition:   Call  Now.   Writer soft transferred patient to 911    Recommendation: Patient verbalized understanding of care advice.       Verónica Strong RN on 12/31/2024 at 2:39 AM      Does the patient meet one of the following criteria for ADS visit consideration? 16+ years old, no PCP (internal or external) but seen at [facility]     TIP  Providers, please consider if this condition is appropriate for management at one of our Acute and Diagnostic Services sites.     If patient is a good candidate, please use dotphrase <dot>triageresponse and select Refer to ADS to document.    Reason for Disposition   SEVERE difficulty breathing (e.g., struggling for each breath, speaks in single words)    Protocols used: Breathing Difficulty-A-AH